# Patient Record
Sex: FEMALE | Race: WHITE | NOT HISPANIC OR LATINO | Employment: OTHER | URBAN - METROPOLITAN AREA
[De-identification: names, ages, dates, MRNs, and addresses within clinical notes are randomized per-mention and may not be internally consistent; named-entity substitution may affect disease eponyms.]

---

## 2017-05-19 ENCOUNTER — OFFICE VISIT (OUTPATIENT)
Dept: URGENT CARE | Facility: MEDICAL CENTER | Age: 32
End: 2017-05-19
Payer: COMMERCIAL

## 2017-05-19 PROCEDURE — 90715 TDAP VACCINE 7 YRS/> IM: CPT

## 2017-05-19 PROCEDURE — 99213 OFFICE O/P EST LOW 20 MIN: CPT

## 2017-05-19 PROCEDURE — S9088 SERVICES PROVIDED IN URGENT: HCPCS

## 2017-07-06 ENCOUNTER — APPOINTMENT (OUTPATIENT)
Dept: LAB | Facility: CLINIC | Age: 32
End: 2017-07-06
Payer: COMMERCIAL

## 2017-07-06 ENCOUNTER — ALLSCRIPTS OFFICE VISIT (OUTPATIENT)
Dept: OTHER | Facility: OTHER | Age: 32
End: 2017-07-06

## 2017-07-06 ENCOUNTER — TRANSCRIBE ORDERS (OUTPATIENT)
Dept: LAB | Facility: CLINIC | Age: 32
End: 2017-07-06

## 2017-07-06 DIAGNOSIS — R53.83 OTHER FATIGUE: ICD-10-CM

## 2017-07-06 DIAGNOSIS — R53.83 FATIGUE DUE TO DEPRESSION: Primary | ICD-10-CM

## 2017-07-06 DIAGNOSIS — F32.A FATIGUE DUE TO DEPRESSION: Primary | ICD-10-CM

## 2017-07-06 LAB
ALBUMIN SERPL BCP-MCNC: 4.2 G/DL (ref 3.5–5)
ALP SERPL-CCNC: 66 U/L (ref 46–116)
ALT SERPL W P-5'-P-CCNC: 19 U/L (ref 12–78)
ANION GAP SERPL CALCULATED.3IONS-SCNC: 5 MMOL/L (ref 4–13)
AST SERPL W P-5'-P-CCNC: 9 U/L (ref 5–45)
BASOPHILS # BLD AUTO: 0.02 THOUSANDS/ΜL (ref 0–0.1)
BASOPHILS NFR BLD AUTO: 0 % (ref 0–1)
BILIRUB SERPL-MCNC: 0.19 MG/DL (ref 0.2–1)
BUN SERPL-MCNC: 12 MG/DL (ref 5–25)
CALCIUM SERPL-MCNC: 9.2 MG/DL (ref 8.3–10.1)
CHLORIDE SERPL-SCNC: 104 MMOL/L (ref 100–108)
CO2 SERPL-SCNC: 26 MMOL/L (ref 21–32)
CREAT SERPL-MCNC: 0.74 MG/DL (ref 0.6–1.3)
EOSINOPHIL # BLD AUTO: 0.72 THOUSAND/ΜL (ref 0–0.61)
EOSINOPHIL NFR BLD AUTO: 8 % (ref 0–6)
ERYTHROCYTE [DISTWIDTH] IN BLOOD BY AUTOMATED COUNT: 13.2 % (ref 11.6–15.1)
FERRITIN SERPL-MCNC: 15 NG/ML (ref 8–388)
GFR SERPL CREATININE-BSD FRML MDRD: >60 ML/MIN/1.73SQ M
GLUCOSE P FAST SERPL-MCNC: 81 MG/DL (ref 65–99)
HCT VFR BLD AUTO: 45.4 % (ref 34.8–46.1)
HGB BLD-MCNC: 15.2 G/DL (ref 11.5–15.4)
IRON SATN MFR SERPL: 10 %
IRON SERPL-MCNC: 41 UG/DL (ref 50–170)
LYMPHOCYTES # BLD AUTO: 2.48 THOUSANDS/ΜL (ref 0.6–4.47)
LYMPHOCYTES NFR BLD AUTO: 28 % (ref 14–44)
MCH RBC QN AUTO: 30.3 PG (ref 26.8–34.3)
MCHC RBC AUTO-ENTMCNC: 33.5 G/DL (ref 31.4–37.4)
MCV RBC AUTO: 90 FL (ref 82–98)
MONOCYTES # BLD AUTO: 0.56 THOUSAND/ΜL (ref 0.17–1.22)
MONOCYTES NFR BLD AUTO: 6 % (ref 4–12)
NEUTROPHILS # BLD AUTO: 5.1 THOUSANDS/ΜL (ref 1.85–7.62)
NEUTS SEG NFR BLD AUTO: 58 % (ref 43–75)
NRBC BLD AUTO-RTO: 0 /100 WBCS
PLATELET # BLD AUTO: 304 THOUSANDS/UL (ref 149–390)
PMV BLD AUTO: 9.7 FL (ref 8.9–12.7)
POTASSIUM SERPL-SCNC: 3.7 MMOL/L (ref 3.5–5.3)
PROT SERPL-MCNC: 8.2 G/DL (ref 6.4–8.2)
RBC # BLD AUTO: 5.02 MILLION/UL (ref 3.81–5.12)
SODIUM SERPL-SCNC: 135 MMOL/L (ref 136–145)
T3 SERPL-MCNC: 0.8 NG/ML (ref 0.6–1.8)
T4 FREE SERPL-MCNC: 0.97 NG/DL (ref 0.76–1.46)
TIBC SERPL-MCNC: 405 UG/DL (ref 250–450)
TSH SERPL DL<=0.05 MIU/L-ACNC: 1.7 UIU/ML (ref 0.36–3.74)
VIT B12 SERPL-MCNC: 411 PG/ML (ref 100–900)
WBC # BLD AUTO: 8.9 THOUSAND/UL (ref 4.31–10.16)

## 2017-07-06 PROCEDURE — 85025 COMPLETE CBC W/AUTO DIFF WBC: CPT

## 2017-07-06 PROCEDURE — 86800 THYROGLOBULIN ANTIBODY: CPT

## 2017-07-06 PROCEDURE — 84443 ASSAY THYROID STIM HORMONE: CPT

## 2017-07-06 PROCEDURE — 86618 LYME DISEASE ANTIBODY: CPT

## 2017-07-06 PROCEDURE — 84439 ASSAY OF FREE THYROXINE: CPT

## 2017-07-06 PROCEDURE — 86038 ANTINUCLEAR ANTIBODIES: CPT

## 2017-07-06 PROCEDURE — 84482 T3 REVERSE: CPT

## 2017-07-06 PROCEDURE — 82728 ASSAY OF FERRITIN: CPT

## 2017-07-06 PROCEDURE — 82652 VIT D 1 25-DIHYDROXY: CPT

## 2017-07-06 PROCEDURE — 86430 RHEUMATOID FACTOR TEST QUAL: CPT

## 2017-07-06 PROCEDURE — 82607 VITAMIN B-12: CPT

## 2017-07-06 PROCEDURE — 36415 COLL VENOUS BLD VENIPUNCTURE: CPT

## 2017-07-06 PROCEDURE — 84480 ASSAY TRIIODOTHYRONINE (T3): CPT

## 2017-07-06 PROCEDURE — 83550 IRON BINDING TEST: CPT

## 2017-07-06 PROCEDURE — 83540 ASSAY OF IRON: CPT

## 2017-07-06 PROCEDURE — 80053 COMPREHEN METABOLIC PANEL: CPT

## 2017-07-06 PROCEDURE — 84432 ASSAY OF THYROGLOBULIN: CPT

## 2017-07-06 PROCEDURE — 86376 MICROSOMAL ANTIBODY EACH: CPT

## 2017-07-07 LAB
RHEUMATOID FACT SER QL LA: NEGATIVE
RYE IGE QN: NEGATIVE

## 2017-07-08 LAB
THYROGLOB AB SERPL-ACNC: <1 IU/ML (ref 0–0.9)
THYROGLOB SERPL-MCNC: 4.9 NG/ML (ref 1.5–38.5)
THYROPEROXIDASE AB SERPL-ACNC: 22 IU/ML (ref 0–34)

## 2017-07-09 LAB
B BURGDOR IGG SER IA-ACNC: 0.13
B BURGDOR IGM SER IA-ACNC: 0.24

## 2017-07-11 ENCOUNTER — GENERIC CONVERSION - ENCOUNTER (OUTPATIENT)
Dept: OTHER | Facility: OTHER | Age: 32
End: 2017-07-11

## 2017-07-11 LAB
1,25(OH)2D3 SERPL-MCNC: 30.1 PG/ML (ref 19.9–79.3)
T3REVERSE SERPL-MCNC: 15.4 NG/DL (ref 9.2–24.1)

## 2017-10-01 ENCOUNTER — OFFICE VISIT (OUTPATIENT)
Dept: URGENT CARE | Facility: MEDICAL CENTER | Age: 32
End: 2017-10-01
Payer: COMMERCIAL

## 2017-10-01 PROCEDURE — 99213 OFFICE O/P EST LOW 20 MIN: CPT

## 2017-10-01 PROCEDURE — S9088 SERVICES PROVIDED IN URGENT: HCPCS

## 2017-10-03 NOTE — PROGRESS NOTES
Assessment  1  Acute sinusitis (461 9) (J01 90)    Plan  Acute sinusitis    · Amoxicillin 500 MG Oral Capsule; TAKE 1 CAPSULE 3 TIMES DAILY UNTIL GONE   · Bromfed DM 30-2-10 MG/5ML Oral Syrup; TAKE 5 ML 4 times daily    tylenol for pain or fever  Chief Complaint  1  Cold Symptoms  Chief Complaint Free Text Note Form: Patient presents with cough and cold symptoms times one week, Now cough productive  History of Present Illness  HPI: 33 y/o F c/o 2 weeks of cough, congestion, sinus pressure  no meds otc for this  no fever  tolerating po  Hospital Based Practices Required Assessment:   Pain Assessment   the patient states they do not have pain  Active Problems  1  Antiphospholipid syndrome (289 81) (D68 61)   2  Fatigue (780 79) (R53 83)   3  PCOS (polycystic ovarian syndrome) (256 4) (E28 2)   4  Polycystic ovaries (256 4) (E28 2)   5  Puncture wound of right foot, initial encounter (892 0) (V11 956C)    Past Medical History  1  History of chicken pox (V12 09) (Z86 19)   2  History of multiple gestation (V49 89) (Z87 59)   3  History of pneumonia (V12 61) (Z87 01)   4  History of rupture of uterus (V13 29) (Z87 59)    Social History   · Alcohol use (V49 89) (Z78 9)   · Always uses seat belt   · Caffeine use (V49 89) (F15 90)   · Never a smoker   · Second hand tobacco smoke exposure (V15 89) (Z77 22)    Surgical History  1  History of Hysteroscopy   2  History of Knee Surgery    Current Meds   1  Jencycla 0 35 MG Oral Tablet; TAKE 1 TABLET DAILY; Therapy: (Recorded:[id number]) to Recorded  Medication List Reviewed: The medication list was reviewed and updated today  Allergies  1  Naproxen Sodium TABS   2  Valtrex  3  Animal dander - Cats   4   Animal dander - Dogs    Vitals  Signs   Recorded: [id number] 01:07PM   Temperature: 98 7 F, Oral  Heart Rate: 84  Respiration: 18  Systolic: 174  Diastolic: 70  O2 Saturation: 98  Pain Scale: 0    Physical Exam    Constitutional   General appearance: No acute distress, well appearing and well nourished  Eyes   Conjunctiva and lids: No swelling, erythema or discharge  Pupils and irises: Equal, round and reactive to light  Ears, Nose, Mouth, and Throat   External inspection of ears and nose: Abnormal  -TTP BL max sinuses  Otoscopic examination: Tympanic membranes translucent with normal light reflex  Canals patent without erythema  Nasal mucosa, septum, and turbinates: Normal without edema or erythema  Oropharynx: Abnormal  -PND  Pulmonary   Respiratory effort: No increased work of breathing or signs of respiratory distress  Auscultation of lungs: Clear to auscultation  Cardiovascular   Auscultation of heart: Normal rate and rhythm, normal S1 and S2, without murmurs  Lymphatic   Palpation of lymph nodes in neck: No lymphadenopathy         Signatures   Electronically signed by : Harrison Hollis, [facility]; Oct  1 2017  1:34PM EST                       (Author)    Electronically signed by : ELSI Burleson ; Oct  2 2017 11:05AM EST                       (Co-author)

## 2017-10-13 ENCOUNTER — HOSPITAL ENCOUNTER (EMERGENCY)
Facility: HOSPITAL | Age: 32
Discharge: HOME | End: 2017-10-13
Attending: EMERGENCY MEDICINE | Admitting: EMERGENCY MEDICINE
Payer: COMMERCIAL

## 2017-10-13 VITALS
HEIGHT: 60 IN | RESPIRATION RATE: 24 BRPM | OXYGEN SATURATION: 96 % | WEIGHT: 195.99 LBS | TEMPERATURE: 98.6 F | SYSTOLIC BLOOD PRESSURE: 124 MMHG | HEART RATE: 73 BPM | DIASTOLIC BLOOD PRESSURE: 80 MMHG | BODY MASS INDEX: 38.48 KG/M2

## 2017-10-13 DIAGNOSIS — J40 BRONCHITIS: Primary | ICD-10-CM

## 2017-10-13 LAB
ALBUMIN SERPL BCP-MCNC: 3.5 G/DL (ref 3.5–5)
ALP SERPL-CCNC: 65 U/L (ref 46–116)
ALT SERPL W P-5'-P-CCNC: 43 U/L (ref 12–78)
ANION GAP SERPL CALCULATED.3IONS-SCNC: 11 MMOL/L (ref 4–13)
AST SERPL W P-5'-P-CCNC: 13 U/L (ref 5–45)
BASOPHILS # BLD AUTO: 0.01 THOUSANDS/ΜL (ref 0–0.1)
BASOPHILS NFR BLD AUTO: 0 % (ref 0–1)
BILIRUB DIRECT SERPL-MCNC: 0.06 MG/DL (ref 0–0.2)
BILIRUB SERPL-MCNC: 0.2 MG/DL (ref 0.2–1)
BUN SERPL-MCNC: 5 MG/DL (ref 5–25)
CALCIUM SERPL-MCNC: 8.5 MG/DL (ref 8.3–10.1)
CHLORIDE SERPL-SCNC: 105 MMOL/L (ref 100–108)
CO2 SERPL-SCNC: 27 MMOL/L (ref 21–32)
CREAT SERPL-MCNC: 0.83 MG/DL (ref 0.6–1.3)
EOSINOPHIL # BLD AUTO: 0.01 THOUSAND/ΜL (ref 0–0.61)
EOSINOPHIL NFR BLD AUTO: 0 % (ref 0–6)
ERYTHROCYTE [DISTWIDTH] IN BLOOD BY AUTOMATED COUNT: 12.2 % (ref 11.6–15.1)
GFR SERPL CREATININE-BSD FRML MDRD: 93 ML/MIN/1.73SQ M
GLUCOSE SERPL-MCNC: 103 MG/DL (ref 65–140)
HCG SERPL QL: NEGATIVE
HCT VFR BLD AUTO: 38.7 % (ref 34.8–46.1)
HGB BLD-MCNC: 13.2 G/DL (ref 11.5–15.4)
LYMPHOCYTES # BLD AUTO: 1.17 THOUSANDS/ΜL (ref 0.6–4.47)
LYMPHOCYTES NFR BLD AUTO: 13 % (ref 14–44)
MCH RBC QN AUTO: 30.1 PG (ref 26.8–34.3)
MCHC RBC AUTO-ENTMCNC: 34.1 G/DL (ref 31.4–37.4)
MCV RBC AUTO: 88 FL (ref 82–98)
MONOCYTES # BLD AUTO: 0.42 THOUSAND/ΜL (ref 0.17–1.22)
MONOCYTES NFR BLD AUTO: 5 % (ref 4–12)
NEUTROPHILS # BLD AUTO: 7.35 THOUSANDS/ΜL (ref 1.85–7.62)
NEUTS SEG NFR BLD AUTO: 82 % (ref 43–75)
NRBC BLD AUTO-RTO: 0 /100 WBCS
PLATELET # BLD AUTO: 302 THOUSANDS/UL (ref 149–390)
PMV BLD AUTO: 8.7 FL (ref 8.9–12.7)
POTASSIUM SERPL-SCNC: 3.6 MMOL/L (ref 3.5–5.3)
PROT SERPL-MCNC: 7.6 G/DL (ref 6.4–8.2)
RBC # BLD AUTO: 4.39 MILLION/UL (ref 3.81–5.12)
SODIUM SERPL-SCNC: 143 MMOL/L (ref 136–145)
WBC # BLD AUTO: 9 THOUSAND/UL (ref 4.31–10.16)

## 2017-10-13 PROCEDURE — 99285 EMERGENCY DEPT VISIT HI MDM: CPT

## 2017-10-13 PROCEDURE — 85025 COMPLETE CBC W/AUTO DIFF WBC: CPT | Performed by: EMERGENCY MEDICINE

## 2017-10-13 PROCEDURE — 96361 HYDRATE IV INFUSION ADD-ON: CPT

## 2017-10-13 PROCEDURE — 36415 COLL VENOUS BLD VENIPUNCTURE: CPT | Performed by: EMERGENCY MEDICINE

## 2017-10-13 PROCEDURE — 96374 THER/PROPH/DIAG INJ IV PUSH: CPT

## 2017-10-13 PROCEDURE — 80048 BASIC METABOLIC PNL TOTAL CA: CPT | Performed by: EMERGENCY MEDICINE

## 2017-10-13 PROCEDURE — 84703 CHORIONIC GONADOTROPIN ASSAY: CPT | Performed by: EMERGENCY MEDICINE

## 2017-10-13 PROCEDURE — 80076 HEPATIC FUNCTION PANEL: CPT | Performed by: EMERGENCY MEDICINE

## 2017-10-13 RX ORDER — PREDNISONE 20 MG/1
40 TABLET ORAL DAILY
Qty: 10 TABLET | Refills: 0 | Status: SHIPPED | OUTPATIENT
Start: 2017-10-13 | End: 2017-10-18

## 2017-10-13 RX ORDER — METHYLPREDNISOLONE SODIUM SUCCINATE 125 MG/2ML
125 INJECTION, POWDER, LYOPHILIZED, FOR SOLUTION INTRAMUSCULAR; INTRAVENOUS ONCE
Status: COMPLETED | OUTPATIENT
Start: 2017-10-13 | End: 2017-10-13

## 2017-10-13 RX ORDER — ALBUTEROL SULFATE 2.5 MG/3ML
5 SOLUTION RESPIRATORY (INHALATION) ONCE
Status: COMPLETED | OUTPATIENT
Start: 2017-10-13 | End: 2017-10-13

## 2017-10-13 RX ORDER — ALBUTEROL SULFATE 2.5 MG/3ML
2.5 SOLUTION RESPIRATORY (INHALATION) EVERY 6 HOURS PRN
Qty: 75 ML | Refills: 0 | Status: SHIPPED | OUTPATIENT
Start: 2017-10-13 | End: 2019-03-20

## 2017-10-13 RX ADMIN — METHYLPREDNISOLONE SODIUM SUCCINATE 125 MG: 125 INJECTION, POWDER, FOR SOLUTION INTRAMUSCULAR; INTRAVENOUS at 19:21

## 2017-10-13 RX ADMIN — SODIUM CHLORIDE 1000 ML: 0.9 INJECTION, SOLUTION INTRAVENOUS at 19:17

## 2017-10-13 RX ADMIN — HYDROCODONE BITARTRATE AND HOMATROPINE METHYLBROMIDE 5 ML: 5; 1.5 SOLUTION ORAL at 19:24

## 2017-10-13 RX ADMIN — ALBUTEROL SULFATE 5 MG: 2.5 SOLUTION RESPIRATORY (INHALATION) at 19:24

## 2017-10-13 RX ADMIN — IPRATROPIUM BROMIDE 0.5 MG: 0.5 SOLUTION RESPIRATORY (INHALATION) at 19:24

## 2017-10-13 NOTE — ED PROVIDER NOTES
History  Chief Complaint   Patient presents with    Shortness of Breath     pt stated to have had cough for the passed three weeks that has been constant  pt is stating to "lose voice" and have a sore throat  Pt stated to have been treated with antibiotics/cough medicine and has had no benefit  HPI patient is a 50-year-old female, history of cough for the last 3 weeks treated with antibiotics, patient reports using albuterol at home but still has a hacking cough  Patient request cough suppressant and she reports that she cannot sleep  Patient reports not sleeping more than 2 hours over the last few days  Patient reports she believe she is adequately treated with antibiotics but reports that she has recurrent bronchitis and feels that this is a bronchitis but reports that she cannot stop coughing  Patient reports was treated with prednisone for 3 days but it improved but not resolved and it recurred  She denies any fever or chills  She denies any sputum production  Past medical history of chronic bronchitis  Family history noncontributory  Social history nonsmoker no history of drug abuse  Prior to Admission Medications   Prescriptions Last Dose Informant Patient Reported? Taking? 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[address] )        Past Surgical History:   Procedure Laterality Date     SECTION         History reviewed  No pertinent family history  I have reviewed and agree with the history as documented  Social History   Substance Use Topics    Smoking status: Passive Smoke Exposure - Never Smoker    Smokeless tobacco: Never Used    Alcohol use Yes      Comment: occasional        Review of Systems   Constitutional: Negative for diaphoresis, fatigue and fever  HENT: Negative for congestion, ear pain, nosebleeds and sore throat  Eyes: Negative for photophobia, pain, discharge and visual disturbance  Respiratory: Positive for cough  Negative for choking, chest tightness, shortness of breath and wheezing  Cardiovascular: Negative for chest pain and palpitations  Gastrointestinal: Negative for abdominal distention, abdominal pain, diarrhea and vomiting  Genitourinary: Negative for dysuria, flank pain and frequency  Musculoskeletal: Negative for back pain, gait problem and joint swelling  Skin: Negative for color change and rash  Neurological: Negative for dizziness, syncope and headaches  Psychiatric/Behavioral: Negative for behavioral problems and confusion  The patient is not nervous/anxious  All other systems reviewed and are negative  Physical Exam  ED Triage Vitals [10/13/17 1854]   Temperature Pulse Respirations Blood Pressure SpO2   98 6 °F (37 °C) 73 (!) 24 124/80 96 %      Temp Source Heart Rate Source Patient Position - Orthostatic VS BP Location FiO2 (%)   Oral -- Sitting Right arm --      Pain Score       --           Physical Exam   Constitutional: She is oriented to person, place, and time  She appears well-developed and well-nourished  HENT:   Head: Normocephalic  Right Ear: External ear normal    Left Ear: External ear normal    Nose: Nose normal    Mouth/Throat: Oropharynx is clear and moist    Eyes: EOM and lids are normal  Pupils are equal, round, and reactive to light  Neck: Normal range of motion  Neck supple  Cardiovascular: Normal rate, regular rhythm, normal heart sounds and intact distal pulses  Pulmonary/Chest: Effort normal and breath sounds normal  No respiratory distress  The patient has cough with deep inspiration   Abdominal: Soft  Bowel sounds are normal  She exhibits no distension and no mass  There is no tenderness  There is no rebound and no guarding  No hernia  Musculoskeletal: Normal range of motion  She exhibits no deformity     Neurological: She is alert and oriented to person, place, and time  Skin: Skin is warm and dry  Psychiatric: She has a normal mood and affect  Nursing note and vitals reviewed     Pulse oximetry 96% on room air adequate oxygenation, hypoxia    ED Medications  Medications   sodium chloride 0 9 % bolus 1,000 mL (0 mL Intravenous Stopped 10/13/17 2037)   methylPREDNISolone sodium succinate (Solu-MEDROL) injection 125 mg (125 mg Intravenous Given 10/13/17 1921)   albuterol inhalation solution 5 mg (5 mg Nebulization Given 10/13/17 1924)   ipratropium (ATROVENT) 0 02 % inhalation solution 0 5 mg (0 5 mg Nebulization Given 10/13/17 1924)   HYDROcodone-homatropine (HYCODAN) 5-1 5 mg/5 mL oral syrup 5 mL (5 mL Oral Given 10/13/17 1924)       Diagnostic Studies  Labs Reviewed   CBC AND DIFFERENTIAL - Abnormal        Result Value Ref Range Status    MPV 8 7 (*) 8 9 - 12 7 fL Final    Neutrophils Relative 82 (*) 43 - 75 % Final    Lymphocytes Relative 13 (*) 14 - 44 % Final    WBC 9 00  4 31 - 10 16 Thousand/uL Final    RBC 4 39  3 81 - 5 12 Million/uL Final    Hemoglobin 13 2  11 5 - 15 4 g/dL Final    Hematocrit 38 7  34 8 - 46 1 % Final    MCV 88  82 - 98 fL Final    MCH 30 1  26 8 - 34 3 pg Final    MCHC 34 1  31 4 - 37 4 g/dL Final    RDW 12 2  11 6 - 15 1 % Final    Platelets 532  777 - 390 Thousands/uL Final    nRBC 0  /100 WBCs Final    Monocytes Relative 5  4 - 12 % Final    Eosinophils Relative 0  0 - 6 % Final    Basophils Relative 0  0 - 1 % Final    Neutrophils Absolute 7 35  1 85 - 7 62 Thousands/µL Final    Lymphocytes Absolute 1 17  0 60 - 4 47 Thousands/µL Final    Monocytes Absolute 0 42  0 17 - 1 22 Thousand/µL Final    Eosinophils Absolute 0 01  0 00 - 0 61 Thousand/µL Final    Basophils Absolute 0 01  0 00 - 0 10 Thousands/µL Final   HEPATIC FUNCTION PANEL - Normal    Total Bilirubin 0 20  0 20 - 1 00 mg/dL Final    Bilirubin, Direct 0 06  0 00 - 0 20 mg/dL Final    Alkaline Phosphatase 65  46 - 116 U/L Final    AST 13  5 - 45 U/L Final    Comment:   Specimen collection should occur prior to Sulfasalazine administration due to the potential for falsely depressed results  ALT 43  12 - 78 U/L Final    Comment:   Specimen collection should occur prior to Sulfasalazine administration due to the potential for falsely depressed results  Total Protein 7 6  6 4 - 8 2 g/dL Final    Albumin 3 5  3 5 - 5 0 g/dL Final   PREGNANCY TEST (HCG QUALITATIVE) - Normal    Preg, Serum Negative  Negative Final   BASIC METABOLIC PANEL    Sodium 399  136 - 145 mmol/L Final    Potassium 3 6  3 5 - 5 3 mmol/L Final    Chloride 105  100 - 108 mmol/L Final    CO2 27  21 - 32 mmol/L Final    Anion Gap 11  4 - 13 mmol/L Final    BUN 5  5 - 25 mg/dL Final    Creatinine 0 83  0 60 - 1 30 mg/dL Final    Comment: Standardized to IDMS reference method    Glucose 103  65 - 140 mg/dL Final    Comment:   If the patient is fasting, the ADA then defines impaired fasting glucose as > 100 mg/dL and diabetes as > or equal to 123 mg/dL  Specimen collection should occur prior to Sulfasalazine administration due to the potential for falsely depressed results  Specimen collection should occur prior to Sulfapyridine administration due to the potential for falsely elevated results  Calcium 8 5  8 3 - 10 1 mg/dL Final    eGFR 93  ml/min/1 73sq m Final    Narrative:     National Kidney Disease Education Program recommendations are as follows:  GFR calculation is accurate only with a steady state creatinine  Chronic Kidney disease less than 60 ml/min/1 73 sq  meters  Kidney failure less than 15 ml/min/1 73 sq  meters  No orders to display       Procedures  Procedures      Phone Contacts  ED Phone Contact    ED Course  ED Course         diagnostic testing shows a white count 9000, which is normal no sign of inflammation, hemoglobin was normal at 13 point do not inside of anemia  Patient had normal liver functions     patient's electrolytes were normal  Discussed with patient did not want a chest x-ray had a chest x-ray this week that her doctor told her was normal   Patient did not want to be re-treated with antibiotics was treated for antibiotics 10 days and believes that sent off  Patient most concerned about her cough  Patient was markedly improved after albuterol prednisone and Hycodan                      [facility] medical decision making 18-year-old female with hacking cough, history of asthma, treated with bronchodilators and steroids, requesting primarily cough medicine here, treated with Hycodan with relief, we discussed treatment and follow-up, we discussed indications to return  CritCare Time    Disposition  Final diagnoses:   Bronchitis     ED Disposition     ED Disposition Condition Comment    Discharge  Yong Milian discharge to home/self care  Condition at discharge: Good        Follow-up Information    None       Discharge Medication List as of 10/13/2017  8:21 PM      START taking these medications    Details   albuterol (2 5 mg/3 mL) 0 083 % nebulizer solution Take 3 mL by nebulization every 6 (six) hours as needed for wheezing, Starting Fri 10/13/2017, Print      HYDROcodone-homatropine (HYCODAN) 5-1 5 mg/5 mL syrup Take 5 mL by mouth 4 (four) times a day as needed for cough for up to 10 days Max Daily Amount: 20 mL, Starting Fri 10/13/2017, Until Mon 10/23/2017, Print      predniSONE 20 mg tablet Take 2 tablets by mouth daily for 5 days, Starting Fri 10/13/2017, Until Wed 10/18/2017, Print         CONTINUE these medications which have NOT CHANGED    Details   Norethindrone (JENCYCLA PO) Take by mouth, Historical Med           No discharge procedures on file      ED Provider  Electronically Signed by       Constantin Silva MD  10/13/17 6470

## 2017-10-14 NOTE — DISCHARGE INSTRUCTIONS
Albuterol every 6 hours as needed for bronchospasm  Prednisone 2 tabs daily for the next 5 days to reduce inflammation  Hycodan every 4-6 hours if needed for cough, caution will make a sleepy narcotic  Follow up with your doctor     Acute Bronchitis   WHAT YOU NEED TO KNOW:   Acute bronchitis is swelling and irritation in the air passages of your lungs  This irritation may cause you to cough or have other breathing problems  Acute bronchitis often starts because of another illness, such as a cold or the flu  The illness spreads from your nose and throat to your windpipe and airways  Bronchitis is often called a chest cold  Acute bronchitis lasts about 3 to 6 weeks and is usually not a serious illness  Your cough can last for several weeks  DISCHARGE INSTRUCTIONS:   Return to the emergency department if:   · You cough up blood  · Your lips or fingernails turn blue  · You feel like you are not getting enough air when you breathe  Contact your healthcare provider if:   · You have a fever  · Your breathing problems do not go away or get worse  · Your cough does not get better within 4 weeks  · You have questions or concerns about your condition or care  Self-care:   · Get more rest   Rest helps your body to heal  Slowly start to do more each day  Rest when you feel it is needed  · Avoid irritants in the air  Avoid chemicals, fumes, and dust  Wear a face mask if you must work around dust or fumes  Stay inside on days when air pollution levels are high  If you have allergies, stay inside when pollen counts are high  Do not use aerosol products, such as spray-on deodorant, bug spray, and hair spray  · Do not smoke or be around others who smoke  Nicotine and other chemicals in cigarettes and cigars damages the cilia that move mucus out of your lungs  Ask your healthcare provider for information if you currently smoke and need help to quit  E-cigarettes or smokeless tobacco still contain nicotine  Talk to your healthcare provider before you use these products  · Drink liquids as directed  Liquids help keep your air passages moist and help you cough up mucus  You may need to drink more liquids when you have acute bronchitis  Ask how much liquid to drink each day and which liquids are best for you  · Use a humidifier or vaporizer  Use a cool mist humidifier or a vaporizer to increase air moisture in your home  This may make it easier for you to breathe and help decrease your cough  Decrease risk for acute bronchitis:   · Get the vaccinations you need  Ask your healthcare provider if you should get vaccinated against the flu or pneumonia  · Prevent the spread of germs  You can decrease your risk of acute bronchitis and other illnesses by doing the following:     [facility] [place] hands often with soap and water  Carry germ-killing hand lotion or gel with you  You can use the lotion or gel to clean your hands when soap and water are not available  ¨ Do not touch your eyes, nose, or mouth unless you have washed your hands first     ¨ Always cover your mouth when you cough to prevent the spread of germs  It is best to cough into a tissue or your shirt sleeve instead of into your hand  Ask those around you cover their mouths when they cough  ¨ Try to avoid people who have a cold or the flu  If you are sick, stay away from others as much as possible  Medicines: Your healthcare provider may  give you any of the following:  · Ibuprofen or acetaminophen  are medicines that help lower your fever  They are available without a doctor's order  Ask your healthcare provider which medicine is right for you  Ask how much to take and how often to take it  Follow directions  These medicines can cause stomach bleeding if not taken correctly  Ibuprofen can cause kidney damage  Do not take ibuprofen if you have kidney disease, an ulcer, or allergies to aspirin  Acetaminophen can cause liver damage   Do not take more than 4,000 milligrams in 24 hours  · Decongestants  help loosen mucus in your lungs and make it easier to cough up  This can help you breathe easier  · Cough suppressants  decrease your urge to cough  If your cough produces mucus, do not take a cough suppressant unless your healthcare provider tells you to  Your healthcare provider may suggest that you take a cough suppressant at night so you can rest     · Inhalers  may be given  Your healthcare provider may give you one or more inhalers to help you breathe easier and cough less  An inhaler gives your medicine to open your airways  Ask your healthcare provider to show you how to use your inhaler correctly  · Take your medicine as directed  Contact your healthcare provider if you think your medicine is not helping or if you have side effects  Tell him of her if you are allergic to any medicine  Keep a list of the medicines, vitamins, and herbs you take  Include the amounts, and when and why you take them  Bring the list or the pill bottles to follow-up visits  Carry your medicine list with you in case of an emergency  Follow up with your healthcare provider as directed:  Write down questions you have so you will remember to ask them during your follow-up visits  © 2017 [employer] [employer]  Information is for End User's use only and may not be sold, redistributed or otherwise used for commercial purposes  All illustrations and images included in CareNotes® are the copyrighted property of [employer]  or [address]  The above information is an  only  It is not intended as medical advice for individual conditions or treatments  Talk to your doctor, nurse or pharmacist before following any medical regimen to see if it is safe and effective for you

## 2017-10-16 ENCOUNTER — APPOINTMENT (EMERGENCY)
Dept: CT IMAGING | Facility: HOSPITAL | Age: 32
End: 2017-10-16
Payer: COMMERCIAL

## 2017-10-16 ENCOUNTER — HOSPITAL ENCOUNTER (EMERGENCY)
Facility: HOSPITAL | Age: 32
Discharge: HOME | End: 2017-10-16
Admitting: EMERGENCY MEDICINE
Payer: COMMERCIAL

## 2017-10-16 VITALS
RESPIRATION RATE: 20 BRPM | BODY MASS INDEX: 38.49 KG/M2 | OXYGEN SATURATION: 94 % | DIASTOLIC BLOOD PRESSURE: 78 MMHG | SYSTOLIC BLOOD PRESSURE: 128 MMHG | TEMPERATURE: 98.6 F | HEART RATE: 91 BPM | WEIGHT: 197.09 LBS

## 2017-10-16 DIAGNOSIS — J20.9 ACUTE BRONCHITIS, UNSPECIFIED ORGANISM: ICD-10-CM

## 2017-10-16 DIAGNOSIS — J18.9 COMMUNITY ACQUIRED PNEUMONIA, UNSPECIFIED LATERALITY: Primary | ICD-10-CM

## 2017-10-16 LAB
ALBUMIN SERPL BCP-MCNC: 3.5 G/DL (ref 3.5–5)
ALP SERPL-CCNC: 61 U/L (ref 46–116)
ALT SERPL W P-5'-P-CCNC: 31 U/L (ref 12–78)
ANION GAP SERPL CALCULATED.3IONS-SCNC: 10 MMOL/L (ref 4–13)
AST SERPL W P-5'-P-CCNC: 27 U/L (ref 5–45)
BASOPHILS # BLD AUTO: 0.01 THOUSANDS/ΜL (ref 0–0.1)
BASOPHILS NFR BLD AUTO: 0 % (ref 0–1)
BILIRUB SERPL-MCNC: 0.4 MG/DL (ref 0.2–1)
BUN SERPL-MCNC: 8 MG/DL (ref 5–25)
CALCIUM SERPL-MCNC: 9.1 MG/DL (ref 8.3–10.1)
CHLORIDE SERPL-SCNC: 102 MMOL/L (ref 100–108)
CO2 SERPL-SCNC: 28 MMOL/L (ref 21–32)
CREAT SERPL-MCNC: 0.86 MG/DL (ref 0.6–1.3)
EOSINOPHIL # BLD AUTO: 0.12 THOUSAND/ΜL (ref 0–0.61)
EOSINOPHIL NFR BLD AUTO: 1 % (ref 0–6)
ERYTHROCYTE [DISTWIDTH] IN BLOOD BY AUTOMATED COUNT: 12.6 % (ref 11.6–15.1)
GFR SERPL CREATININE-BSD FRML MDRD: 89 ML/MIN/1.73SQ M
GLUCOSE SERPL-MCNC: 93 MG/DL (ref 65–140)
HCT VFR BLD AUTO: 37.8 % (ref 34.8–46.1)
HGB BLD-MCNC: 12.7 G/DL (ref 11.5–15.4)
HOLD SPECIMEN: NORMAL
LACTATE SERPL-SCNC: 1.5 MMOL/L (ref 0.5–2)
LYMPHOCYTES # BLD AUTO: 1.47 THOUSANDS/ΜL (ref 0.6–4.47)
LYMPHOCYTES NFR BLD AUTO: 17 % (ref 14–44)
MCH RBC QN AUTO: 29.6 PG (ref 26.8–34.3)
MCHC RBC AUTO-ENTMCNC: 33.6 G/DL (ref 31.4–37.4)
MCV RBC AUTO: 88 FL (ref 82–98)
MONOCYTES # BLD AUTO: 0.5 THOUSAND/ΜL (ref 0.17–1.22)
MONOCYTES NFR BLD AUTO: 6 % (ref 4–12)
NEUTROPHILS # BLD AUTO: 6.35 THOUSANDS/ΜL (ref 1.85–7.62)
NEUTS SEG NFR BLD AUTO: 75 % (ref 43–75)
NRBC BLD AUTO-RTO: 0 /100 WBCS
PLATELET # BLD AUTO: 177 THOUSANDS/UL (ref 149–390)
PMV BLD AUTO: 10.6 FL (ref 8.9–12.7)
POTASSIUM SERPL-SCNC: 3.3 MMOL/L (ref 3.5–5.3)
PROT SERPL-MCNC: 7.6 G/DL (ref 6.4–8.2)
RBC # BLD AUTO: 4.29 MILLION/UL (ref 3.81–5.12)
SODIUM SERPL-SCNC: 140 MMOL/L (ref 136–145)
WBC # BLD AUTO: 8.49 THOUSAND/UL (ref 4.31–10.16)

## 2017-10-16 PROCEDURE — 71275 CT ANGIOGRAPHY CHEST: CPT

## 2017-10-16 PROCEDURE — 36415 COLL VENOUS BLD VENIPUNCTURE: CPT | Performed by: NURSE PRACTITIONER

## 2017-10-16 PROCEDURE — 94640 AIRWAY INHALATION TREATMENT: CPT

## 2017-10-16 PROCEDURE — 83605 ASSAY OF LACTIC ACID: CPT | Performed by: NURSE PRACTITIONER

## 2017-10-16 PROCEDURE — 96375 TX/PRO/DX INJ NEW DRUG ADDON: CPT

## 2017-10-16 PROCEDURE — 96361 HYDRATE IV INFUSION ADD-ON: CPT

## 2017-10-16 PROCEDURE — 85025 COMPLETE CBC W/AUTO DIFF WBC: CPT | Performed by: NURSE PRACTITIONER

## 2017-10-16 PROCEDURE — 96374 THER/PROPH/DIAG INJ IV PUSH: CPT

## 2017-10-16 PROCEDURE — 80053 COMPREHEN METABOLIC PANEL: CPT | Performed by: NURSE PRACTITIONER

## 2017-10-16 PROCEDURE — 99285 EMERGENCY DEPT VISIT HI MDM: CPT

## 2017-10-16 RX ORDER — IPRATROPIUM BROMIDE AND ALBUTEROL SULFATE 2.5; .5 MG/3ML; MG/3ML
SOLUTION RESPIRATORY (INHALATION)
Status: COMPLETED
Start: 2017-10-16 | End: 2017-10-16

## 2017-10-16 RX ORDER — LORAZEPAM 2 MG/ML
1 INJECTION INTRAMUSCULAR ONCE
Status: COMPLETED | OUTPATIENT
Start: 2017-10-16 | End: 2017-10-16

## 2017-10-16 RX ORDER — GUAIFENESIN 600 MG
600 TABLET, EXTENDED RELEASE 12 HR ORAL EVERY 12 HOURS SCHEDULED
Qty: 10 TABLET | Refills: 0 | Status: SHIPPED | OUTPATIENT
Start: 2017-10-16 | End: 2017-10-21

## 2017-10-16 RX ORDER — DOXYCYCLINE HYCLATE 100 MG/1
100 CAPSULE ORAL 2 TIMES DAILY
Qty: 20 CAPSULE | Refills: 0 | Status: SHIPPED | OUTPATIENT
Start: 2017-10-16 | End: 2017-10-26

## 2017-10-16 RX ORDER — ALBUTEROL SULFATE 2.5 MG/3ML
2.5 SOLUTION RESPIRATORY (INHALATION) EVERY 6 HOURS PRN
Qty: 75 ML | Refills: 0 | Status: SHIPPED | OUTPATIENT
Start: 2017-10-16 | End: 2017-10-23

## 2017-10-16 RX ORDER — DOXYCYCLINE HYCLATE 100 MG/1
100 CAPSULE ORAL ONCE
Status: COMPLETED | OUTPATIENT
Start: 2017-10-16 | End: 2017-10-16

## 2017-10-16 RX ORDER — METHYLPREDNISOLONE SODIUM SUCCINATE 125 MG/2ML
125 INJECTION, POWDER, LYOPHILIZED, FOR SOLUTION INTRAMUSCULAR; INTRAVENOUS ONCE
Status: COMPLETED | OUTPATIENT
Start: 2017-10-16 | End: 2017-10-16

## 2017-10-16 RX ORDER — ALBUTEROL SULFATE 2.5 MG/3ML
5 SOLUTION RESPIRATORY (INHALATION) ONCE
Status: COMPLETED | OUTPATIENT
Start: 2017-10-16 | End: 2017-10-16

## 2017-10-16 RX ORDER — PREDNISONE 20 MG/1
60 TABLET ORAL DAILY
Qty: 15 TABLET | Refills: 0 | Status: SHIPPED | OUTPATIENT
Start: 2017-10-16 | End: 2017-10-21

## 2017-10-16 RX ORDER — SODIUM CHLORIDE FOR INHALATION 0.9 %
VIAL, NEBULIZER (ML) INHALATION
Status: COMPLETED
Start: 2017-10-16 | End: 2017-10-16

## 2017-10-16 RX ADMIN — IPRATROPIUM BROMIDE 0.5 MG: 0.5 SOLUTION RESPIRATORY (INHALATION) at 14:42

## 2017-10-16 RX ADMIN — ISODIUM CHLORIDE: 0.03 SOLUTION RESPIRATORY (INHALATION) at 13:21

## 2017-10-16 RX ADMIN — DOXYCYCLINE HYCLATE 100 MG: 100 CAPSULE, GELATIN COATED ORAL at 14:42

## 2017-10-16 RX ADMIN — LORAZEPAM: 2 INJECTION INTRAMUSCULAR; INTRAVENOUS at 13:16

## 2017-10-16 RX ADMIN — METHYLPREDNISOLONE SODIUM SUCCINATE 125 MG: 125 INJECTION, POWDER, FOR SOLUTION INTRAMUSCULAR; INTRAVENOUS at 13:14

## 2017-10-16 RX ADMIN — ALBUTEROL SULFATE 2.5 MG: 2.5 SOLUTION RESPIRATORY (INHALATION) at 14:41

## 2017-10-16 RX ADMIN — IOHEXOL 85 ML: 350 INJECTION, SOLUTION INTRAVENOUS at 13:08

## 2017-10-16 RX ADMIN — SODIUM CHLORIDE 1000 ML: 0.9 INJECTION, SOLUTION INTRAVENOUS at 12:57

## 2017-10-16 RX ADMIN — IPRATROPIUM BROMIDE AND ALBUTEROL SULFATE 3 ML: .5; 3 SOLUTION RESPIRATORY (INHALATION) at 13:21

## 2017-10-16 NOTE — DISCHARGE INSTRUCTIONS
Acute Bronchitis   WHAT YOU SHOULD KNOW:   Acute bronchitis is swelling and irritation in the air passages of your lungs  This irritation may cause you to cough or have other breathing problems  Acute bronchitis often starts because of another viral illness, such as a cold or the flu  The illness spreads from your nose and throat to your windpipe and airways  Bronchitis is often called a chest cold  Acute bronchitis lasts about 2 weeks and is usually not a serious illness  AFTER YOU LEAVE:   Medicines:   · Ibuprofen or acetaminophen:  These medicines help lower a fever  They are available without a doctor's order  Ask your healthcare provider which medicine is right for you  Ask how much to take and how often to take it  Follow directions  These medicines can cause stomach bleeding if not taken correctly  Ibuprofen can cause kidney damage  Do not take ibuprofen if you have kidney disease, an ulcer, or allergies to aspirin  Acetaminophen can cause liver damage  Do not drink alcohol if you take acetaminophen  · Cough medicine: This medicine helps loosen mucus in your lungs and make it easier to cough up  This can help you breathe easier  · Inhalers: You may need one or more inhalers to help you breathe easier and cough less  An inhaler gives your medicine in a mist form so that you can breathe it into your lungs  Ask your healthcare provider to show you how to use your inhaler correctly  · Steroid medicine:  Steroid medicine helps open your air passages so you can breathe easier  · Take your medicine as directed  Call your healthcare provider if you think your medicine is not helping or if you have side effects  Tell him if you are allergic to any medicine  Keep a list of the medicines, vitamins, and herbs you take  Include the amounts, and when and why you take them  Bring the list or the pill bottles to follow-up visits  Carry your medicine list with you in case of an emergency    How to use an inhaler:   · Shake the inhaler well to make sure you get the correct amount of medicine per puff  Remove the cover from your inhaler's mouthpiece  If you are using a spacer, connect your inhaler to the flat end of the spacer  · Exhale as much air from your lungs as you can  Put the mouthpiece in your mouth past your front teeth and rest it on the top of your tongue  Do not block the mouthpiece opening with your tongue  · Breathe in through your mouth at a slow and steady rate  As you do this, press the inhaler to release the puff of medicine  Finish breathing in slowly and deeply as you inhale the medicine  When your lungs are full, hold your breath for 10 seconds  Then breathe out slowly through puckered lips or through your nose  · If you need to take more puffs, wait at least 1 minute between each puff  · Rinse your mouth with water after you use the inhaler  This may keep you from getting a mouth infection or irritation  · Follow the instructions that come with your inhaler to clean it  You should clean your inhaler at least once a week  Ways to care for yourself:   · Avoid alcohol:  Alcohol dulls your urge to cough and sneeze  When you have bronchitis, you need to be able to cough and sneeze to clear your air passages  Alcohol also causes your body to lose fluid  This can make the mucus in your lungs thicker and harder to cough up  · Avoid irritants in the air:  Do not smoke or allow others to smoke around you  Avoid chemicals, fumes, and dust  Wear a face mask if you must work around dust or fumes  Stay inside on days when air pollution levels are high  If you have allergies, stay inside when pollen counts are high  Avoid aerosol products  This includes spray-on deodorant, bug spray, and hair spray  · Drink more liquids:  Most people should drink at least 8 eight-ounce cups of water a day  You may need to drink more liquids when you have acute bronchitis   Liquids help keep your air passages moist and help you cough up mucus  · Get more rest:  You may feel like resting more  Slowly start to do more each day  Rest when you feel it is needed  · Eat healthy foods:  Eat a variety healthy foods every day  Your diet should include fruits, vegetables, breads, and protein (such as chicken, fish, and beans)  Dairy products (such as milk, cheese, and ice cream) can sometimes increase the amount of mucus your body makes  Ask if you should decrease your intake of dairy products  · Use a humidifier:  Use a cool mist humidifier to increase air moisture in your home  This may make it easier for you to breathe and help decrease your cough  Decrease your risk of acute bronchitis:   · Get the vaccinations you need:  Ask your healthcare provider if you should get vaccinated against the flu or pneumonia  · Avoid things that may irritate your lungs:  Stay inside or cover your mouth and nose with a scarf when you are outside during cold weather  You should also stay inside on days when air pollution levels are high  If you have allergies, stay inside when pollen counts are high  Avoid using aerosol products in your home  This includes spray-on deodorant, bug spray, and hair spray  · Avoid the spread of germs:        [facility] your hands often with soap and water  Carry germ-killing gel with you  You can use the gel to clean your hands when there is no soap and water available  ¨ Do not touch your eyes, nose, or mouth unless you have washed your hands first     ¨ Always cover your mouth when you cough  Cough into a tissue or your shirtsleeve so you do not spread germs from your hands  ¨ Try to avoid people who have a cold or the flu  If you are sick, stay away from others as much as possible  Follow up with your healthcare provider as directed:  Write down questions you have so you will remember to ask them during your follow-up visits    Contact your healthcare provider if:   · You have a fever     · Your skin becomes itchy or you have a rash after you take your medicine  · Your breathing problems do not go away or get worse  · Your cough does not get better with treatment  · You cough up blood  · You have questions or concerns about your condition or care  Seek care immediately or call 911 if:   · You faint  · Your lips or fingernails turn blue  · You feel like you are not getting enough air when you breathe  · You have swelling of your lips, tongue, or throat that makes it hard to breathe or swallow  © 2014 [address] [employer] is for End User's use only and may not be sold, redistributed or otherwise used for commercial purposes  All illustrations and images included in CareNotes® are the copyrighted property of A D A M , Inc  or Taran [employer]cher  The above information is an  only  It is not intended as medical advice for individual conditions or treatments  Talk to your doctor, nurse or pharmacist before following any medical regimen to see if it is safe and effective for you  Pneumonia   WHAT YOU NEED TO KNOW:   Pneumonia is an infection in your lungs caused by bacteria, viruses, fungi, or parasites  You can become infected if you come in contact with someone who is sick  You can get pneumonia if you recently had surgery or needed a ventilator to help you breathe  Pneumonia can also be caused by accidentally inhaling saliva or small pieces of food  Pneumonia may cause mild symptoms, or it can be severe and life-threatening  DISCHARGE INSTRUCTIONS:   Seek care immediately if:   · You cough up blood  · Your heart beats more than 100 beats in 1 minute  · You are very tired, confused, and cannot think clearly  · You have chest pain or trouble breathing  · Your lips or fingernails turn gray or blue    Contact your healthcare provider if:   · Your symptoms are the same or get worse 48 hours after you start antibiotics  · Your fever is not below 99°F (37 2°C) 48 hours after you start antibiotics  · You have a fever higher than 101°F (38 3°C)  · You cannot eat, or you have loss of appetite, nausea, or are vomiting  · You have questions or concerns about your condition or care  Medicines:   · Antibiotics  treat pneumonia caused by bacteria  · Acetaminophen  decreases pain and fever  It is available without a doctor's order  Ask how much to take and how often to take it  Follow directions  Read the labels of all other medicines you are using to see if they also contain acetaminophen, or ask your doctor or pharmacist  Acetaminophen can cause liver damage if not taken correctly  Do not use more than 4 grams (4,000 milligrams) total of acetaminophen in one day  · NSAIDs , such as ibuprofen, help decrease swelling, pain, and fever  This medicine is available with or without a doctor's order  NSAIDs can cause stomach bleeding or kidney problems in certain people  If you take blood thinner medicine, always ask your healthcare provider if NSAIDs are safe for you  Always read the medicine label and follow directions  · Take your medicine as directed  Contact your healthcare provider if you think your medicine is not helping or if you have side effects  Tell him or her if you are allergic to any medicine  Keep a list of the medicines, vitamins, and herbs you take  Include the amounts, and when and why you take them  Bring the list or the pill bottles to follow-up visits  Carry your medicine list with you in case of an emergency  Follow up with your healthcare provider as directed: You will need to return for more tests  Write down your questions so you remember to ask them during your visits  Manage your symptoms:   · Rest as needed  Rest often throughout the day  Alternate times of activity with times of rest     · Drink liquids as directed    Ask how much liquid to drink each day and which liquids are best for you  Liquids help thin your mucus, which may make it easier for you to cough it up  · Do not smoke  Avoid secondhand smoke  Smoking increases your risk for pneumonia  Smoking also makes it harder for you to get better after you have had pneumonia  Ask your healthcare provider for information if you need help to quit smoking  · Use a cool mist humidifier  A humidifier will help increase air moisture in your home  This may make it easier for you to breathe and help decrease your cough  · Keep your head elevated  You may be able to breathe better if you lie down with the head of your bed up  Prevent pneumonia:   · Prevent the spread of germs  Wash your hands often with soap and water  Use gel hand cleanser when there is no soap and water available  Do not touch your eyes, nose, or mouth unless you have washed your hands first  Cover your mouth when you cough  Cough into a tissue or your shirtsleeve so you do not spread germs from your hands  If you are sick, stay away from others as much as possible  · Limit alcohol  Women should limit alcohol to 1 drink a day  Men should limit alcohol to 2 drinks a day  A drink of alcohol is 12 ounces of beer, 5 ounces of wine, or 1½ ounces of liquor  · Ask about vaccines  You may need a vaccine to help prevent pneumonia  Get an influenza (flu) vaccine every year as soon as it becomes available  © 2017 [address] [employer] Information is for End User's use only and may not be sold, redistributed or otherwise used for commercial purposes  All illustrations and images included in CareNotes® are the copyrighted property of A D A M , Inc  or [employer]  The above information is an  only  It is not intended as medical advice for individual conditions or treatments  Talk to your doctor, nurse or pharmacist before following any medical regimen to see if it is safe and effective for you

## 2017-10-16 NOTE — ED PROVIDER NOTES
History  Chief Complaint   Patient presents with    Shortness of Breath     Pt here 2 nights ago for SOB, patient states it has not become any better  Patient with expiratory wheezing and tachypnea      68-year-old female presenting here today with a chief complaint of worsening breathing symptoms  She was seen here a few days ago diagnosed with bronchitis sent home with steroids and nebulized breathing treatments and is verbalizing that she is not improving at all  Patient's spouse reports that she was up all night having difficulty breathing  She appears moderately anxious and panicked  She has a raspy voice is speaking in a raspy tone  Patient's spouse reports that when she gets bronchitis and often ends up in pneumonia  He is a paramedic  He hears generalized crackles I hear crackles in the left lower lung base  She does have a history of some type of clotting disorder which I do not know what it is he had but given that information I will evaluate with CT of the chest to evaluate for both pulmonary embolism and pneumonia  Will recheck laboratory studies to ensure that there is no sepsis syndrome  Prior to Admission Medications   Prescriptions Last Dose Informant Patient Reported? Taking? HYDROcodone-homatropine (HYCODAN) 5-1 5 mg/5 mL syrup   No No   Sig: Take 5 mL by mouth 4 (four) times a day as needed for cough for up to 10 days Max Daily Amount: 20 mL   Norethindrone (JENCYCLA PO)   Yes No   Sig: Take by mouth   albuterol (2 5 mg/3 mL) 0 083 % nebulizer solution   No No   Sig: Take 3 mL by nebulization every 6 (six) hours as needed for wheezing   predniSONE 20 mg tablet   No No   Sig: Take 2 tablets by mouth daily for 5 days      Facility-Administered Medications: None       Past Medical History:   Diagnosis Date    APS (autoimmune polyglandular syndrome) ([facility] [address] )        Past Surgical History:   Procedure Laterality Date     SECTION         History reviewed   No pertinent family history  I have reviewed and agree with the history as documented  Social History   Substance Use Topics    Smoking status: Passive Smoke Exposure - Never Smoker    Smokeless tobacco: Never Used    Alcohol use Yes      Comment: occasional        Review of Systems   Constitutional: Positive for fever  Negative for activity change and fatigue  HENT: Negative for congestion, ear pain, rhinorrhea and sore throat  Eyes: Negative  Respiratory: Positive for cough and wheezing  Negative for shortness of breath  Gastrointestinal: Negative for abdominal pain, diarrhea, nausea and vomiting  Endocrine: Negative  Genitourinary: Negative for difficulty urinating, dyspareunia, dysuria, flank pain, frequency, menstrual problem, pelvic pain, urgency, vaginal bleeding, vaginal discharge and vaginal pain  Musculoskeletal: Negative for arthralgias and myalgias  Skin: Negative for color change and pallor  Neurological: Negative for dizziness, speech difficulty, weakness and headaches  Hematological: Negative for adenopathy  Psychiatric/Behavioral: Negative for confusion  Physical Exam  ED Triage Vitals [10/16/17 1139]   Temperature Pulse Respirations Blood Pressure SpO2   98 6 °F (37 °C) 69 (!) 24 127/75 97 %      Temp Source Heart Rate Source Patient Position - Orthostatic VS BP Location FiO2 (%)   Oral Monitor Sitting Right arm --      Pain Score       No Pain           Physical Exam   Constitutional: She is oriented to person, place, and time  She appears well-developed and well-nourished  She is cooperative  Non-toxic appearance  She does not have a sickly appearance  She does not appear ill  No distress  HENT:   Head: Normocephalic and atraumatic  Right Ear: Tympanic membrane and external ear normal    Left Ear: Tympanic membrane and external ear normal    Nose: No rhinorrhea, sinus tenderness or nasal deformity  No epistaxis   Right sinus exhibits no maxillary sinus tenderness and no frontal sinus tenderness  Left sinus exhibits no maxillary sinus tenderness and no frontal sinus tenderness  Mouth/Throat: Oropharynx is clear and moist and mucous membranes are normal  Normal dentition  Eyes: EOM are normal  Pupils are equal, round, and reactive to light  Neck: Normal range of motion  Neck supple  Cardiovascular: Normal rate, regular rhythm and normal heart sounds  No murmur heard  Pulmonary/Chest: No accessory muscle usage  Tachypnea noted  She is in respiratory distress  She has decreased breath sounds  She has wheezes  She has rhonchi in the left lower field  She has no rales  She exhibits no tenderness  Abdominal: Soft  She exhibits no distension  There is no guarding  Musculoskeletal: Normal range of motion  She exhibits no edema or tenderness  Lymphadenopathy:     She has no cervical adenopathy  Neurological: She is alert and oriented to person, place, and time  She exhibits normal muscle tone  Skin: Skin is warm and dry  No rash noted  No erythema  Psychiatric: Her mood appears anxious  Appears anxious breathing rapidly  Hyperventilating  Nursing note and vitals reviewed        ED Medications  Medications   ipratropium-albuterol (DUO-NEB) 0 5-2 5 mg/mL inhalation solution **AcuDose Override Pull** (3 mL  Given 10/16/17 1321)   sodium chloride 0 9 % inhalation solution **AcuDose Override Pull** (  Given 10/16/17 1321)   albuterol inhalation solution 5 mg (2 5 mg Nebulization Given 10/16/17 1441)   ipratropium (ATROVENT) 0 02 % inhalation solution 0 5 mg (0 5 mg Nebulization Given 10/16/17 1442)   sodium chloride 0 9 % bolus 1,000 mL (0 mL Intravenous Stopped 10/16/17 1441)   methylPREDNISolone sodium succinate (Solu-MEDROL) injection 125 mg (125 mg Intravenous Given 10/16/17 1314)   LORazepam (ATIVAN) 2 mg/mL injection 1 mg ( Intravenous Given 10/16/17 1316)   iohexol (OMNIPAQUE) 350 MG/ML injection (MULTI-DOSE) 85 mL (85 mL Intravenous Given 10/16/17 1308) doxycycline hyclate (VIBRAMYCIN) capsule 100 mg (100 mg Oral Given 10/16/17 1442)       Diagnostic Studies  Labs Reviewed   COMPREHENSIVE METABOLIC PANEL - Abnormal        Result Value Ref Range Status    Potassium 3 3 (*) 3 5 - 5 3 mmol/L Final    Comment: Slightly Hemolyzed; Results May be Affected    Sodium 140  136 - 145 mmol/L Final    Chloride 102  100 - 108 mmol/L Final    CO2 28  21 - 32 mmol/L Final    Anion Gap 10  4 - 13 mmol/L Final    BUN 8  5 - 25 mg/dL Final    Creatinine 0 86  0 60 - 1 30 mg/dL Final    Comment: Standardized to IDMS reference method    Glucose 93  65 - 140 mg/dL Final    Comment:   If the patient is fasting, the ADA then defines impaired fasting glucose as > 100 mg/dL and diabetes as > or equal to 123 mg/dL  Specimen collection should occur prior to Sulfasalazine administration due to the potential for falsely depressed results  Specimen collection should occur prior to Sulfapyridine administration due to the potential for falsely elevated results  Calcium 9 1  8 3 - 10 1 mg/dL Final    AST 27  5 - 45 U/L Final    Comment: Slightly Hemolyzed; Results May be Affected  Specimen collection should occur prior to Sulfasalazine administration due to the potential for falsely depressed results  ALT 31  12 - 78 U/L Final    Comment:   Specimen collection should occur prior to Sulfasalazine administration due to the potential for falsely depressed results  Alkaline Phosphatase 61  46 - 116 U/L Final    Total Protein 7 6  6 4 - 8 2 g/dL Final    Albumin 3 5  3 5 - 5 0 g/dL Final    Total Bilirubin 0 40  0 20 - 1 00 mg/dL Final    eGFR 89  ml/min/1 73sq m Final    Narrative:     National Kidney Disease Education Program recommendations are as follows:  GFR calculation is accurate only with a steady state creatinine  Chronic Kidney disease less than 60 ml/min/1 73 sq  meters  Kidney failure less than 15 ml/min/1 73 sq  meters     CBC AND DIFFERENTIAL - Normal    WBC 8 49  4 31 - 10 16 Thousand/uL Final    RBC 4 29  3 81 - 5 12 Million/uL Final    Hemoglobin 12 7  11 5 - 15 4 g/dL Final    Hematocrit 37 8  34 8 - 46 1 % Final    MCV 88  82 - 98 fL Final    MCH 29 6  26 8 - 34 3 pg Final    MCHC 33 6  31 4 - 37 4 g/dL Final    RDW 12 6  11 6 - 15 1 % Final    MPV 10 6  8 9 - 12 7 fL Final    Platelets 419  815 - 390 Thousands/uL Final    nRBC 0  /100 WBCs Final    Neutrophils Relative 75  43 - 75 % Final    Lymphocytes Relative 17  14 - 44 % Final    Monocytes Relative 6  4 - 12 % Final    Eosinophils Relative 1  0 - 6 % Final    Basophils Relative 0  0 - 1 % Final    Neutrophils Absolute 6 35  1 85 - 7 62 Thousands/µL Final    Lymphocytes Absolute 1 47  0 60 - 4 47 Thousands/µL Final    Monocytes Absolute 0 50  0 17 - 1 22 Thousand/µL Final    Eosinophils Absolute 0 12  0 00 - 0 61 Thousand/µL Final    Basophils Absolute 0 01  0 00 - 0 10 Thousands/µL Final   LACTIC ACID, PLASMA - Normal    LACTIC ACID 1 5  0 5 - 2 0 mmol/L Final    Narrative:     Result may be elevated if tourniquet was used during collection  LIGHT BLUE TOP   GREEN / BLACK TUBE ON HOLD    Extra Tube Hold for Add-on   Final       CTA ED chest PE study   Final Result      No pulmonary embolism  Bilateral multifocal consolidation and tiny reticulonodular densities, likely related to infectious/inflammatory process  Suggest follow-up in 3-6 months to ensure resolution  Suspected reactive mediastinal and hilar adenopathy also noted  Indeterminate 1 6 cm left adrenal nodule  Suggest adrenal protocol CT scan for further characterization        ##[id number]         Workstation performed: [id number]             Procedures  Procedures      Phone Contacts  ED Phone Contact    ED Course  ED Course                                MDM  Number of Diagnoses or Management Options  Acute bronchitis, unspecified organism: new and requires workup  Community acquired pneumonia, unspecified laterality: new and requires workup  Diagnosis management comments: Patient appears to be improved no hypoxia no elevation white blood cell count the lactic acid decreased in respiratory rate  Okay for discharge home  Amount and/or Complexity of Data Reviewed  Clinical lab tests: reviewed and ordered  Tests in the radiology section of CPT®: reviewed and ordered  Obtain history from someone other than the patient: yes  Review and summarize past medical records: yes  Discuss the patient with other providers: yes  Independent visualization of images, tracings, or specimens: yes    Patient Progress  Patient progress: improved    CritCare Time    Disposition  Final diagnoses:   Community acquired pneumonia, unspecified laterality   Acute bronchitis, unspecified organism     ED Disposition     ED Disposition Condition Comment    Discharge  [facility] discharge to home/self care      Condition at discharge: Good        Follow-up Information     Follow up With Specialties Details Why Contact Info    Anthony Whitehead, [address] In 3 days For Recheck [address]  [phone]          Patient's Medications   Discharge Prescriptions    ALBUTEROL (2 5 MG/3 ML) 0 083 % NEBULIZER SOLUTION    Take 3 mL by nebulization every 6 (six) hours as needed for wheezing or shortness of breath for up to 7 days       Start Date: 10/16/2017End Date: 10/23/2017       Order Dose: 2 5 mg       Quantity: 75 mL    Refills: 0    DOXYCYCLINE HYCLATE (VIBRAMYCIN) 100 MG CAPSULE    Take 1 capsule by mouth 2 (two) times a day for 10 days       Start Date: 10/16/2017End Date: 10/26/2017       Order Dose: 100 mg       Quantity: 20 capsule    Refills: 0    GUAIFENESIN (MUCINEX) 600 MG 12 HR TABLET    Take 1 tablet by mouth every 12 (twelve) hours for 5 days       Start Date: 10/16/2017End Date: 10/21/2017       Order Dose: 600 mg       Quantity: 10 tablet    Refills: 0    IPRATROPIUM (ATROVENT) 0 02 % NEBULIZER SOLUTION    Take 2 5 mL by nebulization 3 (three) times a day for 5 days       Start Date: 10/16/2017End Date: 10/21/2017       Order Dose: 0 5 mg       Quantity: 75 mL    Refills: 0    PREDNISONE 20 MG TABLET    Take 3 tablets by mouth daily for 5 days       Start Date: 10/16/2017End Date: 10/21/2017       Order Dose: 60 mg       Quantity: 15 tablet    Refills: 0     No discharge procedures on file      ED Provider  Electronically Signed by       JEREMI Law  10/16/17 1705

## 2018-01-13 VITALS
HEIGHT: 60 IN | HEART RATE: 75 BPM | SYSTOLIC BLOOD PRESSURE: 115 MMHG | OXYGEN SATURATION: 97 % | BODY MASS INDEX: 37.97 KG/M2 | WEIGHT: 193.4 LBS | DIASTOLIC BLOOD PRESSURE: 80 MMHG

## 2018-01-15 NOTE — RESULT NOTES
Discussion/Summary   inform thyroid panel all = wnl    iron slightly low but TIBC and iron sat = WNL   lyme , RA, MERRITT, sed rate, ferritin = WNL    calcium, vit d,  antiphos still pending     Verified Results  (1) CBC/PLT/DIFF [id number] 05:35PM [facility]     Test Name Result Flag Reference   WBC COUNT 8 90 Thousand/uL  4 31-10 16   RBC COUNT 5 02 Million/uL  3 81-5 12   HEMOGLOBIN 15 2 g/dL  11 5-15 4   HEMATOCRIT 45 4 %  34 8-46  1   MCV 90 fL  82-98   MCH 30 3 pg  26 8-34 3   MCHC 33 5 g/dL  31 4-37 4   RDW 13 2 %  11 6-15 1   MPV 9 7 fL  8 9-12 7   PLATELET COUNT 912 Thousands/uL  149-390   nRBC AUTOMATED 0 /100 WBCs     NEUTROPHILS RELATIVE PERCENT 58 %  43-75   LYMPHOCYTES RELATIVE PERCENT 28 %  14-44   MONOCYTES RELATIVE PERCENT 6 %  4-12   EOSINOPHILS RELATIVE PERCENT 8 % H 0-6   BASOPHILS RELATIVE PERCENT 0 %  0-1   NEUTROPHILS ABSOLUTE COUNT 5 10 Thousands/? ??L  1 85-7 62   LYMPHOCYTES ABSOLUTE COUNT 2 48 Thousands/? ??L  0 60-4 47   MONOCYTES ABSOLUTE COUNT 0 56 Thousand/? ??L  0 17-1 22   EOSINOPHILS ABSOLUTE COUNT 0 72 Thousand/? ??L H 0 00-0 61   BASOPHILS ABSOLUTE COUNT 0 02 Thousands/? ??L  0 00-0 10     (1) TSH [id number] 05:35PM [facility]     Test Name Result Flag Reference   TSH 1 700 uIU/mL  0 358-3 740   Patients undergoing fluorescein dye angiography may retain small amounts of fluorescein in the body for 48-72 hours post procedure  Samples containing fluorescein can produce falsely depressed TSH values  If the patient had this procedure,a specimen should be resubmitted post fluorescein clearance            The recommended reference ranges for TSH during pregnancy are as follows:  First trimester 0 1 to 2 5 uIU/mL  Second trimester  0 2 to 3 0 uIU/mL  Third trimester 0 3 to 3 0 uIU/m     (1) T4, FREE [id number] 05:35PM [facility]     Test Name Result Flag Reference   T4,FREE 0 97 ng/dL  0 76-1 46     (1) T3 TOTAL [id number] 05:35PM [facility]     Test Name Result Flag Reference   T3 0 8 ng/mL  0 6-1 8     (1) THYROGLOBULIN/QUANT W/ANTIBODY PANEL [id number] 05:35PM Delfina Roa Order Number: [id number]     Test Name Result Flag Reference   THYROGLOB AB <1 0 IU/mL  0 0 - 0 9   Thyroglobulin Antibody measured by [facility] Methodology    Performed at:  [address] [place] [address]  [id number]  : Daryn Koroma MD, Phone:  [phone]   THYROGLOBULIN-HILARIO 4 9 ng/mL  1 5 - 38 5   According to the [facility] of Clinical Biochemistry, the  reference interval for Thyroglobulin (TG) should be related to  euthyroid patients and not for patients who underwent thyroidectomy  TG reference intervals for these patients depend on the residual  mass of the thyroid tissue left after surgery  Establishing a  post-operative baseline is recommended  The assay limit of quantitation is 0 1 ng/mL  Thyroglobulin measured by [facility] Immunometric Assay    Performed at:  [address] [place] [address]  [id number]  : Daryn Koroma MD, Phone:  [phone]     (1) THYROID MICROSOMAL ANTIBODY [id number] 05:35PM Delfina Roa Order Number: [id number]     Test Name Result Flag Reference   [facility] MICROSOM AB 22 IU/mL  0 - 34   Performed at:  [address] [place] [address]  [id number]  : Daryn Koroma MD, Phone:  [phone]     (1) REVERSE T3 [id number] 05:35PM Delfina Roa Order Number: [id number]     Test Name Result Flag Reference   REVERSE T3 15 4 ng/dL  9 2 - 24 1   Performed at:  [address]  [id number]  : Bard Do AC, Phone:  [phone]     (1) IRON PANEL [id number] 05:35PM Alexander Jules     Test Name Result Flag Reference   IRON 41 ug/dL L    Patients treated with metal-binding drugs (ie  Deferoxamine) may have depressed iron values     FERRITIN 15 ng/mL  8-388   TOTAL IRON BINDING CAPACITY 405 ug/dL  250-450   IRON SATURATION 10 %       (1) COMPREHENSIVE METABOLIC PANEL [id number] 81:30HW [facility]     Test Name Result Flag Reference   SODIUM 135 mmol/L L 136-145   POTASSIUM 3 7 mmol/L  3 5-5 3   CHLORIDE 104 mmol/L  100-108   CARBON DIOXIDE 26 mmol/L  21-32   ANION GAP (CALC) 5 mmol/L  4-13   BLOOD UREA NITROGEN 12 mg/dL  5-25   CREATININE 0 74 mg/dL  0 60-1 30   Standardized to IDMS reference method   CALCIUM 9 2 mg/dL  8 3-10 1   BILI, TOTAL 0 19 mg/dL L 0 20-1 00   ALK PHOSPHATAS 66 U/L     ALT (SGPT) 19 U/L  12-78   AST(SGOT) 9 U/L  5-45   ALBUMIN 4 2 g/dL  3 5-5 0   TOTAL PROTEIN 8 2 g/dL  6 4-8 2   eGFR Non-African American      >60 0 ml/min/1 73sq m   [facility] Disease Education Program recommendations are as follows:  GFR calculation is accurate only with a steady state creatinine  Chronic Kidney disease less than 60 ml/min/1 73 sq  meters  Kidney failure less than 15 ml/min/1 73 sq  meters  GLUCOSE FASTING 81 mg/dL  65-99     (1) VITAMIN B12 [id number] 05:35PM [facility]     Test Name Result Flag Reference   VITAMIN B12 411 pg/mL  100-900     (1) RHEUMATOID FACTOR SCREEN [id number] 05:35PM [facility] Order Number: [id number]     Test Name Result Flag Reference   RHEUMATOID FACTOR Negative  Negative     (1) MERRITT SCREEN W/REFLEX TO TITER/PATTERN [id number] 05:35PM [facility] Order Number: [id number]     Test Name Result Flag Reference   MERRITT SCREEN  Negative  Negative     (1) LYME ANTIBODY PROFILE W/REFLEX TO WESTERN BLOT [id number] 05:35PM [facility] Order Number: [id number]     Test Name Result Flag Reference   LYME IGG 0 13  0 00-0 79   NEGATIVE(0 00-0 79)-Absence of detectable Borrelia IgG Antibodies  A negative result does not exclude the possibility of Borrelia infection  If early Lyme disease is suspected,a second sample should be collected & tested 4 weeks after initial testing     LYME IGM 0 24  0 00-0 79   NEGATIVE (0  00-0 79)-Absence of detectable Borrelia IgM antibodies  A negative result does not exclude the possibility of Borrelia infection  If early lyme disease is suspected, a second sample should be collected & tested 4 weeks after initial testing

## 2018-03-22 ENCOUNTER — OFFICE VISIT (OUTPATIENT)
Dept: FAMILY MEDICINE CLINIC | Facility: CLINIC | Age: 33
End: 2018-03-22
Payer: COMMERCIAL

## 2018-03-22 VITALS
TEMPERATURE: 98.4 F | OXYGEN SATURATION: 97 % | WEIGHT: 202 LBS | HEART RATE: 90 BPM | DIASTOLIC BLOOD PRESSURE: 74 MMHG | HEIGHT: 60 IN | BODY MASS INDEX: 39.66 KG/M2 | SYSTOLIC BLOOD PRESSURE: 128 MMHG

## 2018-03-22 DIAGNOSIS — M25.50 ARTHRALGIA, UNSPECIFIED JOINT: Primary | ICD-10-CM

## 2018-03-22 PROCEDURE — 99214 OFFICE O/P EST MOD 30 MIN: CPT | Performed by: FAMILY MEDICINE

## 2018-03-22 PROCEDURE — 1036F TOBACCO NON-USER: CPT | Performed by: FAMILY MEDICINE

## 2018-03-22 PROCEDURE — 3008F BODY MASS INDEX DOCD: CPT | Performed by: FAMILY MEDICINE

## 2018-03-22 NOTE — PROGRESS NOTES
Assessment/Plan:         Diagnoses and all orders for this visit:    Arthralgia, unspecified joint  -     Ambulatory referral to Rheumatology; Future      discussed with patient and family plan of care and acknowledge the frustration of unknown condition, APS can have  multifactorial aligning autoimmune conditions best evaluated by spec  For definitive analysis  Reviewed available labs all of which are WNL , will defer any further work up to spec , pt / family agree with plan         Subjective:      Patient ID: Erik May is a 28 y o  female  Hurts all over , fingers are always tender , thumbs , both hands , right wrist up left , right thumb up   Comes in waves , tapers off then returns , exacerbated with movements ,   Has been going on for years ,   "Has been dx with APS , eight yrs ago , 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, and blood levels were high , they tend to be higher after the miscarriages"   Neg hx of DVT /   Neg rash lesion , right now the biggest issue is my hands swell up , increases in the morning, depending on the day ,   relief with tumeric and nsaids , lasting about 2hrs     Sleep issues able to get to  Sleep but staying asleep is a problems  Does not snore  Denies tender points , denies shoulder, hip discomfort neg swelling to lower ext,        Children x 4 and that is stressful   Aunt with Raynaud's   Psoriatic disease on mothers side    Feels she has the patches of psoriasis but only under stressful moments       Has been to multiple provider over the years with no treatment, hs never been to rheumatology ,                 The following portions of the patient's history were reviewed and updated as appropriate:   She has a past medical history of APS (autoimmune polyglandular syndrome) ([facility] [address] )  ,   does not have any pertinent problems on file  ,   has a past surgical history that includes  section  ,  family history is not on file  ,   reports that she is a non-smoker but has been exposed to tobacco smoke  She has never used smokeless tobacco  She reports that she drinks alcohol  She reports that she does not use drugs  ,  is allergic to valacyclovir         Review of Systems   Constitutional: Positive for activity change, fatigue and unexpected weight change  HENT: Negative  Eyes: Negative  Respiratory: Negative  Cardiovascular: Negative  Gastrointestinal: Negative  Endocrine: Positive for cold intolerance and heat intolerance  Genitourinary: Negative  Musculoskeletal: Positive for arthralgias and joint swelling  Negative for back pain, gait problem, myalgias, neck pain and neck stiffness  Neurological: Negative  Hematological: Negative  Psychiatric/Behavioral: Positive for sleep disturbance  Objective:  Vitals:    03/22/18 1506   BP: 128/74   Pulse: 90   Temp: 98 4 °F (36 9 °C)   SpO2: 97%      Physical Exam   Constitutional: She is oriented to person, place, and time  She appears well-developed and well-nourished  HENT:   Head: Normocephalic and atraumatic  Eyes: EOM are normal    Neck: Normal range of motion  Neck supple  Cardiovascular: Normal rate, regular rhythm and normal heart sounds  Pulmonary/Chest: Effort normal and breath sounds normal    Musculoskeletal: Normal range of motion  Right shoulder: Normal    Negative tenderness noted to girdle/ trunk   Upper ext FRoM   No apparent distal ext swelling , bilat   Neurological: She is alert and oriented to person, place, and time  She has normal reflexes  Skin: Skin is warm, dry and intact  No bruising, no lesion and no rash noted  No cyanosis (normal inappearnace , neg splintering)  Nails show no clubbing  Neg psoriatic patches, neg levit reticulae     Psychiatric: Thought content normal  Her affect is labile  Her speech is rapid and/or pressured and tangential  She is aggressive   Cognition and memory are normal

## 2018-05-17 ENCOUNTER — APPOINTMENT (OUTPATIENT)
Dept: LAB | Facility: HOSPITAL | Age: 33
End: 2018-05-17
Attending: INTERNAL MEDICINE
Payer: COMMERCIAL

## 2018-05-17 ENCOUNTER — TRANSCRIBE ORDERS (OUTPATIENT)
Dept: LAB | Facility: HOSPITAL | Age: 33
End: 2018-05-17

## 2018-05-17 DIAGNOSIS — M25.50 PAIN IN JOINT, MULTIPLE SITES: Primary | ICD-10-CM

## 2018-05-17 DIAGNOSIS — M25.50 PAIN IN JOINT, MULTIPLE SITES: ICD-10-CM

## 2018-05-17 LAB
ALBUMIN SERPL BCP-MCNC: 3.7 G/DL (ref 3.5–5)
ALP SERPL-CCNC: 52 U/L (ref 46–116)
ALT SERPL W P-5'-P-CCNC: 19 U/L (ref 12–78)
ANION GAP SERPL CALCULATED.3IONS-SCNC: 8 MMOL/L (ref 4–13)
AST SERPL W P-5'-P-CCNC: 5 U/L (ref 5–45)
BASOPHILS # BLD AUTO: 0.03 THOUSANDS/ΜL (ref 0–0.1)
BASOPHILS NFR BLD AUTO: 0 % (ref 0–1)
BILIRUB SERPL-MCNC: 0.26 MG/DL (ref 0.2–1)
BUN SERPL-MCNC: 10 MG/DL (ref 5–25)
C3 SERPL-MCNC: 110 MG/DL (ref 90–180)
C4 SERPL-MCNC: 18 MG/DL (ref 10–40)
CALCIUM SERPL-MCNC: 8.6 MG/DL (ref 8.3–10.1)
CHLORIDE SERPL-SCNC: 109 MMOL/L (ref 100–108)
CO2 SERPL-SCNC: 23 MMOL/L (ref 21–32)
CREAT SERPL-MCNC: 0.64 MG/DL (ref 0.6–1.3)
CRP SERPL QL: 6.7 MG/L
EOSINOPHIL # BLD AUTO: 0.95 THOUSAND/ΜL (ref 0–0.61)
EOSINOPHIL NFR BLD AUTO: 10 % (ref 0–6)
ERYTHROCYTE [DISTWIDTH] IN BLOOD BY AUTOMATED COUNT: 12.6 % (ref 11.6–15.1)
ERYTHROCYTE [SEDIMENTATION RATE] IN BLOOD: 9 MM/HOUR (ref 0–20)
GFR SERPL CREATININE-BSD FRML MDRD: 117 ML/MIN/1.73SQ M
GLUCOSE SERPL-MCNC: 94 MG/DL (ref 65–140)
HCT VFR BLD AUTO: 39.8 % (ref 34.8–46.1)
HGB BLD-MCNC: 13.8 G/DL (ref 11.5–15.4)
IGA SERPL-MCNC: 109 MG/DL (ref 70–400)
IGG SERPL-MCNC: 758 MG/DL (ref 700–1600)
IGM SERPL-MCNC: 471 MG/DL (ref 40–230)
LYMPHOCYTES # BLD AUTO: 2.83 THOUSANDS/ΜL (ref 0.6–4.47)
LYMPHOCYTES NFR BLD AUTO: 28 % (ref 14–44)
MCH RBC QN AUTO: 30.7 PG (ref 26.8–34.3)
MCHC RBC AUTO-ENTMCNC: 34.7 G/DL (ref 31.4–37.4)
MCV RBC AUTO: 88 FL (ref 82–98)
MONOCYTES # BLD AUTO: 0.56 THOUSAND/ΜL (ref 0.17–1.22)
MONOCYTES NFR BLD AUTO: 6 % (ref 4–12)
NEUTROPHILS # BLD AUTO: 5.56 THOUSANDS/ΜL (ref 1.85–7.62)
NEUTS SEG NFR BLD AUTO: 56 % (ref 43–75)
NRBC BLD AUTO-RTO: 0 /100 WBCS
PLATELET # BLD AUTO: 289 THOUSANDS/UL (ref 149–390)
PMV BLD AUTO: 9.5 FL (ref 8.9–12.7)
POTASSIUM SERPL-SCNC: 3.4 MMOL/L (ref 3.5–5.3)
PROT SERPL-MCNC: 7 G/DL (ref 6.4–8.2)
RBC # BLD AUTO: 4.5 MILLION/UL (ref 3.81–5.12)
SODIUM SERPL-SCNC: 140 MMOL/L (ref 136–145)
T4 SERPL-MCNC: 7.7 UG/DL (ref 4.7–13.3)
TSH SERPL DL<=0.05 MIU/L-ACNC: 1.78 UIU/ML (ref 0.36–3.74)
VIT B12 SERPL-MCNC: 490 PG/ML (ref 100–900)
WBC # BLD AUTO: 9.95 THOUSAND/UL (ref 4.31–10.16)

## 2018-05-17 PROCEDURE — 36415 COLL VENOUS BLD VENIPUNCTURE: CPT

## 2018-05-17 PROCEDURE — 82784 ASSAY IGA/IGD/IGG/IGM EACH: CPT

## 2018-05-17 PROCEDURE — 84443 ASSAY THYROID STIM HORMONE: CPT

## 2018-05-17 PROCEDURE — 86235 NUCLEAR ANTIGEN ANTIBODY: CPT

## 2018-05-17 PROCEDURE — 86200 CCP ANTIBODY: CPT

## 2018-05-17 PROCEDURE — 86430 RHEUMATOID FACTOR TEST QUAL: CPT

## 2018-05-17 PROCEDURE — 86146 BETA-2 GLYCOPROTEIN ANTIBODY: CPT

## 2018-05-17 PROCEDURE — 86140 C-REACTIVE PROTEIN: CPT

## 2018-05-17 PROCEDURE — 85025 COMPLETE CBC W/AUTO DIFF WBC: CPT

## 2018-05-17 PROCEDURE — 86800 THYROGLOBULIN ANTIBODY: CPT

## 2018-05-17 PROCEDURE — 84165 PROTEIN E-PHORESIS SERUM: CPT | Performed by: PATHOLOGY

## 2018-05-17 PROCEDURE — 80074 ACUTE HEPATITIS PANEL: CPT

## 2018-05-17 PROCEDURE — 82785 ASSAY OF IGE: CPT

## 2018-05-17 PROCEDURE — 86376 MICROSOMAL ANTIBODY EACH: CPT

## 2018-05-17 PROCEDURE — 85732 THROMBOPLASTIN TIME PARTIAL: CPT

## 2018-05-17 PROCEDURE — 81374 HLA I TYPING 1 ANTIGEN LR: CPT

## 2018-05-17 PROCEDURE — 84165 PROTEIN E-PHORESIS SERUM: CPT

## 2018-05-17 PROCEDURE — 84436 ASSAY OF TOTAL THYROXINE: CPT

## 2018-05-17 PROCEDURE — 82607 VITAMIN B-12: CPT

## 2018-05-17 PROCEDURE — 85613 RUSSELL VIPER VENOM DILUTED: CPT

## 2018-05-17 PROCEDURE — 83516 IMMUNOASSAY NONANTIBODY: CPT

## 2018-05-17 PROCEDURE — 85670 THROMBIN TIME PLASMA: CPT

## 2018-05-17 PROCEDURE — 85652 RBC SED RATE AUTOMATED: CPT

## 2018-05-17 PROCEDURE — 80053 COMPREHEN METABOLIC PANEL: CPT

## 2018-05-17 PROCEDURE — 86255 FLUORESCENT ANTIBODY SCREEN: CPT

## 2018-05-17 PROCEDURE — 86160 COMPLEMENT ANTIGEN: CPT

## 2018-05-17 PROCEDURE — 86225 DNA ANTIBODY NATIVE: CPT

## 2018-05-17 PROCEDURE — 86147 CARDIOLIPIN ANTIBODY EA IG: CPT

## 2018-05-17 PROCEDURE — 85705 THROMBOPLASTIN INHIBITION: CPT

## 2018-05-17 PROCEDURE — 86038 ANTINUCLEAR ANTIBODIES: CPT

## 2018-05-18 LAB
ALBUMIN SERPL ELPH-MCNC: 4.11 G/DL (ref 3.5–5)
ALBUMIN SERPL ELPH-MCNC: 59.5 % (ref 52–65)
ALPHA1 GLOB SERPL ELPH-MCNC: 0.34 G/DL (ref 0.1–0.4)
ALPHA1 GLOB SERPL ELPH-MCNC: 4.9 % (ref 2.5–5)
ALPHA2 GLOB SERPL ELPH-MCNC: 0.69 G/DL (ref 0.4–1.2)
ALPHA2 GLOB SERPL ELPH-MCNC: 10 % (ref 7–13)
APTT SCREEN TO CONFIRM RATIO: 1.13 RATIO (ref 0–1.4)
BETA GLOB ABNORMAL SERPL ELPH-MCNC: 0.46 G/DL (ref 0.4–0.8)
BETA1 GLOB SERPL ELPH-MCNC: 6.7 % (ref 5–13)
BETA2 GLOB SERPL ELPH-MCNC: 4.2 % (ref 2–8)
BETA2+GAMMA GLOB SERPL ELPH-MCNC: 0.29 G/DL (ref 0.2–0.5)
CARDIOLIPIN IGA SER IA-ACNC: <9 APL U/ML (ref 0–11)
CARDIOLIPIN IGG SER IA-ACNC: <9 GPL U/ML (ref 0–14)
CARDIOLIPIN IGM SER IA-ACNC: 16 MPL U/ML (ref 0–12)
CONFIRM APTT/NORMAL: 53.9 SEC (ref 0–55)
DSDNA AB SER-ACNC: <1 IU/ML (ref 0–9)
ENA RNP AB SER-ACNC: <0.2 AI (ref 0–0.9)
ENA SM AB SER-ACNC: <0.2 AI (ref 0–0.9)
ENA SS-A AB SER-ACNC: <0.2 AI (ref 0–0.9)
ENA SS-B AB SER-ACNC: <0.2 AI (ref 0–0.9)
ENDOMYSIUM IGA SER QL: NEGATIVE
GAMMA GLOB ABNORMAL SERPL ELPH-MCNC: 1.01 G/DL (ref 0.5–1.6)
GAMMA GLOB SERPL ELPH-MCNC: 14.7 % (ref 12–22)
GLIADIN PEPTIDE IGA SER-ACNC: 3 UNITS (ref 0–19)
GLIADIN PEPTIDE IGG SER-ACNC: 4 UNITS (ref 0–19)
HAV IGM SER QL: NORMAL
HBV CORE IGM SER QL: NORMAL
HBV SURFACE AG SER QL: NORMAL
HCV AB SER QL: NORMAL
IGA SERPL-MCNC: 93 MG/DL (ref 87–352)
IGG/ALB SER: 1.47 {RATIO} (ref 1.1–1.8)
LA PPP-IMP: NORMAL
PROT PATTERN SERPL ELPH-IMP: NORMAL
PROT SERPL-MCNC: 6.9 G/DL (ref 6.4–8.2)
RHEUMATOID FACT SER QL LA: NEGATIVE
RYE IGE QN: NEGATIVE
SCREEN APTT: 34.2 SEC (ref 0–51.9)
SCREEN DRVVT: 34.8 SEC (ref 0–47)
THROMBIN TIME: 15.9 SEC (ref 0–23)
THYROGLOB AB SERPL-ACNC: <1 IU/ML (ref 0–0.9)
THYROPEROXIDASE AB SERPL-ACNC: 14 IU/ML (ref 0–34)
TOTAL IGE SMQN RAST: 131 KU/L (ref 0–113)
TTG IGA SER-ACNC: <2 U/ML (ref 0–3)
TTG IGG SER-ACNC: <2 U/ML (ref 0–5)

## 2018-05-19 LAB
B2 GLYCOPROT1 IGA SER-ACNC: <9 GPI IGA UNITS (ref 0–25)
B2 GLYCOPROT1 IGG SER-ACNC: <9 GPI IGG UNITS (ref 0–20)
B2 GLYCOPROT1 IGM SER-ACNC: <9 GPI IGM UNITS (ref 0–32)
CCP IGA+IGG SERPL IA-ACNC: 3 UNITS (ref 0–19)

## 2018-05-22 LAB — HLA-B27 QL NAA+PROBE: NEGATIVE

## 2018-08-03 ENCOUNTER — TRANSCRIBE ORDERS (OUTPATIENT)
Dept: ADMINISTRATIVE | Facility: HOSPITAL | Age: 33
End: 2018-08-03

## 2018-08-03 DIAGNOSIS — M25.50 PAIN IN JOINT, MULTIPLE SITES: Primary | ICD-10-CM

## 2018-08-21 ENCOUNTER — HOSPITAL ENCOUNTER (OUTPATIENT)
Dept: ULTRASOUND IMAGING | Facility: HOSPITAL | Age: 33
Discharge: HOME | End: 2018-08-21
Attending: INTERNAL MEDICINE
Payer: COMMERCIAL

## 2018-08-21 DIAGNOSIS — M25.50 PAIN IN JOINT, MULTIPLE SITES: ICD-10-CM

## 2018-08-21 PROCEDURE — 76536 US EXAM OF HEAD AND NECK: CPT

## 2018-09-30 ENCOUNTER — AMB VIDEO VISIT (OUTPATIENT)
Dept: URGENT CARE | Facility: CLINIC | Age: 33
End: 2018-09-30

## 2018-09-30 DIAGNOSIS — R05.9 COUGH: Primary | ICD-10-CM

## 2018-09-30 PROCEDURE — EVISIT: Performed by: PHYSICIAN ASSISTANT

## 2018-09-30 NOTE — PROGRESS NOTES
[facility]        NAME: Marielle West is a 28 y o  female  : 1985    MRN: [id number]  DATE: 2018  TIME: 11:29 AM    Assessment and Plan   Cough [R05]  1  Cough           Patient Instructions      I discussed with the patient due to her complaints of shortness of breath and crackling she should have her lungs exam and and be seen by a provider at the Urgent Care the emergency room or her family doctor  She continue the cough medicines over-the-counter  Discussed with her would not be prudent to prescribe antibiotics or nebulizer treatments or medications without her being examined, checking oxygenation level  Proceed to  ER if symptoms worsen  Chief Complaint     Chief Complaint   Patient presents with    Cough         History of Present Illness        70-year-old female cause into the video visit system complaining of 6 days of cough and congestion  She does not know she had a fever home  She does report she feels short of breath and wheezing  She feels a crackling in her chest   She is a former smoker  She feels like she has sinus pain and pressure  She has a nebulizer at home but is out of the medicines  She says she gets pneumonia has had bronchitis before  She is taking DayQuil and NyQuil with minimal relief  She says she has a productive cough that is thick          Review of Systems   Review of Systems      Current Medications       Current Outpatient Prescriptions:     albuterol (2 5 mg/3 mL) 0 083 % nebulizer solution, Take 3 mL by nebulization every 6 (six) hours as needed for wheezing, Disp: 75 mL, Rfl: 0    ipratropium (ATROVENT) 0 02 % nebulizer solution, Take 2 5 mL by nebulization 3 (three) times a day for 5 days, Disp: 75 mL, Rfl: 0    Norethindrone (JENCYCLA PO), Take by mouth, Disp: , Rfl:     Current Allergies     Allergies as of 2018 - Reviewed 2018   Allergen Reaction Noted    Valacyclovir Itching and Swelling 2015 The following portions of the patient's history were reviewed and updated as appropriate: allergies, current medications, past family history, past medical history, past social history, past surgical history and problem list      Past Medical History:   Diagnosis Date    APS (autoimmune polyglandular syndrome) ([facility] [address] )        Past Surgical History:   Procedure Laterality Date     SECTION         No family history on file  Medications have been verified  Objective   LMP 10/13/2017 (Exact Date)        Physical Exam     Physical Exam   Constitutional: She appears well-developed and well-nourished  HENT:   Head: Normocephalic and atraumatic  Eyes: Right eye exhibits no discharge  Left eye exhibits no discharge  Neck: Normal range of motion  Pulmonary/Chest: No respiratory distress  She has a productive cough   Neurological: She is alert

## 2019-02-11 ENCOUNTER — TELEPHONE (OUTPATIENT)
Dept: PAIN MEDICINE | Facility: CLINIC | Age: 34
End: 2019-02-11

## 2019-02-11 NOTE — TELEPHONE ENCOUNTER
Pt came in and made a consult appt  She wants to know if Dr Florinda Ren would consider prescribing Naltrexone  She has used cymbalta, lyrica, planquinal, tizanidine   Pls advise

## 2019-03-18 ENCOUNTER — TELEPHONE (OUTPATIENT)
Dept: FAMILY MEDICINE CLINIC | Facility: CLINIC | Age: 34
End: 2019-03-18

## 2019-03-20 PROBLEM — M79.7 FIBROMYALGIA: Status: ACTIVE | Noted: 2019-03-20

## 2019-06-11 ENCOUNTER — APPOINTMENT (EMERGENCY)
Dept: CT IMAGING | Facility: HOSPITAL | Age: 34
End: 2019-06-11
Payer: COMMERCIAL

## 2019-06-11 ENCOUNTER — HOSPITAL ENCOUNTER (EMERGENCY)
Facility: HOSPITAL | Age: 34
Discharge: HOME | End: 2019-06-11
Attending: EMERGENCY MEDICINE | Admitting: EMERGENCY MEDICINE
Payer: COMMERCIAL

## 2019-06-11 ENCOUNTER — APPOINTMENT (EMERGENCY)
Dept: RADIOLOGY | Facility: HOSPITAL | Age: 34
End: 2019-06-11
Payer: COMMERCIAL

## 2019-06-11 VITALS
TEMPERATURE: 98.3 F | SYSTOLIC BLOOD PRESSURE: 117 MMHG | DIASTOLIC BLOOD PRESSURE: 56 MMHG | OXYGEN SATURATION: 99 % | RESPIRATION RATE: 20 BRPM | HEART RATE: 74 BPM | WEIGHT: 216.49 LBS | BODY MASS INDEX: 40.87 KG/M2 | HEIGHT: 61 IN

## 2019-06-11 DIAGNOSIS — J06.9 UPPER RESPIRATORY INFECTION: Primary | ICD-10-CM

## 2019-06-11 DIAGNOSIS — J20.9 ACUTE BRONCHITIS WITH BRONCHOSPASM: ICD-10-CM

## 2019-06-11 LAB
ALBUMIN SERPL BCP-MCNC: 3.8 G/DL (ref 3.5–5)
ALP SERPL-CCNC: 62 U/L (ref 46–116)
ALT SERPL W P-5'-P-CCNC: 20 U/L (ref 12–78)
ANION GAP SERPL CALCULATED.3IONS-SCNC: 10 MMOL/L (ref 4–13)
AST SERPL W P-5'-P-CCNC: 10 U/L (ref 5–45)
BASOPHILS # BLD AUTO: 0.05 THOUSANDS/ΜL (ref 0–0.1)
BASOPHILS NFR BLD AUTO: 1 % (ref 0–1)
BILIRUB DIRECT SERPL-MCNC: 0.05 MG/DL (ref 0–0.2)
BILIRUB SERPL-MCNC: 0.1 MG/DL (ref 0.2–1)
BILIRUB UR QL STRIP: NEGATIVE
BUN SERPL-MCNC: 13 MG/DL (ref 5–25)
CALCIUM SERPL-MCNC: 8.9 MG/DL (ref 8.3–10.1)
CHLORIDE SERPL-SCNC: 104 MMOL/L (ref 100–108)
CLARITY UR: CLEAR
CO2 SERPL-SCNC: 25 MMOL/L (ref 21–32)
COLOR UR: YELLOW
CREAT SERPL-MCNC: 0.87 MG/DL (ref 0.6–1.3)
EOSINOPHIL # BLD AUTO: 0.78 THOUSAND/ΜL (ref 0–0.61)
EOSINOPHIL NFR BLD AUTO: 10 % (ref 0–6)
ERYTHROCYTE [DISTWIDTH] IN BLOOD BY AUTOMATED COUNT: 12.6 % (ref 11.6–15.1)
EXT PREG TEST URINE: NEGATIVE
GFR SERPL CREATININE-BSD FRML MDRD: 87 ML/MIN/1.73SQ M
GLUCOSE SERPL-MCNC: 105 MG/DL (ref 65–140)
GLUCOSE UR STRIP-MCNC: NEGATIVE MG/DL
HCT VFR BLD AUTO: 39.3 % (ref 34.8–46.1)
HGB BLD-MCNC: 13.6 G/DL (ref 11.5–15.4)
HGB UR QL STRIP.AUTO: NEGATIVE
IMM GRANULOCYTES # BLD AUTO: 0.03 THOUSAND/UL (ref 0–0.2)
IMM GRANULOCYTES NFR BLD AUTO: 0 % (ref 0–2)
KETONES UR STRIP-MCNC: NEGATIVE MG/DL
LACTATE SERPL-SCNC: 0.9 MMOL/L (ref 0.5–2)
LEUKOCYTE ESTERASE UR QL STRIP: NEGATIVE
LYMPHOCYTES # BLD AUTO: 2.61 THOUSANDS/ΜL (ref 0.6–4.47)
LYMPHOCYTES NFR BLD AUTO: 33 % (ref 14–44)
MAGNESIUM SERPL-MCNC: 1.8 MG/DL (ref 1.6–2.6)
MCH RBC QN AUTO: 30.8 PG (ref 26.8–34.3)
MCHC RBC AUTO-ENTMCNC: 34.6 G/DL (ref 31.4–37.4)
MCV RBC AUTO: 89 FL (ref 82–98)
MONOCYTES # BLD AUTO: 0.45 THOUSAND/ΜL (ref 0.17–1.22)
MONOCYTES NFR BLD AUTO: 6 % (ref 4–12)
NEUTROPHILS # BLD AUTO: 3.92 THOUSANDS/ΜL (ref 1.85–7.62)
NEUTS SEG NFR BLD AUTO: 50 % (ref 43–75)
NITRITE UR QL STRIP: NEGATIVE
NRBC BLD AUTO-RTO: 0 /100 WBCS
PH UR STRIP.AUTO: 6 [PH]
PLATELET # BLD AUTO: 301 THOUSANDS/UL (ref 149–390)
PMV BLD AUTO: 9.1 FL (ref 8.9–12.7)
POTASSIUM SERPL-SCNC: 3.5 MMOL/L (ref 3.5–5.3)
PROT SERPL-MCNC: 7.3 G/DL (ref 6.4–8.2)
PROT UR STRIP-MCNC: NEGATIVE MG/DL
RBC # BLD AUTO: 4.42 MILLION/UL (ref 3.81–5.12)
SODIUM SERPL-SCNC: 139 MMOL/L (ref 136–145)
SP GR UR STRIP.AUTO: 1.02 (ref 1–1.03)
TROPONIN I SERPL-MCNC: <0.02 NG/ML
UROBILINOGEN UR QL STRIP.AUTO: 0.2 E.U./DL
WBC # BLD AUTO: 7.84 THOUSAND/UL (ref 4.31–10.16)

## 2019-06-11 PROCEDURE — 81003 URINALYSIS AUTO W/O SCOPE: CPT | Performed by: EMERGENCY MEDICINE

## 2019-06-11 PROCEDURE — 85025 COMPLETE CBC W/AUTO DIFF WBC: CPT | Performed by: EMERGENCY MEDICINE

## 2019-06-11 PROCEDURE — 84484 ASSAY OF TROPONIN QUANT: CPT | Performed by: EMERGENCY MEDICINE

## 2019-06-11 PROCEDURE — 80048 BASIC METABOLIC PNL TOTAL CA: CPT | Performed by: EMERGENCY MEDICINE

## 2019-06-11 PROCEDURE — 93005 ELECTROCARDIOGRAM TRACING: CPT

## 2019-06-11 PROCEDURE — 99284 EMERGENCY DEPT VISIT MOD MDM: CPT | Performed by: EMERGENCY MEDICINE

## 2019-06-11 PROCEDURE — 94760 N-INVAS EAR/PLS OXIMETRY 1: CPT

## 2019-06-11 PROCEDURE — 80076 HEPATIC FUNCTION PANEL: CPT | Performed by: EMERGENCY MEDICINE

## 2019-06-11 PROCEDURE — 83605 ASSAY OF LACTIC ACID: CPT | Performed by: EMERGENCY MEDICINE

## 2019-06-11 PROCEDURE — 94644 CONT INHLJ TX 1ST HOUR: CPT

## 2019-06-11 PROCEDURE — 99284 EMERGENCY DEPT VISIT MOD MDM: CPT

## 2019-06-11 PROCEDURE — 81025 URINE PREGNANCY TEST: CPT | Performed by: EMERGENCY MEDICINE

## 2019-06-11 PROCEDURE — 71275 CT ANGIOGRAPHY CHEST: CPT

## 2019-06-11 PROCEDURE — 96374 THER/PROPH/DIAG INJ IV PUSH: CPT

## 2019-06-11 PROCEDURE — 94640 AIRWAY INHALATION TREATMENT: CPT

## 2019-06-11 PROCEDURE — 96375 TX/PRO/DX INJ NEW DRUG ADDON: CPT

## 2019-06-11 PROCEDURE — 83735 ASSAY OF MAGNESIUM: CPT | Performed by: EMERGENCY MEDICINE

## 2019-06-11 PROCEDURE — 36415 COLL VENOUS BLD VENIPUNCTURE: CPT | Performed by: EMERGENCY MEDICINE

## 2019-06-11 PROCEDURE — 96361 HYDRATE IV INFUSION ADD-ON: CPT

## 2019-06-11 RX ORDER — PREDNISONE 20 MG/1
TABLET ORAL
Qty: 12 TABLET | Refills: 0 | Status: SHIPPED | OUTPATIENT
Start: 2019-06-11 | End: 2020-12-16

## 2019-06-11 RX ORDER — AZITHROMYCIN 250 MG/1
250 TABLET, FILM COATED ORAL DAILY
Qty: 4 TABLET | Refills: 0 | Status: SHIPPED | OUTPATIENT
Start: 2019-06-12 | End: 2019-06-16

## 2019-06-11 RX ORDER — METHYLPREDNISOLONE SODIUM SUCCINATE 125 MG/2ML
80 INJECTION, POWDER, LYOPHILIZED, FOR SOLUTION INTRAMUSCULAR; INTRAVENOUS ONCE
Status: COMPLETED | OUTPATIENT
Start: 2019-06-11 | End: 2019-06-11

## 2019-06-11 RX ORDER — SODIUM CHLORIDE FOR INHALATION 0.9 %
12 VIAL, NEBULIZER (ML) INHALATION ONCE
Status: COMPLETED | OUTPATIENT
Start: 2019-06-11 | End: 2019-06-11

## 2019-06-11 RX ORDER — ALBUTEROL SULFATE 90 UG/1
2 AEROSOL, METERED RESPIRATORY (INHALATION) ONCE
Status: COMPLETED | OUTPATIENT
Start: 2019-06-11 | End: 2019-06-11

## 2019-06-11 RX ORDER — KETOROLAC TROMETHAMINE 30 MG/ML
15 INJECTION, SOLUTION INTRAMUSCULAR; INTRAVENOUS ONCE
Status: COMPLETED | OUTPATIENT
Start: 2019-06-11 | End: 2019-06-11

## 2019-06-11 RX ORDER — ALBUTEROL SULFATE 2.5 MG/3ML
2.5 SOLUTION RESPIRATORY (INHALATION) EVERY 4 HOURS PRN
Qty: 75 ML | Refills: 0 | Status: SHIPPED | OUTPATIENT
Start: 2019-06-11 | End: 2021-03-25

## 2019-06-11 RX ORDER — AZITHROMYCIN 250 MG/1
500 TABLET, FILM COATED ORAL ONCE
Status: COMPLETED | OUTPATIENT
Start: 2019-06-11 | End: 2019-06-11

## 2019-06-11 RX ADMIN — METHYLPREDNISOLONE SODIUM SUCCINATE 80 MG: 125 INJECTION, POWDER, FOR SOLUTION INTRAMUSCULAR; INTRAVENOUS at 21:36

## 2019-06-11 RX ADMIN — ALBUTEROL SULFATE 2 PUFF: 90 AEROSOL, METERED RESPIRATORY (INHALATION) at 23:22

## 2019-06-11 RX ADMIN — IOHEXOL 100 ML: 350 INJECTION, SOLUTION INTRAVENOUS at 22:32

## 2019-06-11 RX ADMIN — SODIUM CHLORIDE 1000 ML: 0.9 INJECTION, SOLUTION INTRAVENOUS at 21:36

## 2019-06-11 RX ADMIN — KETOROLAC TROMETHAMINE 15 MG: 30 INJECTION, SOLUTION INTRAMUSCULAR at 21:36

## 2019-06-11 RX ADMIN — ISODIUM CHLORIDE 12 ML: 0.03 SOLUTION RESPIRATORY (INHALATION) at 21:26

## 2019-06-11 RX ADMIN — IPRATROPIUM BROMIDE 1 MG: 0.5 SOLUTION RESPIRATORY (INHALATION) at 21:26

## 2019-06-11 RX ADMIN — ALBUTEROL SULFATE 10 MG: 2.5 SOLUTION RESPIRATORY (INHALATION) at 21:26

## 2019-06-11 RX ADMIN — AZITHROMYCIN 500 MG: 250 TABLET, FILM COATED ORAL at 23:22

## 2019-06-12 LAB
ATRIAL RATE: 69 BPM
P AXIS: 17 DEGREES
PR INTERVAL: 142 MS
QRS AXIS: 19 DEGREES
QRSD INTERVAL: 82 MS
QT INTERVAL: 388 MS
QTC INTERVAL: 415 MS
T WAVE AXIS: 1 DEGREES
VENTRICULAR RATE: 69 BPM

## 2019-06-12 PROCEDURE — 93010 ELECTROCARDIOGRAM REPORT: CPT | Performed by: INTERNAL MEDICINE

## 2019-12-31 ENCOUNTER — HOSPITAL ENCOUNTER (OUTPATIENT)
Facility: HOSPITAL | Age: 34
Setting detail: OUTPATIENT SURGERY
Discharge: HOME | End: 2019-12-31
Attending: EMERGENCY MEDICINE | Admitting: SURGERY
Payer: COMMERCIAL

## 2019-12-31 ENCOUNTER — APPOINTMENT (INPATIENT)
Dept: RADIOLOGY | Facility: HOSPITAL | Age: 34
End: 2019-12-31
Payer: COMMERCIAL

## 2019-12-31 ENCOUNTER — APPOINTMENT (EMERGENCY)
Dept: CT IMAGING | Facility: HOSPITAL | Age: 34
End: 2019-12-31
Payer: COMMERCIAL

## 2019-12-31 ENCOUNTER — ANESTHESIA EVENT (INPATIENT)
Dept: PERIOP | Facility: HOSPITAL | Age: 34
End: 2019-12-31
Payer: COMMERCIAL

## 2019-12-31 ENCOUNTER — ANESTHESIA (INPATIENT)
Dept: PERIOP | Facility: HOSPITAL | Age: 34
End: 2019-12-31
Payer: COMMERCIAL

## 2019-12-31 VITALS
SYSTOLIC BLOOD PRESSURE: 101 MMHG | DIASTOLIC BLOOD PRESSURE: 52 MMHG | WEIGHT: 218.7 LBS | RESPIRATION RATE: 20 BRPM | HEIGHT: 61 IN | OXYGEN SATURATION: 94 % | BODY MASS INDEX: 41.29 KG/M2 | TEMPERATURE: 98 F | HEART RATE: 67 BPM

## 2019-12-31 DIAGNOSIS — F17.200 SMOKER: ICD-10-CM

## 2019-12-31 DIAGNOSIS — K35.80 ACUTE APPENDICITIS: Primary | ICD-10-CM

## 2019-12-31 LAB
ALBUMIN SERPL BCP-MCNC: 3.9 G/DL (ref 3.5–5)
ALP SERPL-CCNC: 54 U/L (ref 46–116)
ALT SERPL W P-5'-P-CCNC: 35 U/L (ref 12–78)
ANION GAP SERPL CALCULATED.3IONS-SCNC: 14 MMOL/L (ref 4–13)
AST SERPL W P-5'-P-CCNC: 32 U/L (ref 5–45)
BASOPHILS # BLD AUTO: 0.05 THOUSANDS/ΜL (ref 0–0.1)
BASOPHILS NFR BLD AUTO: 0 % (ref 0–1)
BILIRUB SERPL-MCNC: 0.4 MG/DL (ref 0.2–1)
BILIRUB UR QL STRIP: NEGATIVE
BUN SERPL-MCNC: 12 MG/DL (ref 5–25)
CALCIUM SERPL-MCNC: 9 MG/DL (ref 8.3–10.1)
CHLORIDE SERPL-SCNC: 103 MMOL/L (ref 100–108)
CLARITY UR: CLEAR
CO2 SERPL-SCNC: 21 MMOL/L (ref 21–32)
COLOR UR: YELLOW
CREAT SERPL-MCNC: 0.71 MG/DL (ref 0.6–1.3)
EOSINOPHIL # BLD AUTO: 0.4 THOUSAND/ΜL (ref 0–0.61)
EOSINOPHIL NFR BLD AUTO: 3 % (ref 0–6)
ERYTHROCYTE [DISTWIDTH] IN BLOOD BY AUTOMATED COUNT: 12.2 % (ref 11.6–15.1)
GFR SERPL CREATININE-BSD FRML MDRD: 111 ML/MIN/1.73SQ M
GLUCOSE SERPL-MCNC: 114 MG/DL (ref 65–140)
GLUCOSE UR STRIP-MCNC: NEGATIVE MG/DL
HCG SERPL QL: NEGATIVE
HCT VFR BLD AUTO: 40.1 % (ref 34.8–46.1)
HGB BLD-MCNC: 13.7 G/DL (ref 11.5–15.4)
HGB UR QL STRIP.AUTO: NEGATIVE
IMM GRANULOCYTES # BLD AUTO: 0.04 THOUSAND/UL (ref 0–0.2)
IMM GRANULOCYTES NFR BLD AUTO: 0 % (ref 0–2)
KETONES UR STRIP-MCNC: NEGATIVE MG/DL
LACTATE SERPL-SCNC: 2.1 MMOL/L (ref 0.5–2)
LEUKOCYTE ESTERASE UR QL STRIP: NEGATIVE
LIPASE SERPL-CCNC: 83 U/L (ref 73–393)
LYMPHOCYTES # BLD AUTO: 1.29 THOUSANDS/ΜL (ref 0.6–4.47)
LYMPHOCYTES NFR BLD AUTO: 9 % (ref 14–44)
MCH RBC QN AUTO: 30.7 PG (ref 26.8–34.3)
MCHC RBC AUTO-ENTMCNC: 34.2 G/DL (ref 31.4–37.4)
MCV RBC AUTO: 90 FL (ref 82–98)
MONOCYTES # BLD AUTO: 0.7 THOUSAND/ΜL (ref 0.17–1.22)
MONOCYTES NFR BLD AUTO: 5 % (ref 4–12)
NEUTROPHILS # BLD AUTO: 11.92 THOUSANDS/ΜL (ref 1.85–7.62)
NEUTS SEG NFR BLD AUTO: 83 % (ref 43–75)
NITRITE UR QL STRIP: NEGATIVE
NRBC BLD AUTO-RTO: 0 /100 WBCS
PH UR STRIP.AUTO: 5 [PH]
PLATELET # BLD AUTO: 119 THOUSANDS/UL (ref 149–390)
PMV BLD AUTO: 11.6 FL (ref 8.9–12.7)
POTASSIUM SERPL-SCNC: 3.8 MMOL/L (ref 3.5–5.3)
PROT SERPL-MCNC: 7.4 G/DL (ref 6.4–8.2)
PROT UR STRIP-MCNC: NEGATIVE MG/DL
RBC # BLD AUTO: 4.46 MILLION/UL (ref 3.81–5.12)
SODIUM SERPL-SCNC: 138 MMOL/L (ref 136–145)
SP GR UR STRIP.AUTO: <=1.005 (ref 1–1.03)
UROBILINOGEN UR QL STRIP.AUTO: 0.2 E.U./DL
WBC # BLD AUTO: 14.4 THOUSAND/UL (ref 4.31–10.16)

## 2019-12-31 PROCEDURE — 83605 ASSAY OF LACTIC ACID: CPT | Performed by: PHYSICIAN ASSISTANT

## 2019-12-31 PROCEDURE — 84703 CHORIONIC GONADOTROPIN ASSAY: CPT | Performed by: PHYSICIAN ASSISTANT

## 2019-12-31 PROCEDURE — 87086 URINE CULTURE/COLONY COUNT: CPT | Performed by: PHYSICIAN ASSISTANT

## 2019-12-31 PROCEDURE — 88304 TISSUE EXAM BY PATHOLOGIST: CPT | Performed by: PATHOLOGY

## 2019-12-31 PROCEDURE — 74177 CT ABD & PELVIS W/CONTRAST: CPT

## 2019-12-31 PROCEDURE — 96361 HYDRATE IV INFUSION ADD-ON: CPT

## 2019-12-31 PROCEDURE — 44970 LAPAROSCOPY APPENDECTOMY: CPT | Performed by: PHYSICIAN ASSISTANT

## 2019-12-31 PROCEDURE — 71046 X-RAY EXAM CHEST 2 VIEWS: CPT

## 2019-12-31 PROCEDURE — 96374 THER/PROPH/DIAG INJ IV PUSH: CPT

## 2019-12-31 PROCEDURE — 80053 COMPREHEN METABOLIC PANEL: CPT | Performed by: PHYSICIAN ASSISTANT

## 2019-12-31 PROCEDURE — 44970 LAPAROSCOPY APPENDECTOMY: CPT | Performed by: SURGERY

## 2019-12-31 PROCEDURE — 36415 COLL VENOUS BLD VENIPUNCTURE: CPT | Performed by: PHYSICIAN ASSISTANT

## 2019-12-31 PROCEDURE — 85025 COMPLETE CBC W/AUTO DIFF WBC: CPT | Performed by: PHYSICIAN ASSISTANT

## 2019-12-31 PROCEDURE — 99285 EMERGENCY DEPT VISIT HI MDM: CPT | Performed by: PHYSICIAN ASSISTANT

## 2019-12-31 PROCEDURE — 83690 ASSAY OF LIPASE: CPT | Performed by: PHYSICIAN ASSISTANT

## 2019-12-31 PROCEDURE — 99285 EMERGENCY DEPT VISIT HI MDM: CPT

## 2019-12-31 PROCEDURE — 99284 EMERGENCY DEPT VISIT MOD MDM: CPT | Performed by: SURGERY

## 2019-12-31 PROCEDURE — 81003 URINALYSIS AUTO W/O SCOPE: CPT | Performed by: PHYSICIAN ASSISTANT

## 2019-12-31 PROCEDURE — 96376 TX/PRO/DX INJ SAME DRUG ADON: CPT

## 2019-12-31 PROCEDURE — 96375 TX/PRO/DX INJ NEW DRUG ADDON: CPT

## 2019-12-31 RX ORDER — MAGNESIUM HYDROXIDE 1200 MG/15ML
LIQUID ORAL AS NEEDED
Status: DISCONTINUED | OUTPATIENT
Start: 2019-12-31 | End: 2019-12-31 | Stop reason: HOSPADM

## 2019-12-31 RX ORDER — FENTANYL CITRATE/PF 50 MCG/ML
50 SYRINGE (ML) INJECTION
Status: DISCONTINUED | OUTPATIENT
Start: 2019-12-31 | End: 2019-12-31 | Stop reason: HOSPADM

## 2019-12-31 RX ORDER — ONDANSETRON 2 MG/ML
4 INJECTION INTRAMUSCULAR; INTRAVENOUS EVERY 6 HOURS PRN
Status: CANCELLED | OUTPATIENT
Start: 2019-12-31

## 2019-12-31 RX ORDER — ACETAMINOPHEN 325 MG/1
650 TABLET ORAL EVERY 6 HOURS PRN
Qty: 30 TABLET | Refills: 0 | Status: SHIPPED | OUTPATIENT
Start: 2019-12-31

## 2019-12-31 RX ORDER — MIDAZOLAM HYDROCHLORIDE 2 MG/2ML
INJECTION, SOLUTION INTRAMUSCULAR; INTRAVENOUS AS NEEDED
Status: DISCONTINUED | OUTPATIENT
Start: 2019-12-31 | End: 2019-12-31 | Stop reason: SURG

## 2019-12-31 RX ORDER — SODIUM CHLORIDE, SODIUM LACTATE, POTASSIUM CHLORIDE, CALCIUM CHLORIDE 600; 310; 30; 20 MG/100ML; MG/100ML; MG/100ML; MG/100ML
125 INJECTION, SOLUTION INTRAVENOUS CONTINUOUS
Status: DISCONTINUED | OUTPATIENT
Start: 2019-12-31 | End: 2019-12-31 | Stop reason: HOSPADM

## 2019-12-31 RX ORDER — ONDANSETRON 2 MG/ML
4 INJECTION INTRAMUSCULAR; INTRAVENOUS EVERY 6 HOURS PRN
Status: DISCONTINUED | OUTPATIENT
Start: 2019-12-31 | End: 2019-12-31 | Stop reason: HOSPADM

## 2019-12-31 RX ORDER — ACETAMINOPHEN 325 MG/1
650 TABLET ORAL EVERY 6 HOURS PRN
Status: DISCONTINUED | OUTPATIENT
Start: 2019-12-31 | End: 2019-12-31 | Stop reason: HOSPADM

## 2019-12-31 RX ORDER — OXYCODONE HYDROCHLORIDE AND ACETAMINOPHEN 5; 325 MG/1; MG/1
1 TABLET ORAL EVERY 4 HOURS PRN
Qty: 18 TABLET | Refills: 0 | Status: SHIPPED | OUTPATIENT
Start: 2019-12-31 | End: 2020-01-03

## 2019-12-31 RX ORDER — HYDROMORPHONE HCL/PF 1 MG/ML
1 SYRINGE (ML) INJECTION ONCE
Status: COMPLETED | OUTPATIENT
Start: 2019-12-31 | End: 2019-12-31

## 2019-12-31 RX ORDER — KETOROLAC TROMETHAMINE 30 MG/ML
INJECTION, SOLUTION INTRAMUSCULAR; INTRAVENOUS AS NEEDED
Status: DISCONTINUED | OUTPATIENT
Start: 2019-12-31 | End: 2019-12-31 | Stop reason: SURG

## 2019-12-31 RX ORDER — ONDANSETRON 2 MG/ML
4 INJECTION INTRAMUSCULAR; INTRAVENOUS ONCE
Status: COMPLETED | OUTPATIENT
Start: 2019-12-31 | End: 2019-12-31

## 2019-12-31 RX ORDER — HEPARIN SODIUM 5000 [USP'U]/ML
5000 INJECTION, SOLUTION INTRAVENOUS; SUBCUTANEOUS EVERY 8 HOURS SCHEDULED
Status: DISCONTINUED | OUTPATIENT
Start: 2019-12-31 | End: 2019-12-31 | Stop reason: HOSPADM

## 2019-12-31 RX ORDER — DOCUSATE SODIUM 100 MG/1
100 CAPSULE, LIQUID FILLED ORAL 2 TIMES DAILY
Qty: 10 CAPSULE | Refills: 0 | Status: SHIPPED | OUTPATIENT
Start: 2019-12-31 | End: 2020-12-16

## 2019-12-31 RX ORDER — FENTANYL CITRATE 50 UG/ML
INJECTION, SOLUTION INTRAMUSCULAR; INTRAVENOUS AS NEEDED
Status: DISCONTINUED | OUTPATIENT
Start: 2019-12-31 | End: 2019-12-31 | Stop reason: SURG

## 2019-12-31 RX ORDER — ALBUTEROL SULFATE 2.5 MG/3ML
2.5 SOLUTION RESPIRATORY (INHALATION) EVERY 4 HOURS PRN
Status: DISCONTINUED | OUTPATIENT
Start: 2019-12-31 | End: 2019-12-31 | Stop reason: HOSPADM

## 2019-12-31 RX ORDER — NEOSTIGMINE METHYLSULFATE 1 MG/ML
INJECTION INTRAVENOUS AS NEEDED
Status: DISCONTINUED | OUTPATIENT
Start: 2019-12-31 | End: 2019-12-31 | Stop reason: SURG

## 2019-12-31 RX ORDER — ONDANSETRON 2 MG/ML
INJECTION INTRAMUSCULAR; INTRAVENOUS
Status: COMPLETED
Start: 2019-12-31 | End: 2019-12-31

## 2019-12-31 RX ORDER — DEXAMETHASONE SODIUM PHOSPHATE 10 MG/ML
INJECTION, SOLUTION INTRAMUSCULAR; INTRAVENOUS AS NEEDED
Status: DISCONTINUED | OUTPATIENT
Start: 2019-12-31 | End: 2019-12-31 | Stop reason: SURG

## 2019-12-31 RX ORDER — IPRATROPIUM BROMIDE AND ALBUTEROL SULFATE 2.5; .5 MG/3ML; MG/3ML
3 SOLUTION RESPIRATORY (INHALATION)
Status: DISCONTINUED | OUTPATIENT
Start: 2019-12-31 | End: 2019-12-31

## 2019-12-31 RX ORDER — GLYCOPYRROLATE 0.2 MG/ML
INJECTION INTRAMUSCULAR; INTRAVENOUS AS NEEDED
Status: DISCONTINUED | OUTPATIENT
Start: 2019-12-31 | End: 2019-12-31 | Stop reason: SURG

## 2019-12-31 RX ORDER — SODIUM CHLORIDE, SODIUM LACTATE, POTASSIUM CHLORIDE, CALCIUM CHLORIDE 600; 310; 30; 20 MG/100ML; MG/100ML; MG/100ML; MG/100ML
50 INJECTION, SOLUTION INTRAVENOUS CONTINUOUS
Status: CANCELLED | OUTPATIENT
Start: 2019-12-31

## 2019-12-31 RX ORDER — DEXMEDETOMIDINE HYDROCHLORIDE 100 UG/ML
INJECTION, SOLUTION INTRAVENOUS AS NEEDED
Status: DISCONTINUED | OUTPATIENT
Start: 2019-12-31 | End: 2019-12-31 | Stop reason: SURG

## 2019-12-31 RX ORDER — HYDROXYCHLOROQUINE SULFATE 200 MG/1
200 TABLET, FILM COATED ORAL 2 TIMES DAILY
Status: DISCONTINUED | OUTPATIENT
Start: 2019-12-31 | End: 2019-12-31 | Stop reason: HOSPADM

## 2019-12-31 RX ORDER — ONDANSETRON 2 MG/ML
4 INJECTION INTRAMUSCULAR; INTRAVENOUS ONCE AS NEEDED
Status: DISCONTINUED | OUTPATIENT
Start: 2019-12-31 | End: 2019-12-31 | Stop reason: HOSPADM

## 2019-12-31 RX ORDER — ROCURONIUM BROMIDE 10 MG/ML
INJECTION, SOLUTION INTRAVENOUS AS NEEDED
Status: DISCONTINUED | OUTPATIENT
Start: 2019-12-31 | End: 2019-12-31 | Stop reason: SURG

## 2019-12-31 RX ORDER — PROPOFOL 10 MG/ML
INJECTION, EMULSION INTRAVENOUS AS NEEDED
Status: DISCONTINUED | OUTPATIENT
Start: 2019-12-31 | End: 2019-12-31 | Stop reason: SURG

## 2019-12-31 RX ORDER — ONDANSETRON 2 MG/ML
INJECTION INTRAMUSCULAR; INTRAVENOUS AS NEEDED
Status: DISCONTINUED | OUTPATIENT
Start: 2019-12-31 | End: 2019-12-31 | Stop reason: SURG

## 2019-12-31 RX ORDER — HYDROMORPHONE HCL/PF 1 MG/ML
0.5 SYRINGE (ML) INJECTION
Status: DISCONTINUED | OUTPATIENT
Start: 2019-12-31 | End: 2019-12-31 | Stop reason: HOSPADM

## 2019-12-31 RX ORDER — DOCUSATE SODIUM 100 MG/1
100 CAPSULE, LIQUID FILLED ORAL 2 TIMES DAILY
Status: CANCELLED | OUTPATIENT
Start: 2019-12-31

## 2019-12-31 RX ORDER — LIDOCAINE HYDROCHLORIDE 20 MG/ML
INJECTION, SOLUTION EPIDURAL; INFILTRATION; INTRACAUDAL; PERINEURAL AS NEEDED
Status: DISCONTINUED | OUTPATIENT
Start: 2019-12-31 | End: 2019-12-31 | Stop reason: SURG

## 2019-12-31 RX ORDER — HYDROMORPHONE HCL/PF 1 MG/ML
0.5 SYRINGE (ML) INJECTION ONCE
Status: COMPLETED | OUTPATIENT
Start: 2019-12-31 | End: 2019-12-31

## 2019-12-31 RX ADMIN — DEXMEDETOMIDINE HCL 8 MCG: 100 INJECTION INTRAVENOUS at 13:56

## 2019-12-31 RX ADMIN — GLYCOPYRROLATE 0.8 MG: 0.2 INJECTION, SOLUTION INTRAMUSCULAR; INTRAVENOUS at 14:39

## 2019-12-31 RX ADMIN — PIPERACILLIN SODIUM AND TAZOBACTAM SODIUM 3.38 G: 36; 4.5 INJECTION, POWDER, FOR SOLUTION INTRAVENOUS at 09:10

## 2019-12-31 RX ADMIN — ONDANSETRON 4 MG: 2 INJECTION INTRAMUSCULAR; INTRAVENOUS at 02:05

## 2019-12-31 RX ADMIN — LIDOCAINE HYDROCHLORIDE 100 MG: 20 INJECTION, SOLUTION EPIDURAL; INFILTRATION; INTRACAUDAL; PERINEURAL at 13:39

## 2019-12-31 RX ADMIN — MIDAZOLAM HYDROCHLORIDE 2 MG: 1 INJECTION, SOLUTION INTRAMUSCULAR; INTRAVENOUS at 13:34

## 2019-12-31 RX ADMIN — HYDROMORPHONE HYDROCHLORIDE 0.5 MG: 1 INJECTION, SOLUTION INTRAMUSCULAR; INTRAVENOUS; SUBCUTANEOUS at 02:55

## 2019-12-31 RX ADMIN — DEXAMETHASONE SODIUM PHOSPHATE 4 MG: 10 INJECTION, SOLUTION INTRAMUSCULAR; INTRAVENOUS at 13:39

## 2019-12-31 RX ADMIN — ROCURONIUM BROMIDE 10 MG: 10 SOLUTION INTRAVENOUS at 14:08

## 2019-12-31 RX ADMIN — PROPOFOL 100 MG: 10 INJECTION, EMULSION INTRAVENOUS at 14:12

## 2019-12-31 RX ADMIN — PROPOFOL 200 MG: 10 INJECTION, EMULSION INTRAVENOUS at 13:39

## 2019-12-31 RX ADMIN — KETOROLAC TROMETHAMINE 15 MG: 30 INJECTION, SOLUTION INTRAMUSCULAR at 13:39

## 2019-12-31 RX ADMIN — PIPERACILLIN SODIUM AND TAZOBACTAM SODIUM 3.38 G: 36; 4.5 INJECTION, POWDER, FOR SOLUTION INTRAVENOUS at 03:07

## 2019-12-31 RX ADMIN — ONDANSETRON 4 MG: 2 INJECTION INTRAMUSCULAR; INTRAVENOUS at 07:13

## 2019-12-31 RX ADMIN — PIPERACILLIN SODIUM AND TAZOBACTAM SODIUM 3.38 G: 36; 4.5 INJECTION, POWDER, FOR SOLUTION INTRAVENOUS at 13:32

## 2019-12-31 RX ADMIN — MORPHINE SULFATE 2 MG: 2 INJECTION, SOLUTION INTRAMUSCULAR; INTRAVENOUS at 07:32

## 2019-12-31 RX ADMIN — DEXMEDETOMIDINE HCL 12 MCG: 100 INJECTION INTRAVENOUS at 13:39

## 2019-12-31 RX ADMIN — ONDANSETRON 4 MG: 2 INJECTION INTRAMUSCULAR; INTRAVENOUS at 13:39

## 2019-12-31 RX ADMIN — NEOSTIGMINE METHYLSULFATE 4 MG: 1 INJECTION, SOLUTION INTRAVENOUS at 14:39

## 2019-12-31 RX ADMIN — SODIUM CHLORIDE 1000 ML: 0.9 INJECTION, SOLUTION INTRAVENOUS at 00:59

## 2019-12-31 RX ADMIN — IPRATROPIUM BROMIDE AND ALBUTEROL SULFATE 3 ML: 2.5; .5 SOLUTION RESPIRATORY (INHALATION) at 07:19

## 2019-12-31 RX ADMIN — ROCURONIUM BROMIDE 40 MG: 10 SOLUTION INTRAVENOUS at 13:39

## 2019-12-31 RX ADMIN — ONDANSETRON 4 MG: 2 INJECTION INTRAMUSCULAR; INTRAVENOUS at 00:54

## 2019-12-31 RX ADMIN — SODIUM CHLORIDE, SODIUM LACTATE, POTASSIUM CHLORIDE, AND CALCIUM CHLORIDE 125 ML/HR: .6; .31; .03; .02 INJECTION, SOLUTION INTRAVENOUS at 02:58

## 2019-12-31 RX ADMIN — FENTANYL CITRATE 100 MCG: 50 INJECTION, SOLUTION INTRAMUSCULAR; INTRAVENOUS at 13:39

## 2019-12-31 RX ADMIN — HYDROMORPHONE HYDROCHLORIDE 1 MG: 1 INJECTION, SOLUTION INTRAMUSCULAR; INTRAVENOUS; SUBCUTANEOUS at 00:54

## 2019-12-31 RX ADMIN — IOHEXOL 100 ML: 350 INJECTION, SOLUTION INTRAVENOUS at 01:49

## 2019-12-31 RX ADMIN — MORPHINE SULFATE 2 MG: 2 INJECTION, SOLUTION INTRAMUSCULAR; INTRAVENOUS at 11:06

## 2019-12-31 RX ADMIN — FENTANYL CITRATE 50 MCG: 50 INJECTION, SOLUTION INTRAMUSCULAR; INTRAVENOUS at 15:08

## 2019-12-31 NOTE — RESPIRATORY THERAPY NOTE
RT Protocol Note  Germania Almanza 27 y o  female MRN: [id number]  Unit/Bed#: FT 04 Encounter: [id number]    Assessment    Active Problems:    Acute appendicitis      Home Pulmonary Medications:  imhalers/nebulizers       Past Medical History:   Diagnosis Date    APS [facility]autoimmune polyglandular syndrome) ([facility] [address] )     Fibromyalgia      Social History     Socioeconomic History    Marital status: /Civil Union     Spouse name: None    Number of children: None    Years of education: None    Highest education level: None   Occupational History    None   Social Needs    Financial resource strain: None    Food insecurity:     Worry: None     Inability: None    Transportation needs:     Medical: None     Non-medical: None   Tobacco Use    Smoking status: Current Every Day Smoker     Packs/day: 0 50    Smokeless tobacco: Never Used   Substance and Sexual Activity    Alcohol use: Yes     Comment: occasional    Drug use: No    Sexual activity: Yes     Partners: Male   Lifestyle    Physical activity:     Days per week: None     Minutes per session: None    Stress: None   Relationships    Social connections:     Talks on phone: None     Gets together: None     Attends [employer] service: None     Active member of club or organization: None     Attends meetings of clubs or organizations: None     Relationship status: None    Intimate partner violence:     Fear of current or ex partner: None     Emotionally abused: None     Physically abused: None     Forced sexual activity: None   Other Topics Concern    None   Social History Narrative    None       Subjective    Subjective Data: awake with noted pain and discomfort due to appendicitis    Objective    Physical Exam:   Assessment Type: Assess only  General Appearance: Alert, Awake  Respiratory Pattern: Normal, Shallow, Spontaneous  Chest Assessment: Chest expansion symmetrical  Cough: None  O2 Device: room air    Vitals:  Blood pressure 120/78, pulse 78, temperature 98 2 °F (36 8 °C), temperature source Oral, resp  rate 17, height 5' 1" (1 549 m), weight 99 2 kg (218 lb 11 1 oz), last menstrual period 05/01/2019, SpO2 98 %, unknown if currently breastfeeding  Imaging and other studies: I have personally reviewed pertinent reports        O2 Device: room air     Plan    Respiratory Plan: Home Bronchodilator Patient pathway        Resp Comments: respiratory protocol completed at [facility] time tiffany states she takes treatments at home as needed and does not need to have them on a routine basis at this time but requests treatment PRN at this time

## 2019-12-31 NOTE — ANESTHESIA POSTPROCEDURE EVALUATION
Post-Op Assessment Note    CV Status:  Stable  Pain Score: 0    Pain management: adequate     Mental Status:  Somnolent and arousable   Hydration Status:  Euvolemic   PONV Controlled:  Controlled   Airway Patency:  Patent   Post Op Vitals Reviewed: Yes      Staff: CRNA           /58 (12/31/19 1450)    Temp 98 1 °F (36 7 °C) (12/31/19 1450)    Pulse 76 (12/31/19 1450)   Resp (!) 23 (12/31/19 1450)    SpO2 97 % (12/31/19 1450)

## 2019-12-31 NOTE — ANESTHESIA PREPROCEDURE EVALUATION
Review of Systems/Medical History  Patient summary reviewed  Chart reviewed      Cardiovascular  Negative cardio ROS    Pulmonary  Smoker cigarette smoker  , Tobacco cessation counseling given Cumulative Pack Years: 22,        GI/Hepatic  Negative GI/hepatic ROS          Negative  ROS        Endo/Other    Obesity  morbid obesity   GYN       Hematology  Negative hematology ROS      Musculoskeletal  Negative musculoskeletal ROS        Neurology  Negative neurology ROS      Psychology   Negative psychology ROS              Physical Exam    Airway    Mallampati score: III  TM Distance: <3 FB  Neck ROM: limited     Dental   No notable dental hx     Cardiovascular  Comment: Negative ROS, Rhythm: regular, Rate: normal,     Pulmonary  Breath sounds clear to auscultation, Decreased breath sounds,     Other Findings        Anesthesia Plan  ASA Score- 3     Anesthesia Type- general with ASA Monitors  Additional Monitors:   Airway Plan: ETT  Plan Factors-    Induction- intravenous  Postoperative Plan- Plan for postoperative opioid use  Informed Consent- Anesthetic plan and risks discussed with patient  I personally reviewed this patient with the CRNA  Discussed and agreed on the Anesthesia Plan with the CRNA  Jerry Brown

## 2019-12-31 NOTE — PERIOPERATIVE NURSING NOTE
Per Dr Mary Braga,  Given next dose of IV 3 375 Zosyn on call to OR instead of the original scheduled time

## 2019-12-31 NOTE — ED NOTES
Callbell within reach  Bed in low position  Siderails up    [address] elevated      Kit RIP Mao  12/31/19 1602
Patient transported to [address]  12/31/19 4343
Patient transported to [address] Montero RN  12/31/19 0140
Respiratory at bedside     Michell Hollins RN  12/31/19 0359
Surgery PA-C at bedside     Blake Malone RN  12/31/19 0797
Samples Given: Patient was given samples of Triamcinolone ointment to apply twice a day to affected area on left arm for two weeks. If lesion does not resolve then patient is to call us and make an appointment to reevaluate.
Detail Level: Zone

## 2019-12-31 NOTE — DISCHARGE INSTRUCTIONS
Call the Surgical office to make appointment for 2 weeks   Meds:  If you do not need strong pain medicine you may take Tylenol  Do not take acetaminophen (Tylenol) WITH  pain meds that contain acetaminophen  Take one or the other  Do not exceed more than 4000 mg of acetaminophen in 24 hours  or 3000 mg if you have liver disease  No driving until seen in the office  No driving while taking pain meds  No heavy lifting or strenuous exercise until you are cleared in the surgery office   You may shower and get incisions wet,rinse, and pat dry  If you have steri-strips you may take them off in 5 days   If you have adhesive wound closure, do not rub or pick off  Call the office if you have increased pain not relieved with pain medicine  Call the office if you have a fever,redness, the wound opens up, you have pus draining from your incision  Colace 100 mg daily to avoid constipation  Appendicitis   WHAT YOU SHOULD KNOW:   Appendicitis is inflammation of the appendix  The appendix is a small pouch that is attached to the large intestine on the lower right side of the abdomen  AFTER YOU LEAVE:   Medicines:   · Pain medicine: You may be given medicine to take away or decrease pain  Do not wait until the pain is severe before you take your medicine  · Antibiotics: This medicine is given to fight or prevent an infection caused by bacteria  Always take your antibiotics exactly as ordered by your healthcare provider  Do not stop taking your medicine unless directed by your healthcare provider  Never save antibiotics or take leftover antibiotics that were given to you for another illness  · Take your medicine as directed  Call your healthcare provider if you think your medicine is not helping or if you have side effects  Tell him if you are allergic to any medicine  Keep a list of the medicines, vitamins, and herbs you take  Include the amounts, and when and why you take them   Bring the list or the pill bottles to follow-up visits  Carry your medicine list with you in case of an emergency  Follow up with your healthcare provider in 2 weeks:  Write down your questions so you remember to ask them during your visits  Activity:  You may need to limit activity for 4 to 6 weeks after surgery  Ask your healthcare provider what activities you can do  Contact your healthcare provider if:   · You have abdominal pain that does not go away, even after you take medicine  · You have chills, a cough, or feel weak and achy  · You have trouble having a bowel movement or have diarrhea  · You have questions or concerns about your condition or care  Seek care immediately or call 911 if:   · You have a fever  · You have severe pain in your abdomen  · You are vomiting and cannot keep food down  © 2014 [employer] is for End User's use only and may not be sold, redistributed or otherwise used for commercial purposes  All illustrations and images included in CareNotes® are the copyrighted property of A D [employer] M [employer]  or Taran Butcher  The above information is an  only  It is not intended as medical advice for individual conditions or treatments  Talk to your doctor, nurse or pharmacist before following any medical regimen to see if it is safe and effective for you

## 2019-12-31 NOTE — H&P
GENERAL SURGERY HISTORY AND PHYSICAL      Tristin Allen 27 y o  female MRN: [id number]  Unit/Bed#: FT  Encounter: [id number]      Assessment/Plan   Acute Appendicitis  Leukocytosis 14  Desaturation of oxygen in ED   -NPO  -IVF  -pain control  -VTE with heparin   -O2 NC at 4 liters to be titrated down   -pain control  -nebulizer prn   -zosyn has been started in the ED       APS, Antiphosphlolipid Syndrome during pregnancy, multiple micarriages was on Heparin for full term pregnancies  and borderline antibody outside of pregnancy  No on AC  No h/o DVT/PE  Fibromyalgia   H/o multiple C-Sections  H/o ruptures uterus at term pregnancy  H/o intra-abdominal adhesion, RODOLFO during  closure  Obesity  Malformation of Uterus  Smoker  Being worked up for Sjogren on Plaquenil      Chief Complaint I had pain across the top of my abdomen around to my back then moved into my right low side x 1 d  HPI: Tristin Allen is a 27y o  year old female  PHM  Antiphospholipid syndrome during pregnancy/heparin, borderline antibodies outside of pregnancy , not on AC, no h/o DVT/PE,  fibromyalgia, multiple C-Sections  H o uterine rupture at term, h/o RODOLFO with , malformation of uterus  who presents with abdominal pain that started yesterday  She had violent vomiting that was constant, every 6-7 minutes for the last 6 hours before I came in   The pain was across the top of my abdomen and it went around my right back and side  It then went lower across the bottom of my abdomen more so in the right side  I had diarrhea x 1  Pt denies f/c/constipation  In the Ed the patient's oxygen desaturated as low as 86 after narcotics were given  She was placed on O2 NC and is currently 100% on 4 L  No wheezing but she felt tight and received updraft nebulizer  She states that she had constant violent vomiting  She thinks it was possible that she could have aspirated  She coughed green/yellow sputum here in the ED   No splinting  On exam she has faint bibasilar rales  No wheezing  Abdominal is significantly tender throughout with light palpation  No rebound or guarding  CT   IMPRESSION:     Mildly dilated thickened appendix with mild surrounding inflammatory stranding most compatible with acute appendicitis  There is no free air or free fluid to suggest a perforation  Surgical consultation is advised          Historical Information   Past Medical History:   Diagnosis Date    APS (autoimmune polyglandular syndrome) ([address] )     Fibromyalgia      Past Surgical History:   Procedure Laterality Date     SECTION       Social History   Social History     Substance and Sexual Activity   Alcohol Use Yes    Comment: occasional     Social History     Substance and Sexual Activity   Drug Use No     Social History     Tobacco Use   Smoking Status Current Every Day Smoker    Packs/day: 0 50   Smokeless Tobacco Never Used     Family History: no pertinent family history  Allergies   Allergen Reactions    Valacyclovir Itching and Swelling     Meds/Allergies   current meds:   Current Facility-Administered Medications   Medication Dose Route Frequency    [MAR Hold] acetaminophen (TYLENOL) tablet 650 mg  650 mg Oral Q6H PRN    [MAR Hold] albuterol inhalation solution 2 5 mg  2 5 mg Nebulization Q4H PRN    [MAR Hold] heparin (porcine) subcutaneous injection 5,000 Units  5,000 Units Subcutaneous Q8H [address]    lactated ringers infusion  125 mL/hr Intravenous Continuous    [MAR Hold] morphine injection 2 mg  2 mg Intravenous Q4H PRN    [MAR Hold] nicotine (NICODERM CQ) 7 mg/24hr TD 24 hr patch 1 patch  1 patch Transdermal Daily    [MAR Hold] ondansetron (ZOFRAN) injection 4 mg  4 mg Intravenous Q6H PRN    [MAR Hold] piperacillin-tazobactam (ZOSYN) 3 375 g in sodium chloride 0 9 % 50 mL IVPB  3 375 g Intravenous Q6H         Objective   Vitals: Blood pressure 120/78, pulse 78, temperature 98 2 °F (36 8 °C), temperature source Oral, resp  rate 17, height 5' 1" (1 549 m), weight 99 2 kg (218 lb 11 1 oz), last menstrual period 05/01/2019, SpO2 98 %, unknown if currently breastfeeding  ,Body mass index is 41 32 kg/m²  Intake/Output Summary (Last 24 hours) at 12/31/2019 0953  Last data filed at 12/31/2019 6627  Gross per 24 hour   Intake 1050 ml   Output --   Net 1050 ml     @[facility]    @ROS:  12 set ROS reviewed and negative except for:   Pain in my abdomen since yesterday  Violent vomiting  Diarrhea        Physical Exam:    General appearance: alert, appears stated age and cooperative  HEENT: PERRLA, EOMI, sclera clear, anicterus, oral mucosa is  dry  Back: no tenderness,deformity,   Lungs:clear throughout  Heart[de-identified] RRR, S1, S2 normal, no murmur  Abdomen: soft, significant tenderness throughout with light palpation, no rebound or guarding  NBS, no hernia  Low transverse incision is without hernia  Healed well  Extremities: FROM no joint deformities, motor,sensory intact,pedal edema none  No calf tenderness  Skin: no rashes or lesions   Neurologic: CN II-XII grossly intact, no tremor, affect appropriate    Lab Results:   Coags: No results found for: PT, PTT, INR, CBC with diff:   Lab Results   Component Value Date    WBC 14 40 (H) 12/31/2019    HGB 13 7 12/31/2019    HCT 40 1 12/31/2019    MCV 90 12/31/2019     (L) 12/31/2019    MCH 30 7 12/31/2019    MCHC 34 2 12/31/2019    RDW 12 2 12/31/2019    MPV 11 6 12/31/2019    NRBC 0 12/31/2019   , BMP/CMP:   Lab Results   Component Value Date    SODIUM 138 12/31/2019    K 3 8 12/31/2019     12/31/2019    CO2 21 12/31/2019    BUN 12 12/31/2019    CREATININE 0 71 12/31/2019    CALCIUM 9 0 12/31/2019    AST 32 12/31/2019    ALT 35 12/31/2019    ALKPHOS 54 12/31/2019    EGFR 111 12/31/2019     Imaging Studies: Xr Chest Pa & Lateral    Result Date: 12/31/2019  Impression: No acute cardiopulmonary disease   Workstation performed: [id number]     Ct Abdomen Pelvis W Contrast    Result Date: 12/31/2019  Impression: Mildly dilated thickened appendix with mild surrounding inflammatory stranding most compatible with acute appendicitis  There is no free air or free fluid to suggest a perforation  Surgical consultation is advised    I personally discussed this study with Cullen Robbins on 12/31/2019 at 2:03 AM  Workstation performed: [id number]       VTE Prophylaxis: Sequential compression device (Venodyne)  and Heparin     Code Status: Level 1 - Full Code  Advance Directive and Living Will:      Power of :    POLST:      Sudarshan Moore PA-C  12/31/2019

## 2019-12-31 NOTE — ED PROVIDER NOTES
History  Chief Complaint   Patient presents with    Abdominal Pain     Pt c/o bilateral upper quadrant pain x 1 day  Pt states, "I have been throwing up for the past 6 hours straight, I can't keep anything down " Pt denies diarrhea at this time  Pt denies CP or SOB  Patient is a 76-year-old female presents emergency department complaints of abdominal pain, nausea, vomiting x1 day  Patient states that the pain has gotten progressively worse  She denies any in diarrhea, fever, chills  Patient denies chest pain or shortness of breath  Prior to Admission Medications   Prescriptions Last Dose Informant Patient Reported? Taking? Norethindrone (JENCYCLA PO)  Self Yes Yes   Sig: Take by mouth   TiZANidine (ZANAFLEX) 2 MG capsule Not Taking at Unknown time  Yes No   albuterol (2 5 mg/3 mL) 0 083 % nebulizer solution Not Taking at Unknown time  No No   Sig: Take 1 vial (2 5 mg total) by nebulization every 4 (four) hours as needed for wheezing or shortness of breath   Patient not taking: Reported on 2019   hydroxychloroquine (PLAQUENIL) 200 mg tablet   Yes Yes   Sig: Take 200 mg by mouth 2 (two) times a day   predniSONE 20 mg tablet Not Taking at Unknown time  No No   Sig: Take 3 tablets PO daily for 2 days, then 2 tablets daily x 2 days, then 1 tablet daily x 2 days   Patient not taking: Reported on 2019      Facility-Administered Medications: None       Past Medical History:   Diagnosis Date    APS (autoimmune polyglandular syndrome) (HCC)     Fibromyalgia        Past Surgical History:   Procedure Laterality Date     SECTION         History reviewed  No pertinent family history  I have reviewed and agree with the history as documented      Social History     Tobacco Use    Smoking status: Current Every Day Smoker     Packs/day: 0 50    Smokeless tobacco: Never Used   Substance Use Topics    Alcohol use: Yes     Comment: occasional    Drug use: No        Review of Systems Constitutional: Negative for fever  Respiratory: Negative for shortness of breath  Cardiovascular: Negative for chest pain  Gastrointestinal: Positive for abdominal pain, nausea and vomiting  All other systems reviewed and are negative  Physical Exam  Physical Exam   Constitutional: She is oriented to person, place, and time  She appears well-developed and well-nourished  HENT:   Head: Normocephalic and atraumatic  Mouth/Throat: Oropharynx is clear and moist    Eyes: Pupils are equal, round, and reactive to light  EOM are normal    Cardiovascular: Normal rate, regular rhythm and normal heart sounds  Pulmonary/Chest: Effort normal and breath sounds normal    Abdominal: Soft  Normal appearance and bowel sounds are normal  There is tenderness in the right upper quadrant, right lower quadrant and epigastric area  There is rebound, guarding and tenderness at McBurney's point  Neurological: She is alert and oriented to person, place, and time  Skin: Skin is warm  Capillary refill takes less than 2 seconds  Psychiatric: She has a normal mood and affect  Her behavior is normal    Vitals reviewed        Vital Signs  ED Triage Vitals [12/31/19 0010]   Temperature Pulse Respirations Blood Pressure SpO2   98 2 °F (36 8 °C) (!) 49 22 127/74 97 %      Temp Source Heart Rate Source Patient Position - Orthostatic VS BP Location FiO2 (%)   Oral Monitor Sitting Left arm --      Pain Score       Worst Possible Pain           Vitals:    12/31/19 0010 12/31/19 0130 12/31/19 0215   BP: 127/74 128/78 128/73   Pulse: (!) 49 (!) 53 70   Patient Position - Orthostatic VS: Sitting Lying Lying         Visual Acuity      ED Medications  Medications   lactated ringers infusion (125 mL/hr Intravenous New Bag 12/31/19 0258)   piperacillin-tazobactam (ZOSYN) 3 375 g in sodium chloride 0 9 % 50 mL IVPB (has no administration in time range)   ondansetron (ZOFRAN) injection 4 mg (has no administration in time range) nicotine (NICODERM CQ) 7 mg/24hr TD 24 hr patch 1 patch (has no administration in time range)   heparin (porcine) subcutaneous injection 5,000 Units (5,000 Units Subcutaneous Not Given 12/31/19 0501)   morphine injection 2 mg (has no administration in time range)   acetaminophen (TYLENOL) tablet 650 mg (has no administration in time range)   ondansetron (ZOFRAN) injection 4 mg (4 mg Intravenous Given 12/31/19 0054)   HYDROmorphone (DILAUDID) injection 1 mg (1 mg Intravenous Given 12/31/19 0054)   sodium chloride 0 9 % bolus 1,000 mL (0 mL Intravenous Stopped 12/31/19 0259)   iohexol (OMNIPAQUE) 350 MG/ML injection (MULTI-DOSE) 100 mL (100 mL Intravenous Given 12/31/19 0149)   ondansetron (ZOFRAN) injection 4 mg (4 mg Intravenous Given 12/31/19 0205)   piperacillin-tazobactam (ZOSYN) 3 375 g in sodium chloride 0 9 % 50 mL IVPB (0 g Intravenous Stopped 12/31/19 0337)   HYDROmorphone (DILAUDID) injection 0 5 mg (0 5 mg Intravenous Given 12/31/19 0255)       Diagnostic Studies  Results Reviewed     Procedure Component Value Units Date/Time    UA w Reflex to Microscopic w Reflex to Culture [[id number]] Collected:  12/31/19 0258    Lab Status:  Final result Specimen:  Urine, Clean Catch Updated:  12/31/19 0309     Color, UA Yellow     Clarity, UA Clear     Specific Gravity, UA <=1 005     pH, UA 5 0     Leukocytes, UA Negative     Nitrite, UA Negative     Protein, UA Negative mg/dl      Glucose, UA Negative mg/dl      Ketones, UA Negative mg/dl      Urobilinogen, UA 0 2 E U /dl      Bilirubin, UA Negative     Blood, UA Negative     URINE COMMENT --    Urine culture [[id number]] Collected:  12/31/19 0258    Lab Status:   In process Specimen:  Urine, Clean Catch Updated:  12/31/19 0309    Platelet count [[id number]]     Lab Status:  No result Specimen:  Blood     Lactic acid, plasma [[id number]]  (Abnormal) Collected:  12/31/19 0059    Lab Status:  Final result Specimen:  Blood from Arm, Left Updated:  12/31/19 0134     LACTIC ACID 2 1 mmol/L     Narrative:       Result may be elevated if tourniquet was used during collection      Comprehensive metabolic panel [[id number]]  (Abnormal) Collected:  12/31/19 0059    Lab Status:  Final result Specimen:  Blood from Arm, Left Updated:  12/31/19 0133     Sodium 138 mmol/L      Potassium 3 8 mmol/L      Chloride 103 mmol/L      CO2 21 mmol/L      ANION GAP 14 mmol/L      BUN 12 mg/dL      Creatinine 0 71 mg/dL      Glucose 114 mg/dL      Calcium 9 0 mg/dL      AST 32 U/L      ALT 35 U/L      Alkaline Phosphatase 54 U/L      Total Protein 7 4 g/dL      Albumin 3 9 g/dL      Total Bilirubin 0 40 mg/dL      eGFR 111 ml/min/1 73sq m     Narrative:       [place] guidelines for Chronic Kidney Disease (CKD):     Stage 1 with normal or high GFR (GFR > 90 mL/min/1 73 square meters)    Stage 2 Mild CKD (GFR = 60-89 mL/min/1 73 square meters)    Stage 3A Moderate CKD (GFR = 45-59 mL/min/1 73 square meters)    Stage 3B Moderate CKD (GFR = 30-44 mL/min/1 73 square meters)    Stage 4 Severe CKD (GFR = 15-29 mL/min/1 73 square meters)    Stage 5 End Stage CKD (GFR <15 mL/min/1 73 square meters)  Note: GFR calculation is accurate only with a steady state creatinine    Lipase [[id number]]  (Normal) Collected:  12/31/19 0059    Lab Status:  Final result Specimen:  Blood from Arm, Left Updated:  12/31/19 0133     Lipase 83 u/L     hCG, qualitative pregnancy [[id number]]  (Normal) Collected:  12/31/19 0059    Lab Status:  Final result Specimen:  Blood from Arm, Left Updated:  12/31/19 0133     Preg, Serum Negative    CBC and differential [[id number]]  (Abnormal) Collected:  12/31/19 0059    Lab Status:  Final result Specimen:  Blood from Arm, Left Updated:  12/31/19 0115     WBC 14 40 Thousand/uL      RBC 4 46 Million/uL      Hemoglobin 13 7 g/dL      Hematocrit 40 1 %      MCV 90 fL      MCH 30 7 pg      MCHC 34 2 g/dL      RDW 12 2 %      MPV 11 6 fL      Platelets 193 Thousands/uL nRBC 0 /100 WBCs      Neutrophils Relative 83 %      Immat GRANS % 0 %      Lymphocytes Relative 9 %      Monocytes Relative 5 %      Eosinophils Relative 3 %      Basophils Relative 0 %      Neutrophils Absolute 11 92 Thousands/µL      Immature Grans Absolute 0 04 Thousand/uL      Lymphocytes Absolute 1 29 Thousands/µL      Monocytes Absolute 0 70 Thousand/µL      Eosinophils Absolute 0 40 Thousand/µL      Basophils Absolute 0 05 Thousands/µL                  CT abdomen pelvis w contrast   Final Result by Haleigh Bermudez MD (12/31 0215)      Mildly dilated thickened appendix with mild surrounding inflammatory stranding most compatible with acute appendicitis  There is no free air or free fluid to suggest a perforation  Surgical consultation is advised  I personally discussed this study with Yanique Bonner on 12/31/2019 at 2:03 AM                Workstation performed: [id number]                    Procedures  Procedures         ED Course                               MDM  Number of Diagnoses or Management Options  Acute appendicitis:   Diagnosis management comments: Patient is a 69-year-old female presents emergency department with complaints of abdominal pain, nausea, vomiting that began today  Lab evaluation was performed is significant for elevated white blood cell count  CT scan abdomen pelvis was also reviewed and does show evidence of acute appendicitis  Findings were discussed with Dr Simran Macario from General surgery  Patient will be admitted to the hospital for surgical evaluation and treatment  Patient started on IV Zosyn         Amount and/or Complexity of Data Reviewed  Clinical lab tests: ordered and reviewed  Tests in the radiology section of CPT®: ordered and reviewed  Review and summarize past medical records: yes  Discuss the patient with other providers: yes    Risk of Complications, Morbidity, and/or Mortality  Presenting problems: moderate  Diagnostic procedures: moderate  Management options: moderate    Patient Progress  Patient progress: stable        Disposition  Final diagnoses:   Acute appendicitis     Time reflects when diagnosis was documented in both MDM as applicable and the Disposition within this note     Time User Action Codes Description Comment    12/31/2019  2:14 AM Yamilka Hale Add [K35 80] Acute appendicitis       ED Disposition     ED Disposition Condition Date/Time Comment    Admit Stable Tue Dec 31, 2019  2:14 AM Case was discussed with Dr Shady Negron and the patient's admission status was agreed to be Admission Status: inpatient status to the service of Dr Shady Negron   Follow-up Information    None         Patient's Medications   Discharge Prescriptions    No medications on file     No discharge procedures on file      ED Provider  Electronically Signed by           Eloina Pereira PA-C  12/31/19 6367

## 2020-01-01 LAB — BACTERIA UR CULT: NORMAL

## 2020-01-02 ENCOUNTER — TRANSITIONAL CARE MANAGEMENT (OUTPATIENT)
Dept: FAMILY MEDICINE CLINIC | Facility: CLINIC | Age: 35
End: 2020-01-02

## 2020-01-07 NOTE — OP NOTE
OPERATIVE REPORT  PATIENT NAME: Erlinda Ernandez    :  1985  MRN: [id number]  Pt Location: [facility] OR ROOM 02    SURGERY DATE: 2019    Surgeon(s) and Role:     * Rosangela Lai MD - Primary     * Martin Gil PA-C - Assisting    Preop Diagnosis:  Acute appendicitis [K35 80]    Post-Op Diagnosis Codes:     * Acute appendicitis [K35 80]    Procedure(s) (LRB):  APPENDECTOMY LAPAROSCOPIC (N/A)    Specimen(s):  ID Type Source Tests Collected by Time Destination   1 : appendix Tissue Appendix TISSUE EXAM Rosangela Lai MD 2019  2:05 PM        Estimated Blood Loss:   Minimal    Drains:  [REMOVED] NG/OG/Enteral Tube Orogastric 18 Fr Center mouth (Removed)   Number of days: 0       Anesthesia Type:   General    Operative Indications:  Acute appendicitis [K35 80]       Operative Findings:  Retroperitoneal/retrocecal appendix    Complications:   None    Procedure and Technique:  The patient was seen again in the Holding Room  The risks, benefits, complications, treatment options, and expected outcomes were discussed with the patient and family  There was concurrence with the proposed plan and informed consent was obtained  The site of surgery was properly noted/marked  The patient was taken to Operating Room, identified and the procedure verified as Appendectomy and incision and drainage  A Time Out was held and the above information confirmed  The patient was placed in the supine position and general anesthesia was induced, along with placement of orogastric tube, Venodyne boots  r  The abdomen was prepped and draped in a sterile fashion  A one centimeter infraumbilical incision was made and the umbilical stalk was grasped and the fascia incised  A 0 vicryl UR6 was used to place the [id number] port  The pneumoperitoneum was then established to steady pressure of 12 mmHg    Additional 5 mm cannulas then placed in the left lower quadrant of the abdomen and half way between the umbilicus and pubic symphysis under direct vision  The patient was placed in Trendelenburg and left lateral decubitus position  The small intestines were retracted in the cephalad and left lateral direction away from the pelvis and right lower quadrant  The patient was found to have an enlarged and inflamed appendix that was extending retrocecal and toward the liver  There was no evidence of perforation  The appendix was carefully dissected  A window was made in the mesoappendix at the base of the appendix  A vascular load endo-REGAN was fired across the mesoappendix  The appendix was divided at its base using an endo-REGAN stapler  No appendiceal stump was left in place  There was no evidence of bleeding, leakage, or complication after division of the appendix  Irrigation was also performed and irrigate suctioned from the abdomen as well  The umbilical port site was closed using an additional 0 vicryl suture in a figure eight fashion at the level of the fascia  The trocar site skin wounds were closed using 4-0 monocryl and covered with histoacryl  Instrument, sponge, and needle counts were correct at the conclusion of the case  I was present for the entire procedure  No resident was available  A PA assistance was required for assistance       Patient Disposition:  PACU  and extubated and stable    SIGNATURE: Padmini Lantigua MD  DATE: January 6, 2020  TIME: 11:38 PM

## 2020-11-15 ENCOUNTER — NURSE TRIAGE (OUTPATIENT)
Dept: OTHER | Facility: OTHER | Age: 35
End: 2020-11-15

## 2020-11-15 DIAGNOSIS — Z20.828 SARS-ASSOCIATED CORONAVIRUS EXPOSURE: Primary | ICD-10-CM

## 2020-11-16 DIAGNOSIS — Z20.828 SARS-ASSOCIATED CORONAVIRUS EXPOSURE: ICD-10-CM

## 2020-12-16 ENCOUNTER — LAB (OUTPATIENT)
Dept: LAB | Facility: HOSPITAL | Age: 35
End: 2020-12-16
Payer: COMMERCIAL

## 2020-12-16 ENCOUNTER — OFFICE VISIT (OUTPATIENT)
Dept: RHEUMATOLOGY | Facility: CLINIC | Age: 35
End: 2020-12-16
Payer: COMMERCIAL

## 2020-12-16 ENCOUNTER — HOSPITAL ENCOUNTER (OUTPATIENT)
Dept: RADIOLOGY | Facility: HOSPITAL | Age: 35
Discharge: HOME | End: 2020-12-16
Payer: COMMERCIAL

## 2020-12-16 VITALS — WEIGHT: 206 LBS | HEART RATE: 64 BPM | BODY MASS INDEX: 38.89 KG/M2 | HEIGHT: 61 IN

## 2020-12-16 DIAGNOSIS — M54.50 CHRONIC BILATERAL LOW BACK PAIN WITHOUT SCIATICA: ICD-10-CM

## 2020-12-16 DIAGNOSIS — R53.83 OTHER FATIGUE: ICD-10-CM

## 2020-12-16 DIAGNOSIS — M25.50 ARTHRALGIA, UNSPECIFIED JOINT: Primary | ICD-10-CM

## 2020-12-16 DIAGNOSIS — M25.50 POLYARTHRALGIA: ICD-10-CM

## 2020-12-16 DIAGNOSIS — G89.29 CHRONIC BILATERAL LOW BACK PAIN WITHOUT SCIATICA: ICD-10-CM

## 2020-12-16 DIAGNOSIS — M79.7 FIBROMYALGIA: ICD-10-CM

## 2020-12-16 LAB
ALBUMIN SERPL BCP-MCNC: 4.1 G/DL (ref 3.5–5)
ALP SERPL-CCNC: 55 U/L (ref 46–116)
ALT SERPL W P-5'-P-CCNC: 24 U/L (ref 12–78)
ANION GAP SERPL CALCULATED.3IONS-SCNC: 8 MMOL/L (ref 4–13)
AST SERPL W P-5'-P-CCNC: 11 U/L (ref 5–45)
BASOPHILS # BLD AUTO: 0.04 THOUSANDS/ΜL (ref 0–0.1)
BASOPHILS NFR BLD AUTO: 1 % (ref 0–1)
BILIRUB SERPL-MCNC: 0.4 MG/DL (ref 0.2–1)
BUN SERPL-MCNC: 15 MG/DL (ref 5–25)
CALCIUM SERPL-MCNC: 9.3 MG/DL (ref 8.3–10.1)
CHLORIDE SERPL-SCNC: 104 MMOL/L (ref 100–108)
CO2 SERPL-SCNC: 26 MMOL/L (ref 21–32)
CREAT SERPL-MCNC: 0.83 MG/DL (ref 0.6–1.3)
CRP SERPL QL: 7.9 MG/L
EOSINOPHIL # BLD AUTO: 0.86 THOUSAND/ΜL (ref 0–0.61)
EOSINOPHIL NFR BLD AUTO: 12 % (ref 0–6)
ERYTHROCYTE [DISTWIDTH] IN BLOOD BY AUTOMATED COUNT: 12.2 % (ref 11.6–15.1)
ERYTHROCYTE [SEDIMENTATION RATE] IN BLOOD: 11 MM/HOUR (ref 0–19)
GFR SERPL CREATININE-BSD FRML MDRD: 91 ML/MIN/1.73SQ M
GLUCOSE SERPL-MCNC: 81 MG/DL (ref 65–140)
HCT VFR BLD AUTO: 44 % (ref 34.8–46.1)
HGB BLD-MCNC: 15.1 G/DL (ref 11.5–15.4)
IMM GRANULOCYTES # BLD AUTO: 0.03 THOUSAND/UL (ref 0–0.2)
IMM GRANULOCYTES NFR BLD AUTO: 0 % (ref 0–2)
LYMPHOCYTES # BLD AUTO: 2.11 THOUSANDS/ΜL (ref 0.6–4.47)
LYMPHOCYTES NFR BLD AUTO: 30 % (ref 14–44)
MCH RBC QN AUTO: 30.8 PG (ref 26.8–34.3)
MCHC RBC AUTO-ENTMCNC: 34.3 G/DL (ref 31.4–37.4)
MCV RBC AUTO: 90 FL (ref 82–98)
MONOCYTES # BLD AUTO: 0.4 THOUSAND/ΜL (ref 0.17–1.22)
MONOCYTES NFR BLD AUTO: 6 % (ref 4–12)
NEUTROPHILS # BLD AUTO: 3.66 THOUSANDS/ΜL (ref 1.85–7.62)
NEUTS SEG NFR BLD AUTO: 51 % (ref 43–75)
NRBC BLD AUTO-RTO: 0 /100 WBCS
PLATELET # BLD AUTO: 292 THOUSANDS/UL (ref 149–390)
PMV BLD AUTO: 9.1 FL (ref 8.9–12.7)
POTASSIUM SERPL-SCNC: 3.8 MMOL/L (ref 3.5–5.3)
PROT SERPL-MCNC: 7.9 G/DL (ref 6.4–8.2)
RBC # BLD AUTO: 4.9 MILLION/UL (ref 3.81–5.12)
SODIUM SERPL-SCNC: 138 MMOL/L (ref 136–145)
T4 FREE SERPL-MCNC: 1.08 NG/DL (ref 0.76–1.46)
TSH SERPL DL<=0.05 MIU/L-ACNC: 3.94 UIU/ML (ref 0.36–3.74)
WBC # BLD AUTO: 7.1 THOUSAND/UL (ref 4.31–10.16)

## 2020-12-16 PROCEDURE — 80053 COMPREHEN METABOLIC PANEL: CPT | Performed by: INTERNAL MEDICINE

## 2020-12-16 PROCEDURE — 99245 OFF/OP CONSLTJ NEW/EST HI 55: CPT | Performed by: INTERNAL MEDICINE

## 2020-12-16 PROCEDURE — 73130 X-RAY EXAM OF HAND: CPT

## 2020-12-16 PROCEDURE — 72200 X-RAY EXAM SI JOINTS: CPT

## 2020-12-16 PROCEDURE — 86235 NUCLEAR ANTIGEN ANTIBODY: CPT | Performed by: INTERNAL MEDICINE

## 2020-12-16 PROCEDURE — 84439 ASSAY OF FREE THYROXINE: CPT | Performed by: INTERNAL MEDICINE

## 2020-12-16 PROCEDURE — 86430 RHEUMATOID FACTOR TEST QUAL: CPT | Performed by: INTERNAL MEDICINE

## 2020-12-16 PROCEDURE — 84443 ASSAY THYROID STIM HORMONE: CPT | Performed by: INTERNAL MEDICINE

## 2020-12-16 PROCEDURE — 82306 VITAMIN D 25 HYDROXY: CPT | Performed by: INTERNAL MEDICINE

## 2020-12-16 PROCEDURE — 86200 CCP ANTIBODY: CPT | Performed by: INTERNAL MEDICINE

## 2020-12-16 PROCEDURE — 36415 COLL VENOUS BLD VENIPUNCTURE: CPT | Performed by: INTERNAL MEDICINE

## 2020-12-16 PROCEDURE — 86038 ANTINUCLEAR ANTIBODIES: CPT | Performed by: INTERNAL MEDICINE

## 2020-12-16 PROCEDURE — 85025 COMPLETE CBC W/AUTO DIFF WBC: CPT | Performed by: INTERNAL MEDICINE

## 2020-12-16 PROCEDURE — 86140 C-REACTIVE PROTEIN: CPT | Performed by: INTERNAL MEDICINE

## 2020-12-16 PROCEDURE — 85652 RBC SED RATE AUTOMATED: CPT | Performed by: INTERNAL MEDICINE

## 2020-12-16 RX ORDER — HYDROXYCHLOROQUINE SULFATE 200 MG/1
400 TABLET, FILM COATED ORAL
Qty: 60 TABLET | Refills: 1 | Status: SHIPPED | OUTPATIENT
Start: 2020-12-16 | End: 2021-03-25

## 2020-12-17 LAB — 25(OH)D3 SERPL-MCNC: 18.4 NG/ML (ref 30–100)

## 2020-12-18 LAB
ENA SS-A AB SER-ACNC: <0.2 AI (ref 0–0.9)
ENA SS-B AB SER-ACNC: <0.2 AI (ref 0–0.9)
RHEUMATOID FACT SER QL LA: NEGATIVE
RYE IGE QN: NEGATIVE

## 2020-12-19 LAB — CCP IGA+IGG SERPL IA-ACNC: 4 UNITS (ref 0–19)

## 2021-01-30 ENCOUNTER — APPOINTMENT (EMERGENCY)
Dept: ULTRASOUND IMAGING | Facility: HOSPITAL | Age: 36
End: 2021-01-30
Payer: COMMERCIAL

## 2021-01-30 ENCOUNTER — HOSPITAL ENCOUNTER (EMERGENCY)
Facility: HOSPITAL | Age: 36
Discharge: HOME | End: 2021-01-30
Attending: EMERGENCY MEDICINE | Admitting: EMERGENCY MEDICINE
Payer: COMMERCIAL

## 2021-01-30 VITALS
SYSTOLIC BLOOD PRESSURE: 118 MMHG | TEMPERATURE: 97.5 F | RESPIRATION RATE: 20 BRPM | HEART RATE: 76 BPM | OXYGEN SATURATION: 99 % | WEIGHT: 205.91 LBS | DIASTOLIC BLOOD PRESSURE: 62 MMHG | BODY MASS INDEX: 38.91 KG/M2

## 2021-01-30 DIAGNOSIS — Z34.90 PREGNANCY AT EARLY STAGE: Primary | ICD-10-CM

## 2021-01-30 LAB
ALBUMIN SERPL BCP-MCNC: 3.6 G/DL (ref 3.5–5)
ALP SERPL-CCNC: 46 U/L (ref 46–116)
ALT SERPL W P-5'-P-CCNC: 27 U/L (ref 12–78)
ANION GAP SERPL CALCULATED.3IONS-SCNC: 10 MMOL/L (ref 4–13)
AST SERPL W P-5'-P-CCNC: 10 U/L (ref 5–45)
B-HCG SERPL-ACNC: ABNORMAL MIU/ML
BASOPHILS # BLD AUTO: 0.06 THOUSANDS/ΜL (ref 0–0.1)
BASOPHILS NFR BLD AUTO: 1 % (ref 0–1)
BILIRUB DIRECT SERPL-MCNC: 0.09 MG/DL (ref 0–0.2)
BILIRUB SERPL-MCNC: 0.3 MG/DL (ref 0.2–1)
BILIRUB UR QL STRIP: NEGATIVE
BUN SERPL-MCNC: 7 MG/DL (ref 5–25)
CALCIUM SERPL-MCNC: 9.1 MG/DL (ref 8.3–10.1)
CHLORIDE SERPL-SCNC: 104 MMOL/L (ref 100–108)
CLARITY UR: CLEAR
CO2 SERPL-SCNC: 25 MMOL/L (ref 21–32)
COLOR UR: COLORLESS
CREAT SERPL-MCNC: 0.79 MG/DL (ref 0.6–1.3)
EOSINOPHIL # BLD AUTO: 0.82 THOUSAND/ΜL (ref 0–0.61)
EOSINOPHIL NFR BLD AUTO: 9 % (ref 0–6)
ERYTHROCYTE [DISTWIDTH] IN BLOOD BY AUTOMATED COUNT: 12.1 % (ref 11.6–15.1)
EXT PREG TEST URINE: POSITIVE
EXT. CONTROL ED NAV: ABNORMAL
GFR SERPL CREATININE-BSD FRML MDRD: 97 ML/MIN/1.73SQ M
GLUCOSE SERPL-MCNC: 94 MG/DL (ref 65–140)
GLUCOSE UR STRIP-MCNC: NEGATIVE MG/DL
HCT VFR BLD AUTO: 40.9 % (ref 34.8–46.1)
HGB BLD-MCNC: 14.3 G/DL (ref 11.5–15.4)
HGB UR QL STRIP.AUTO: NEGATIVE
IMM GRANULOCYTES # BLD AUTO: 0.03 THOUSAND/UL (ref 0–0.2)
IMM GRANULOCYTES NFR BLD AUTO: 0 % (ref 0–2)
KETONES UR STRIP-MCNC: NEGATIVE MG/DL
LEUKOCYTE ESTERASE UR QL STRIP: NEGATIVE
LYMPHOCYTES # BLD AUTO: 2.6 THOUSANDS/ΜL (ref 0.6–4.47)
LYMPHOCYTES NFR BLD AUTO: 28 % (ref 14–44)
MCH RBC QN AUTO: 31.2 PG (ref 26.8–34.3)
MCHC RBC AUTO-ENTMCNC: 35 G/DL (ref 31.4–37.4)
MCV RBC AUTO: 89 FL (ref 82–98)
MONOCYTES # BLD AUTO: 0.6 THOUSAND/ΜL (ref 0.17–1.22)
MONOCYTES NFR BLD AUTO: 6 % (ref 4–12)
NEUTROPHILS # BLD AUTO: 5.35 THOUSANDS/ΜL (ref 1.85–7.62)
NEUTS SEG NFR BLD AUTO: 56 % (ref 43–75)
NITRITE UR QL STRIP: NEGATIVE
NRBC BLD AUTO-RTO: 0 /100 WBCS
PH UR STRIP.AUTO: 5.5 [PH]
PLATELET # BLD AUTO: 248 THOUSANDS/UL (ref 149–390)
PMV BLD AUTO: 9.5 FL (ref 8.9–12.7)
POTASSIUM SERPL-SCNC: 3.7 MMOL/L (ref 3.5–5.3)
PROT SERPL-MCNC: 6.9 G/DL (ref 6.4–8.2)
PROT UR STRIP-MCNC: NEGATIVE MG/DL
RBC # BLD AUTO: 4.58 MILLION/UL (ref 3.81–5.12)
SODIUM SERPL-SCNC: 139 MMOL/L (ref 136–145)
SP GR UR STRIP.AUTO: <=1.005 (ref 1–1.03)
UROBILINOGEN UR QL STRIP.AUTO: 0.2 E.U./DL
WBC # BLD AUTO: 9.46 THOUSAND/UL (ref 4.31–10.16)

## 2021-01-30 PROCEDURE — 99284 EMERGENCY DEPT VISIT MOD MDM: CPT | Performed by: EMERGENCY MEDICINE

## 2021-01-30 PROCEDURE — 80076 HEPATIC FUNCTION PANEL: CPT | Performed by: EMERGENCY MEDICINE

## 2021-01-30 PROCEDURE — 76815 OB US LIMITED FETUS(S): CPT

## 2021-01-30 PROCEDURE — 36415 COLL VENOUS BLD VENIPUNCTURE: CPT | Performed by: EMERGENCY MEDICINE

## 2021-01-30 PROCEDURE — 96372 THER/PROPH/DIAG INJ SC/IM: CPT

## 2021-01-30 PROCEDURE — 96374 THER/PROPH/DIAG INJ IV PUSH: CPT

## 2021-01-30 PROCEDURE — 93005 ELECTROCARDIOGRAM TRACING: CPT

## 2021-01-30 PROCEDURE — 81025 URINE PREGNANCY TEST: CPT | Performed by: EMERGENCY MEDICINE

## 2021-01-30 PROCEDURE — 99284 EMERGENCY DEPT VISIT MOD MDM: CPT

## 2021-01-30 PROCEDURE — 84702 CHORIONIC GONADOTROPIN TEST: CPT | Performed by: EMERGENCY MEDICINE

## 2021-01-30 PROCEDURE — 85025 COMPLETE CBC W/AUTO DIFF WBC: CPT | Performed by: EMERGENCY MEDICINE

## 2021-01-30 PROCEDURE — 81003 URINALYSIS AUTO W/O SCOPE: CPT | Performed by: EMERGENCY MEDICINE

## 2021-01-30 PROCEDURE — 80048 BASIC METABOLIC PNL TOTAL CA: CPT | Performed by: EMERGENCY MEDICINE

## 2021-01-30 PROCEDURE — 87086 URINE CULTURE/COLONY COUNT: CPT | Performed by: EMERGENCY MEDICINE

## 2021-01-30 RX ORDER — PNV NO.95/FERROUS FUM/FOLIC AC 28MG-0.8MG
1 TABLET ORAL DAILY
Qty: 120 CAPSULE | Refills: 0 | Status: SHIPPED | OUTPATIENT
Start: 2021-01-30 | End: 2022-07-18

## 2021-01-30 RX ORDER — ASPIRIN 325 MG
325 TABLET ORAL ONCE
Status: COMPLETED | OUTPATIENT
Start: 2021-01-30 | End: 2021-01-30

## 2021-01-30 RX ORDER — METOCLOPRAMIDE 10 MG/1
10 TABLET ORAL EVERY 6 HOURS
Qty: 30 TABLET | Refills: 0 | Status: SHIPPED | OUTPATIENT
Start: 2021-01-30 | End: 2021-03-25

## 2021-01-30 RX ORDER — DOXYLAMINE SUCCINATE AND PYRIDOXINE HYDROCHLORIDE, DELAYED RELEASE TABLETS 10 MG/10 MG 10; 10 MG/1; MG/1
25 TABLET, DELAYED RELEASE ORAL EVERY 6 HOURS
Qty: 120 TABLET | Refills: 0 | Status: SHIPPED | OUTPATIENT
Start: 2021-01-30 | End: 2021-03-25

## 2021-01-30 RX ORDER — ONDANSETRON 2 MG/ML
4 INJECTION INTRAMUSCULAR; INTRAVENOUS ONCE
Status: COMPLETED | OUTPATIENT
Start: 2021-01-30 | End: 2021-01-30

## 2021-01-30 RX ORDER — ONDANSETRON 4 MG/1
4 TABLET, ORALLY DISINTEGRATING ORAL EVERY 8 HOURS PRN
Qty: 20 TABLET | Refills: 0 | Status: SHIPPED | OUTPATIENT
Start: 2021-01-30 | End: 2021-08-29 | Stop reason: HOSPADM

## 2021-01-30 RX ADMIN — ENOXAPARIN SODIUM 40 MG: 40 INJECTION SUBCUTANEOUS at 21:08

## 2021-01-30 RX ADMIN — ASPIRIN 325 MG ORAL TABLET 325 MG: 325 PILL ORAL at 21:08

## 2021-01-30 RX ADMIN — ONDANSETRON 4 MG: 2 INJECTION INTRAMUSCULAR; INTRAVENOUS at 21:09

## 2021-01-30 NOTE — ED PROVIDER NOTES
History  Chief Complaint   Patient presents with    Pelvic Pain     left sided pelvic pain and lower back pain for a few days  Pt reports positive pregnancy test  On birth control, unknown how far along     38year old female patient presents emergency department for evaluation of left lower quadrant abdominal pain  The patient has had multiple urine pregnancy tests at home which were positive  Concern is for ectopic pregnancy as she is having progressively worsening pain  Plan will be for ultrasound the patient will be evaluated with a primary diagnosis of ectopic pregnancy until ruled out  History provided by:  Patient   used: No    Pelvic Pain  Severity:  Moderate  Onset quality:  Gradual  Timing:  Constant  Progression:  Worsening  Chronicity:  New  Associated symptoms: no cough, no ear pain, no fatigue and no shortness of breath        Prior to Admission Medications   Prescriptions Last Dose Informant Patient Reported? Taking?    NALTREXONE HCL PO   Yes No   Sig: Take 9 mg by mouth   TiZANidine (ZANAFLEX) 2 MG capsule   Yes No   acetaminophen (TYLENOL) 325 mg tablet   No No   Sig: Take 2 tablets (650 mg total) by mouth every 6 (six) hours as needed for mild pain   albuterol (2 5 mg/3 mL) 0 083 % nebulizer solution   No No   Sig: Take 1 vial (2 5 mg total) by nebulization every 4 (four) hours as needed for wheezing or shortness of breath   hydroxychloroquine (Plaquenil) 200 mg tablet   No No   Sig: Take 2 tablets (400 mg total) by mouth daily with breakfast      Facility-Administered Medications: None       Past Medical History:   Diagnosis Date    Antiphospholipid antibody syndrome (HCC) not on AC     Antiphospholipid syndrome during pregnancy ([facility] )     Fibromyalgia     Fibromyalgia, primary     [facility]        Past Surgical History:   Procedure Laterality Date     SECTION      LAPAROSCOPY AND HYSTEROSCOPY      [place] LAP,APPENDECTOMY N/A 2019 Procedure: APPENDECTOMY LAPAROSCOPIC;  Surgeon: Jo Cash MD;  Location: [facility];  Service: General       History reviewed  No pertinent family history  I have reviewed and agree with the history as documented  E-Cigarette/Vaping     E-Cigarette/Vaping Substances     Social History     Tobacco Use    Smoking status: Current Every Day Smoker     Packs/day: 0 50     Types: Cigarettes    Smokeless tobacco: Never Used   Substance Use Topics    Alcohol use: Yes     Frequency: Monthly or less     Drinks per session: 1 or 2     Binge frequency: Less than monthly     Comment: occasional    Drug use: No       Review of Systems   Constitutional: Negative for fatigue  HENT: Negative for ear pain  Respiratory: Negative for cough and shortness of breath  Genitourinary: Positive for pelvic pain  All other systems reviewed and are negative  Physical Exam  Physical Exam  Vitals signs and nursing note reviewed  Constitutional:       Appearance: She is well-developed  HENT:      Head: Normocephalic and atraumatic  Right Ear: External ear normal       Left Ear: External ear normal    Eyes:      Conjunctiva/sclera: Conjunctivae normal    Neck:      Thyroid: No thyromegaly  Vascular: No JVD  Trachea: No tracheal deviation  Cardiovascular:      Rate and Rhythm: Normal rate  Pulmonary:      Effort: Pulmonary effort is normal       Breath sounds: Normal breath sounds  No stridor  Abdominal:      General: There is no distension  Palpations: Abdomen is soft  There is no mass  Tenderness: There is no abdominal tenderness  There is no guarding  Hernia: No hernia is present  Musculoskeletal: Normal range of motion  General: No tenderness or deformity  Lymphadenopathy:      Cervical: No cervical adenopathy  Skin:     General: Skin is warm  Coloration: Skin is not pale  Findings: No erythema or rash     Neurological:      Mental Status: She is alert and oriented to person, place, and time     Psychiatric:         Behavior: Behavior normal          Vital Signs  ED Triage Vitals [01/30/21 1855]   Temperature Pulse Respirations Blood Pressure SpO2   97 5 °F (36 4 °C) 98 19 137/59 100 %      Temp Source Heart Rate Source Patient Position - Orthostatic VS BP Location FiO2 (%)   Temporal Monitor Sitting Left arm --      Pain Score       7           Vitals:    01/30/21 1855 01/30/21 2000 01/30/21 2100 01/30/21 2200   BP: 137/59 128/62 122/64 118/62   Pulse: 98 88 78 76   Patient Position - Orthostatic VS: Sitting Lying Lying Lying         Visual Acuity      ED Medications  Medications   ondansetron (ZOFRAN) injection 4 mg (4 mg Intravenous Given 1/30/21 2109)   enoxaparin (LOVENOX) subcutaneous injection 40 mg (40 mg Subcutaneous Given 1/30/21 2108)   aspirin tablet 325 mg (325 mg Oral Given 1/30/21 2108)       Diagnostic Studies  Results Reviewed     Procedure Component Value Units Date/Time    Hepatic function panel [[id number]]  (Normal) Collected: 01/30/21 2118    Lab Status: Final result Specimen: Blood from Hand, Left Updated: 01/30/21 2205     Total Bilirubin 0 30 mg/dL      Bilirubin, Direct 0 09 mg/dL      Alkaline Phosphatase 46 U/L      AST 10 U/L      ALT 27 U/L      Total Protein 6 9 g/dL      Albumin 3 6 g/dL     hCG, quantitative [[id number]]  (Abnormal) Collected: 01/30/21 2118    Lab Status: Final result Specimen: Blood from Hand, Left Updated: 01/30/21 2205     HCG, Quant 35,281 mIU/mL     Narrative:       Expected Ranges:     Approximate               Approximate HCG  Gestation age          Concentration ( mIU/mL)  _____________          ______________________   Delmar Beauchamp                      HCG values  0 2-1                       5-50  1-2                           2-3                         100-5000  3-4                         500-80909  4-5                         1000-66475  5-6                         [id number]  6-8 [id number]  8-12                        [id number]      Basic metabolic panel [[id number]] Collected: 01/30/21 2118    Lab Status: Final result Specimen: Blood from Hand, Left Updated: 01/30/21 2141     Sodium 139 mmol/L      Potassium 3 7 mmol/L      Chloride 104 mmol/L      CO2 25 mmol/L      ANION GAP 10 mmol/L      BUN 7 mg/dL      Creatinine 0 79 mg/dL      Glucose 94 mg/dL      Calcium 9 1 mg/dL      eGFR 97 ml/min/1 73sq m     Narrative:      [place] guidelines for Chronic Kidney Disease (CKD):     Stage 1 with normal or high GFR (GFR > 90 mL/min/1 73 square meters)    Stage 2 Mild CKD (GFR = 60-89 mL/min/1 73 square meters)    Stage 3A Moderate CKD (GFR = 45-59 mL/min/1 73 square meters)    Stage 3B Moderate CKD (GFR = 30-44 mL/min/1 73 square meters)    Stage 4 Severe CKD (GFR = 15-29 mL/min/1 73 square meters)    Stage 5 End Stage CKD (GFR <15 mL/min/1 73 square meters)  Note: GFR calculation is accurate only with a steady state creatinine    UA w Reflex to Microscopic w Reflex to Culture [[id number]] Collected: 01/30/21 2004    Lab Status: Final result Specimen: Urine, Clean Catch Updated: 01/30/21 2013     Color, UA Colorless     Clarity, UA Clear     Specific Gravity, UA <=1 005     pH, UA 5 5     Leukocytes, UA Negative     Nitrite, UA Negative     Protein, UA Negative mg/dl      Glucose, UA Negative mg/dl      Ketones, UA Negative mg/dl      Urobilinogen, UA 0 2 E U /dl      Bilirubin, UA Negative     Blood, UA Negative     URINE COMMENT --    Urine culture [[id number]] Collected: 01/30/21 2004    Lab Status:  In process Specimen: Urine, Clean Catch Updated: 01/30/21 2013    CBC and differential [[id number]]  (Abnormal) Collected: 01/30/21 2003    Lab Status: Final result Specimen: Blood from Arm, Right Updated: 01/30/21 2011     WBC 9 46 Thousand/uL      RBC 4 58 Million/uL      Hemoglobin 14 3 g/dL      Hematocrit 40 9 %      MCV 89 fL      MCH 31 2 pg      MCHC 35 0 g/dL      RDW 12 1 %      MPV 9 5 fL      Platelets 244 Thousands/uL      nRBC 0 /100 WBCs      Neutrophils Relative 56 %      Immat GRANS % 0 %      Lymphocytes Relative 28 %      Monocytes Relative 6 %      Eosinophils Relative 9 %      Basophils Relative 1 %      Neutrophils Absolute 5 35 Thousands/µL      Immature Grans Absolute 0 03 Thousand/uL      Lymphocytes Absolute 2 60 Thousands/µL      Monocytes Absolute 0 60 Thousand/µL      Eosinophils Absolute 0 82 Thousand/µL      Basophils Absolute 0 06 Thousands/µL     POCT pregnancy, urine [[id number]]  (Abnormal) Resulted: 01/30/21 1912    Lab Status: Final result Updated: 01/30/21 1912     EXT PREG TEST UR (Ref: Negative) Positive     Control Valid                 US OB pregnancy limited with transvaginal   Final Result by Mani Turcios MD (01/30 2013)      Single live intrauterine gestation at 6 weeks 1 day (range +/- 4 days) with fetal heart rate of 116 bpm       EDUARD of 9/24/2021  Workstation performed: [id number]                    Procedures  Procedures         ED Course                             SBIRT 22yo+      Most Recent Value   SBIRT (23 yo +)   In order to provide better care to our patients, we are screening all of our patients for alcohol and drug use  Would it be okay to ask you these screening questions? Yes Filed at: 01/30/2021 1904   Initial Alcohol Screen: US AUDIT-C    1  How often do you have a drink containing alcohol?  0 Filed at: 01/30/2021 1904   2  How many drinks containing alcohol do you have on a typical day you are drinking? 0 Filed at: 01/30/2021 1904   3a  Male UNDER 65: How often do you have five or more drinks on one occasion? 0 Filed at: 01/30/2021 1904   3b  FEMALE Any Age, or MALE 65+: How often do you have 4 or more drinks on one occassion? 0 Filed at: 01/30/2021 1904   Audit-C Score  0 Filed at: 01/30/2021 1904   ANTONIA: How many times in the past year have you       Used an illegal drug or used a prescription medication for non-medical reasons? Never Filed at: 01/30/2021 1904                    [facility]  Number of Diagnoses or Management Options  Pregnancy at early stage: new and requires workup     Amount and/or Complexity of Data Reviewed  Clinical lab tests: ordered and reviewed  Tests in the radiology section of CPT®: ordered and reviewed  Decide to obtain previous medical records or to obtain history from someone other than the patient: yes  Review and summarize past medical records: yes    Patient Progress  Patient progress: stable      Disposition  Final diagnoses:   Pregnancy at early stage     Time reflects when diagnosis was documented in both MDM as applicable and the Disposition within this note     Time User Action Codes Description Comment    1/30/2021 10:40 PM Damon Ramos Add [Z34 [id number]] Pregnancy at early stage       ED Disposition     ED Disposition Condition Date/Time Comment    Discharge Stable Sat Jan 30, 2021 10:40 PM Dona Black discharge to home/self care              Follow-up Information     Follow up With Specialties Details Why Contact Info    Michael Worrell, [address] [facility], Nurse Practitioner   [facility]      Jose Padilla MD Obstetrics and Gynecology, Obstetrics, Gynecology   [address] [address]  [phone]            Discharge Medication List as of 1/30/2021 10:45 PM      START taking these medications    Details   Doxylamine-Pyridoxine 10-10 MG TBEC Take 25 mg by mouth every 6 (six) hours, Starting Sat 1/30/2021, Print      enoxaparin (LOVENOX) 40 mg/0 4 mL Inject 0 4 mL (40 mg total) under the skin daily, Starting Sat 1/30/2021, Until Sun 5/30/2021, Print      metoclopramide (REGLAN) 10 mg tablet Take 1 tablet (10 mg total) by mouth every 6 (six) hours, Starting Sat 1/30/2021, Print      ondansetron (ZOFRAN-ODT) 4 mg disintegrating tablet Take 1 tablet (4 mg total) by mouth every 8 (eight) hours as needed for nausea or vomiting, Starting Sat 1/30/2021, Print      Prenatal MV-Min-Fe Fum-FA-DHA (Prenatal Multivitamin Plus DHA) 27-0 8-250 MG CAPS Take 1 tablet by mouth daily, Starting Sat 1/30/2021, Print         CONTINUE these medications which have NOT CHANGED    Details   acetaminophen (TYLENOL) 325 mg tablet Take 2 tablets (650 mg total) by mouth every 6 (six) hours as needed for mild pain, Starting Tue 12/31/2019, Normal      albuterol (2 5 mg/3 mL) 0 083 % nebulizer solution Take 1 vial (2 5 mg total) by nebulization every 4 (four) hours as needed for wheezing or shortness of breath, Starting Tue 6/11/2019, Print      hydroxychloroquine (Plaquenil) 200 mg tablet Take 2 tablets (400 mg total) by mouth daily with breakfast, Starting Wed 12/16/2020, Until Fri 1/15/2021, Normal      NALTREXONE HCL PO Take 9 mg by mouth, Historical Med      TiZANidine (ZANAFLEX) 2 MG capsule Starting Tue 2/26/2019, Historical Med           No discharge procedures on file      PDMP Review     None          ED Provider  Electronically Signed by           García Mcclain DO  01/31/21 4914

## 2021-01-31 LAB
ATRIAL RATE: 78 BPM
P AXIS: 34 DEGREES
PR INTERVAL: 134 MS
QRS AXIS: 49 DEGREES
QRSD INTERVAL: 80 MS
QT INTERVAL: 368 MS
QTC INTERVAL: 419 MS
T WAVE AXIS: 15 DEGREES
VENTRICULAR RATE: 78 BPM

## 2021-01-31 PROCEDURE — 93010 ELECTROCARDIOGRAM REPORT: CPT | Performed by: INTERNAL MEDICINE

## 2021-01-31 NOTE — ED NOTES
Pt returned from [address] [place] [address] [address]       Maurisio Naranjo RN  01/30/21 3530

## 2021-01-31 NOTE — ED NOTES
Pt transferred to [address] before blood work and EKG could be performed  Provider is aware        Maurisio Naranjo RN  01/30/21 5141

## 2021-02-02 LAB — BACTERIA UR CULT: NORMAL

## 2021-02-08 ENCOUNTER — HOSPITAL ENCOUNTER (EMERGENCY)
Facility: HOSPITAL | Age: 36
Discharge: HOME | End: 2021-02-08
Attending: EMERGENCY MEDICINE
Payer: COMMERCIAL

## 2021-02-08 VITALS
HEART RATE: 87 BPM | RESPIRATION RATE: 18 BRPM | DIASTOLIC BLOOD PRESSURE: 58 MMHG | TEMPERATURE: 98.7 F | HEIGHT: 61 IN | BODY MASS INDEX: 39.29 KG/M2 | OXYGEN SATURATION: 97 % | WEIGHT: 208.11 LBS | SYSTOLIC BLOOD PRESSURE: 102 MMHG

## 2021-02-08 DIAGNOSIS — O20.0 THREATENED ABORTION: ICD-10-CM

## 2021-02-08 DIAGNOSIS — O46.90 VAGINAL BLEEDING DURING PREGNANCY: ICD-10-CM

## 2021-02-08 DIAGNOSIS — N97.8 INFERTILITY DUE TO LUTEAL PHASE DEFECT: Primary | ICD-10-CM

## 2021-02-08 LAB
ABO GROUP BLD: NORMAL
ALBUMIN SERPL BCP-MCNC: 3.4 G/DL (ref 3.5–5)
ALP SERPL-CCNC: 53 U/L (ref 46–116)
ALT SERPL W P-5'-P-CCNC: 45 U/L (ref 12–78)
ANION GAP SERPL CALCULATED.3IONS-SCNC: 12 MMOL/L (ref 4–13)
AST SERPL W P-5'-P-CCNC: 16 U/L (ref 5–45)
ATRIAL RATE: 100 BPM
B-HCG SERPL-ACNC: ABNORMAL MIU/ML
BACTERIA UR QL AUTO: ABNORMAL /HPF
BASOPHILS # BLD AUTO: 0.04 THOUSANDS/ΜL (ref 0–0.1)
BASOPHILS NFR BLD AUTO: 0 % (ref 0–1)
BILIRUB DIRECT SERPL-MCNC: 0.1 MG/DL (ref 0–0.2)
BILIRUB SERPL-MCNC: 0.2 MG/DL (ref 0.2–1)
BILIRUB UR QL STRIP: NEGATIVE
BLD GP AB SCN SERPL QL: NEGATIVE
BUN SERPL-MCNC: 10 MG/DL (ref 5–25)
CALCIUM SERPL-MCNC: 9 MG/DL (ref 8.3–10.1)
CHLORIDE SERPL-SCNC: 105 MMOL/L (ref 100–108)
CLARITY UR: ABNORMAL
CO2 SERPL-SCNC: 22 MMOL/L (ref 21–32)
COLOR UR: ABNORMAL
CREAT SERPL-MCNC: 0.77 MG/DL (ref 0.6–1.3)
EOSINOPHIL # BLD AUTO: 0.85 THOUSAND/ΜL (ref 0–0.61)
EOSINOPHIL NFR BLD AUTO: 8 % (ref 0–6)
ERYTHROCYTE [DISTWIDTH] IN BLOOD BY AUTOMATED COUNT: 12.4 % (ref 11.6–15.1)
GFR SERPL CREATININE-BSD FRML MDRD: 100 ML/MIN/1.73SQ M
GLUCOSE SERPL-MCNC: 90 MG/DL (ref 65–140)
GLUCOSE UR STRIP-MCNC: NEGATIVE MG/DL
HCT VFR BLD AUTO: 36.2 % (ref 34.8–46.1)
HGB BLD-MCNC: 12.4 G/DL (ref 11.5–15.4)
HGB UR QL STRIP.AUTO: ABNORMAL
IMM GRANULOCYTES # BLD AUTO: 0.05 THOUSAND/UL (ref 0–0.2)
IMM GRANULOCYTES NFR BLD AUTO: 1 % (ref 0–2)
KETONES UR STRIP-MCNC: NEGATIVE MG/DL
LEUKOCYTE ESTERASE UR QL STRIP: NEGATIVE
LYMPHOCYTES # BLD AUTO: 2.64 THOUSANDS/ΜL (ref 0.6–4.47)
LYMPHOCYTES NFR BLD AUTO: 26 % (ref 14–44)
MCH RBC QN AUTO: 30.8 PG (ref 26.8–34.3)
MCHC RBC AUTO-ENTMCNC: 34.3 G/DL (ref 31.4–37.4)
MCV RBC AUTO: 90 FL (ref 82–98)
MONOCYTES # BLD AUTO: 0.64 THOUSAND/ΜL (ref 0.17–1.22)
MONOCYTES NFR BLD AUTO: 6 % (ref 4–12)
NEUTROPHILS # BLD AUTO: 6.11 THOUSANDS/ΜL (ref 1.85–7.62)
NEUTS SEG NFR BLD AUTO: 59 % (ref 43–75)
NITRITE UR QL STRIP: NEGATIVE
NON-SQ EPI CELLS URNS QL MICRO: ABNORMAL /HPF
NRBC BLD AUTO-RTO: 0 /100 WBCS
P AXIS: 6 DEGREES
PH UR STRIP.AUTO: 7 [PH]
PLATELET # BLD AUTO: 278 THOUSANDS/UL (ref 149–390)
PMV BLD AUTO: 8.9 FL (ref 8.9–12.7)
POTASSIUM SERPL-SCNC: 3.3 MMOL/L (ref 3.5–5.3)
PR INTERVAL: 130 MS
PROT SERPL-MCNC: 6.8 G/DL (ref 6.4–8.2)
PROT UR STRIP-MCNC: NEGATIVE MG/DL
QRS AXIS: -4 DEGREES
QRSD INTERVAL: 78 MS
QT INTERVAL: 350 MS
QTC INTERVAL: 451 MS
RBC # BLD AUTO: 4.02 MILLION/UL (ref 3.81–5.12)
RBC #/AREA URNS AUTO: ABNORMAL /HPF
RH BLD: POSITIVE
SODIUM SERPL-SCNC: 139 MMOL/L (ref 136–145)
SP GR UR STRIP.AUTO: 1.02 (ref 1–1.03)
SPECIMEN EXPIRATION DATE: NORMAL
T WAVE AXIS: 20 DEGREES
UROBILINOGEN UR QL STRIP.AUTO: 0.2 E.U./DL
VENTRICULAR RATE: 100 BPM
WBC # BLD AUTO: 10.33 THOUSAND/UL (ref 4.31–10.16)
WBC #/AREA URNS AUTO: ABNORMAL /HPF

## 2021-02-08 PROCEDURE — 36415 COLL VENOUS BLD VENIPUNCTURE: CPT | Performed by: EMERGENCY MEDICINE

## 2021-02-08 PROCEDURE — 84702 CHORIONIC GONADOTROPIN TEST: CPT | Performed by: EMERGENCY MEDICINE

## 2021-02-08 PROCEDURE — 80076 HEPATIC FUNCTION PANEL: CPT | Performed by: EMERGENCY MEDICINE

## 2021-02-08 PROCEDURE — 85025 COMPLETE CBC W/AUTO DIFF WBC: CPT | Performed by: EMERGENCY MEDICINE

## 2021-02-08 PROCEDURE — 93005 ELECTROCARDIOGRAM TRACING: CPT

## 2021-02-08 PROCEDURE — 86901 BLOOD TYPING SEROLOGIC RH(D): CPT | Performed by: EMERGENCY MEDICINE

## 2021-02-08 PROCEDURE — 86900 BLOOD TYPING SEROLOGIC ABO: CPT | Performed by: EMERGENCY MEDICINE

## 2021-02-08 PROCEDURE — 99284 EMERGENCY DEPT VISIT MOD MDM: CPT

## 2021-02-08 PROCEDURE — 86850 RBC ANTIBODY SCREEN: CPT | Performed by: EMERGENCY MEDICINE

## 2021-02-08 PROCEDURE — 80048 BASIC METABOLIC PNL TOTAL CA: CPT | Performed by: EMERGENCY MEDICINE

## 2021-02-08 PROCEDURE — 81001 URINALYSIS AUTO W/SCOPE: CPT | Performed by: EMERGENCY MEDICINE

## 2021-02-08 PROCEDURE — 93010 ELECTROCARDIOGRAM REPORT: CPT | Performed by: INTERNAL MEDICINE

## 2021-02-08 PROCEDURE — 99284 EMERGENCY DEPT VISIT MOD MDM: CPT | Performed by: EMERGENCY MEDICINE

## 2021-02-09 NOTE — ED PROVIDER NOTES
History  Chief Complaint   Patient presents with    Vaginal Bleeding - Pregnant     Pt  reports vaginal bleeding began this am and positive pregnancy test x1 week     38year old female patient presents to the emergency department for evaluation of vaginal bleeding  The patient is known to be pregnant, she has had a history of multiple losses as well as subchorionic hemorrhage  The patient does have outpatient follow-up scheduled  Currently patient is lying in bed, no apparent distress, speaking full sentences  Bedside ultrasound does show a viable intrauterine pregnancy  There is what appears to be a subchorionic hemorrhage  I did discuss the patient with on-call OBGYN, they are agreement on starting the patient with progesterone  The patient has a prescription for progesterone was sent in by the obstetrician, I attempted to order the medication for the patient here in the emergency department however it is unavailable at this campus  History provided by:  Patient   used: No    Vaginal Bleeding  Quality:  Bright red  Severity:  Mild  Onset quality:  Gradual  Timing:  Intermittent  Progression:  Waxing and waning  Chronicity:  New  Risk factors: no hx of endometriosis, does not have multiple partners, no ovarian cysts, no terminated pregnancies and does not have unprotected sex        Prior to Admission Medications   Prescriptions Last Dose Informant Patient Reported? Taking?    Doxylamine-Pyridoxine 10-10 MG TBEC   No No   Sig: Take 25 mg by mouth every 6 (six) hours   NALTREXONE HCL PO   Yes No   Sig: Take 9 mg by mouth   Prenatal MV-Min-Fe Fum-FA-DHA (Prenatal Multivitamin Plus DHA) 27-0 8-250 MG CAPS   No No   Sig: Take 1 tablet by mouth daily   TiZANidine (ZANAFLEX) 2 MG capsule   Yes No   acetaminophen (TYLENOL) 325 mg tablet   No No   Sig: Take 2 tablets (650 mg total) by mouth every 6 (six) hours as needed for mild pain   albuterol (2 5 mg/3 mL) 0 083 % nebulizer solution   No No   Sig: Take 1 vial (2 5 mg total) by nebulization every 4 (four) hours as needed for wheezing or shortness of breath   enoxaparin (LOVENOX) 40 mg/0 4 mL   No No   Sig: Inject 0 4 mL (40 mg total) under the skin daily   hydroxychloroquine (Plaquenil) 200 mg tablet   No No   Sig: Take 2 tablets (400 mg total) by mouth daily with breakfast   Patient not taking: Reported on 2021   metoclopramide (REGLAN) 10 mg tablet   No No   Sig: Take 1 tablet (10 mg total) by mouth every 6 (six) hours   ondansetron (ZOFRAN-ODT) 4 mg disintegrating tablet   No No   Sig: Take 1 tablet (4 mg total) by mouth every 8 (eight) hours as needed for nausea or vomiting      Facility-Administered Medications: None       Past Medical History:   Diagnosis Date    Antiphospholipid antibody syndrome (HCC) not on AC     [facility]     Fibromyalgia     Fibromyalgia, primary     Sjogren's disease ([facility] )        Past Surgical History:   Procedure Laterality Date     SECTION      LAPAROSCOPY AND HYSTEROSCOPY      ND LAP,APPENDECTOMY N/A 2019    Procedure: APPENDECTOMY LAPAROSCOPIC;  Surgeon: Annalisa Phillips MD;  Location: [facility];  Service: General       History reviewed  No pertinent family history  I have reviewed and agree with the history as documented  E-Cigarette/Vaping     E-Cigarette/Vaping Substances     Social History     Tobacco Use    Smoking status: Current Every Day Smoker     Packs/day: 0 50     Types: Cigarettes    Smokeless tobacco: Never Used   Substance Use Topics    Alcohol use: Yes     Frequency: Monthly or less     Drinks per session: 1 or 2     Binge frequency: Less than monthly     Comment: occasional    Drug use: No       Review of Systems   Genitourinary: Positive for vaginal bleeding  All other systems reviewed and are negative  Physical Exam  Physical Exam  Vitals signs and nursing note reviewed     Constitutional:       Appearance: She is well-developed  HENT:      Head: Normocephalic and atraumatic  Right Ear: External ear normal       Left Ear: External ear normal    Eyes:      Conjunctiva/sclera: Conjunctivae normal    Neck:      Thyroid: No thyromegaly  Vascular: No JVD  Trachea: No tracheal deviation  Cardiovascular:      Rate and Rhythm: Normal rate  Pulmonary:      Effort: Pulmonary effort is normal       Breath sounds: Normal breath sounds  No stridor  Abdominal:      General: There is no distension  Palpations: Abdomen is soft  There is no mass  Tenderness: There is no abdominal tenderness  There is no guarding  Hernia: No hernia is present  Musculoskeletal: Normal range of motion  General: No tenderness or deformity  Lymphadenopathy:      Cervical: No cervical adenopathy  Skin:     General: Skin is warm  Coloration: Skin is not pale  Findings: No erythema or rash  Neurological:      Mental Status: She is alert and oriented to person, place, and time     Psychiatric:         Behavior: Behavior normal          Vital Signs  ED Triage Vitals   Temperature Pulse Respirations Blood Pressure SpO2   02/08/21 1926 02/08/21 1901 02/08/21 1901 02/08/21 1901 02/08/21 1901   98 7 °F (37 1 °C) 100 20 131/68 100 %      Temp Source Heart Rate Source Patient Position - Orthostatic VS BP Location FiO2 (%)   02/08/21 1926 02/08/21 1901 02/08/21 1901 02/08/21 1901 --   Oral Monitor Lying Right arm       Pain Score       --                  Vitals:    02/08/21 1907 02/08/21 1930 02/08/21 2000 02/08/21 2030   BP: 131/68 115/59 109/59 102/58   Pulse: 92 100 92 87   Patient Position - Orthostatic VS: Lying Lying Lying Lying         Visual Acuity      ED Medications  Medications - No data to display    Diagnostic Studies  Results Reviewed     Procedure Component Value Units Date/Time    Urine culture [[id number]] Updated: 02/10/21 0718    Lab Status: No result Specimen: Urine, Clean Catch     Hepatic function panel [[id number]]  (Abnormal) Collected: 02/08/21 1915    Lab Status: Final result Specimen: Blood from Arm, Right Updated: 02/08/21 2000     Total Bilirubin 0 20 mg/dL      Bilirubin, Direct 0 10 mg/dL      Alkaline Phosphatase 53 U/L      AST 16 U/L      ALT 45 U/L      Total Protein 6 8 g/dL      Albumin 3 4 g/dL     hCG, quantitative [[id number]]  (Abnormal) Collected: 02/08/21 1915    Lab Status: Final result Specimen: Blood from Arm, Right Updated: 02/08/21 2000     HCG, Quant 61,128 mIU/mL     Narrative:       Expected Ranges:     Approximate               Approximate HCG  Gestation age          Concentration ( mIU/mL)  _____________          ______________________   Bhakti Burns                      HCG values  0 2-1                       5-50  1-2                           2-3                         100-5000  3-4                         500-16144  4-5                         1000-84137  5-6                         [id number]  6-8                         97703-476391  8-12                        30605-490920      Basic metabolic panel [[id number]]  (Abnormal) Collected: 02/08/21 1915    Lab Status: Final result Specimen: Blood from Arm, Right Updated: 02/08/21 1935     Sodium 139 mmol/L      Potassium 3 3 mmol/L      Chloride 105 mmol/L      CO2 22 mmol/L      ANION GAP 12 mmol/L      BUN 10 mg/dL      Creatinine 0 77 mg/dL      Glucose 90 mg/dL      Calcium 9 0 mg/dL      eGFR 100 ml/min/1 73sq m     Narrative:      [place] guidelines for Chronic Kidney Disease (CKD):     Stage 1 with normal or high GFR (GFR > 90 mL/min/1 73 square meters)    Stage 2 Mild CKD (GFR = 60-89 mL/min/1 73 square meters)    Stage 3A Moderate CKD (GFR = 45-59 mL/min/1 73 square meters)    Stage 3B Moderate CKD (GFR = 30-44 mL/min/1 73 square meters)    Stage 4 Severe CKD (GFR = 15-29 mL/min/1 73 square meters)    Stage 5 End Stage CKD (GFR <15 mL/min/1 73 square meters)  Note: GFR calculation is accurate only with a steady state creatinine    Urine Microscopic [[id number]]  (Abnormal) Collected: 02/08/21 1906    Lab Status: Final result Specimen: Urine, Clean Catch Updated: 02/08/21 1929     RBC, UA 20-30 /hpf      WBC, UA 0-1 /hpf      Epithelial Cells None Seen /hpf      Bacteria, UA None Seen /hpf      URINE COMMENT --    UA w Reflex to Microscopic w Reflex to Culture [[id number]]  (Abnormal) Collected: 02/08/21 1906    Lab Status: Final result Specimen: Urine, Clean Catch Updated: 02/08/21 1927     Color, UA Orange     Clarity, UA Slightly Cloudy     Specific [employer], UA 1 020     pH, UA 7 0     Leukocytes, UA Negative     Nitrite, UA Negative     Protein, UA Negative mg/dl      Glucose, UA Negative mg/dl      Ketones, UA Negative mg/dl      Urobilinogen, UA 0 2 E U /dl      Bilirubin, UA Negative     Blood, UA Large     URINE COMMENT --    CBC and differential [[id number]]  (Abnormal) Collected: 02/08/21 1915    Lab Status: Final result Specimen: Blood from Arm, Right Updated: 02/08/21 1925     WBC 10 33 Thousand/uL      RBC 4 02 Million/uL      Hemoglobin 12 4 g/dL      Hematocrit 36 2 %      MCV 90 fL      MCH 30 8 pg      MCHC 34 3 g/dL      RDW 12 4 %      MPV 8 9 fL      Platelets 199 Thousands/uL      nRBC 0 /100 WBCs      Neutrophils Relative 59 %      Immat GRANS % 1 %      Lymphocytes Relative 26 %      Monocytes Relative 6 %      Eosinophils Relative 8 %      Basophils Relative 0 %      Neutrophils Absolute 6 11 Thousands/µL      Immature Grans Absolute 0 05 Thousand/uL      Lymphocytes Absolute 2 64 Thousands/µL      Monocytes Absolute 0 64 Thousand/µL      Eosinophils Absolute 0 85 Thousand/µL      Basophils Absolute 0 04 Thousands/µL                  No orders to display              Procedures  Procedures         ED Course                             SBIRT 22yo+      Most Recent Value   SBIRT (22 yo +)   In order to provide better care to our patients, we are screening all of our patients for alcohol and drug use  Would it be okay to ask you these screening questions? Yes Filed at: 2021   Initial Alcohol Screen: US AUDIT-C    1  How often do you have a drink containing alcohol? 1 Filed at: 2021   2  How many drinks containing alcohol do you have on a typical day you are drinking? 0 Filed at: 2021   3a  Male UNDER 65: How often do you have five or more drinks on one occasion? 0 Filed at: 2021   3b  FEMALE Any Age, or MALE 65+: How often do you have 4 or more drinks on one occassion? 0 Filed at: 2021   Audit-C Score  1 Filed at: 2021   ANTONIA: How many times in the past year have you    Used an illegal drug or used a prescription medication for non-medical reasons?   Never Filed at: 2021                    MDM  Number of Diagnoses or Management Options  Threatened : new and requires workup  Vaginal bleeding during pregnancy: new and requires workup     Amount and/or Complexity of Data Reviewed  Clinical lab tests: ordered and reviewed  Tests in the radiology section of CPT®: reviewed  Decide to obtain previous medical records or to obtain history from someone other than the patient: yes  Review and summarize past medical records: yes    Patient Progress  Patient progress: stable      Disposition  Final diagnoses:   Threatened    Vaginal bleeding during pregnancy     Time reflects when diagnosis was documented in both MDM as applicable and the Disposition within this note     Time User Action Codes Description Comment    2021  8:23 PM Silke Madden Add [N97 8] 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     2021  8:28 PM Ross Canada Add [O20 0] Threatened      2021  8:28 PM Ross Canada Add [O46 90] Vaginal bleeding during pregnancy       ED Disposition     ED Disposition Condition Date/Time Comment    Discharge Stable   8:28 PM Germania Almanza discharge to home/self care  Follow-up Information     Follow up With Specialties Details Why Contact Info    Tala Cook MD Obstetrics and Gynecology   [address]  [facility]  [address]   [phone]            Discharge Medication List as of 2/8/2021  8:28 PM      START taking these medications    Details   progesterone (PROMETRIUM) 200 MG capsule Take 1 capsule (200 mg total) by mouth daily, Starting Mon 2/8/2021, Normal         CONTINUE these medications which have NOT CHANGED    Details   acetaminophen (TYLENOL) 325 mg tablet Take 2 tablets (650 mg total) by mouth every 6 (six) hours as needed for mild pain, Starting Tue 12/31/2019, Normal      albuterol (2 5 mg/3 mL) 0 083 % nebulizer solution Take 1 vial (2 5 mg total) by nebulization every 4 (four) hours as needed for wheezing or shortness of breath, Starting Tue 6/11/2019, Print      Doxylamine-Pyridoxine 10-10 MG TBEC Take 25 mg by mouth every 6 (six) hours, Starting Sat 1/30/2021, Print      enoxaparin (LOVENOX) 40 mg/0 4 mL Inject 0 4 mL (40 mg total) under the skin daily, Starting Sat 1/30/2021, Until Sun 5/30/2021, Print      hydroxychloroquine (Plaquenil) 200 mg tablet Take 2 tablets (400 mg total) by mouth daily with breakfast, Starting Wed 12/16/2020, Until Fri 1/15/2021, Normal      metoclopramide (REGLAN) 10 mg tablet Take 1 tablet (10 mg total) by mouth every 6 (six) hours, Starting Sat 1/30/2021, Print      NALTREXONE HCL PO Take 9 mg by mouth, Historical Med      ondansetron (ZOFRAN-ODT) 4 mg disintegrating tablet Take 1 tablet (4 mg total) by mouth every 8 (eight) hours as needed for nausea or vomiting, Starting Sat 1/30/2021, Print      Prenatal MV-Min-Fe Fum-FA-DHA (Prenatal Multivitamin Plus DHA) 27-0 8-250 MG CAPS Take 1 tablet by mouth daily, Starting Sat 1/30/2021, Print      TiZANidine (ZANAFLEX) 2 MG capsule Starting Tue 2/26/2019, Historical Med           No discharge procedures on file      PDMP Review     None ED Provider  Electronically Signed by           Lisa Mitchell DO  02/15/21 7569

## 2021-02-09 NOTE — QUICK NOTE
Patient in ER with bleeding in the first trimester  Hgb 12 4  VSS  Viable IUP with likely subchorionic hemorrhage  Prior pregnancies treated with progesterone for luteal phase defect  200 mg prometrium daily sent to pharmacy for next 4 wks  Appt 2/11 without office to be kept      Jaqueline Majano MD

## 2021-02-11 ENCOUNTER — OFFICE VISIT (OUTPATIENT)
Dept: OBGYN CLINIC | Age: 36
End: 2021-02-11
Payer: COMMERCIAL

## 2021-02-11 VITALS — SYSTOLIC BLOOD PRESSURE: 118 MMHG | DIASTOLIC BLOOD PRESSURE: 80 MMHG | BODY MASS INDEX: 39.11 KG/M2 | WEIGHT: 207 LBS

## 2021-02-11 DIAGNOSIS — Q51.3 BICORNATE UTERUS COMPLICATING PREGNANCY, FIRST TRIMESTER: ICD-10-CM

## 2021-02-11 DIAGNOSIS — O99.119 ANTIPHOSPHOLIPID SYNDROME DURING PREGNANCY (HCC): ICD-10-CM

## 2021-02-11 DIAGNOSIS — O09.91 HIGH-RISK PREGNANCY IN FIRST TRIMESTER: Primary | ICD-10-CM

## 2021-02-11 DIAGNOSIS — O34.01 BICORNATE UTERUS COMPLICATING PREGNANCY, FIRST TRIMESTER: ICD-10-CM

## 2021-02-11 DIAGNOSIS — D68.61 ANTIPHOSPHOLIPID SYNDROME DURING PREGNANCY (HCC): ICD-10-CM

## 2021-02-11 PROBLEM — Z30.2 REQUEST FOR STERILIZATION: Status: ACTIVE | Noted: 2021-02-11

## 2021-02-11 PROBLEM — Z98.891 HISTORY OF CESAREAN SECTION: Status: ACTIVE | Noted: 2021-02-11

## 2021-02-11 PROCEDURE — 99203 OFFICE O/P NEW LOW 30 MIN: CPT | Performed by: STUDENT IN AN ORGANIZED HEALTH CARE EDUCATION/TRAINING PROGRAM

## 2021-02-11 NOTE — ASSESSMENT & PLAN NOTE
Would like tubal sterilization at time of repeat  section  Had previously planned for vasectomy however postponed due to COVID

## 2021-02-11 NOTE — PROGRESS NOTES
Problem visit New - Gynecology   Sourav Rudolph 38 y o  female MRN: [id number]  [address] [facility]  Encounter: [id number]    Chief Complaint   Patient presents with    Gynecologic Exam       Assessment/Plan     Assessment:  38 y o  [id number] with multiple medical comorbidities at 7 6/7 weeks gestational age  Problem List Items Addressed This Visit        Hematopoietic and Hemostatic    Antiphospholipid syndrome during pregnancy ([address] )     On Lovenox daily  Had self dc'd due to subchorionic hemorrhage and vaginal bleeding  Plan to restart at this time as no additional vaginal bleeding appreciated            Genitourinary    Bicornate uterus complicating pregnancy, first trimester     Appreciated on ultrasound exam today         Relevant Orders    Ambulatory Referral to Maternal Fetal Medicine    Uterine rupture during labor     At time of TOLAC, has had repeat  section since then at 36 weeks         Relevant Orders    Ambulatory Referral to Maternal Fetal Medicine       Other    High-risk pregnancy in first trimester - Primary    Relevant Orders    Ambulatory Referral to Maternal Fetal Medicine        Referral made to M today  For Nurse intake and PN1 as well  Chart review, face-to-face patient care, and documentation taking total of >30 minutes  ----------------------------------------------    History of Present Illness     Sourav Rudolph is a 38 y o  female [id number] at 9 6/7 weeks gestational age who presents to establish care  Previously seen in [facility] ED on 2021 with vaginal bleeding, diagnosed with likely subchorionic hemorrhage    Since that time self discontinued Lovenox  Has not had any spotting over the last day  Does have complicated pregnancy history with APLS, bicornuate uterus, history  section x3, history uterine rupture, history IUGR    REVIEW OF SYSTEMS  12 point review of systems negative aside from HPI      Past Medical History:   Diagnosis Date    Antiphospholipid antibody syndrome (HCC) not on AC     Antiphospholipid syndrome during pregnancy ([address] )     Fibromyalgia     Fibromyalgia, primary     Sjogren's disease ([address] )      Past Surgical History:   Procedure Laterality Date     SECTION      LAPAROSCOPY AND HYSTEROSCOPY      AR LAP,APPENDECTOMY N/A 2019    Procedure: APPENDECTOMY LAPAROSCOPIC;  Surgeon: Kevin Maier MD;  Location: [facility];  Service: General       OB History        10    Para   4    Term   3       1    AB   5    Living   4       SAB        TAB        Ectopic        Multiple        Live Births                   Bicornuate uterus  [id number]    1 AB (visualized heartbeat)  D+Cx1  SAB x4     39 weeks , induction for bicornuate, velamentous cord insertion, on lovenox for blood clots within placenta and cord, 6 lb 9 oz   39 week  section IUGR, spontaneous labor, fetal distress, 5 lb 15 oz, postpartum hemorrhage 2 L, did not receive blood transfusion (had wanted to breastfeed)   39 weeks, uterine rupture  Had planned for attempted TOLAC  Progressed to 10 cm, noted footling breech at time to deliver with cord visualized, intact    At time of  section noted to have uterine rupture 7lb 8 oz   36 weeks repeat  section, 7lbs, on progesterone    On anticoagulation for all pregnancies (heparin vs Lovenox)    Social History   Social History     Substance and Sexual Activity   Alcohol Use Yes    Frequency: Monthly or less    Drinks per session: 1 or 2    Binge frequency: Less than monthly    Comment: occasional     Social History     Substance and Sexual Activity   Drug Use No     Social History     Tobacco Use   Smoking Status Current Every Day Smoker    Packs/day: 0 50    Types: Cigarettes   Smokeless Tobacco Never Used       Current Outpatient Medications:     acetaminophen (TYLENOL) 325 mg tablet, Take 2 tablets (650 mg total) by mouth every 6 (six) hours as needed for mild pain, Disp: 30 tablet, Rfl: 0    albuterol (2 5 mg/3 mL) 0 083 % nebulizer solution, Take 1 vial (2 5 mg total) by nebulization every 4 (four) hours as needed for wheezing or shortness of breath, Disp: 75 mL, Rfl: 0    Doxylamine-Pyridoxine 10-10 MG TBEC, Take 25 mg by mouth every 6 (six) hours, Disp: 120 tablet, Rfl: 0    enoxaparin (LOVENOX) 40 mg/0 4 mL, Inject 0 4 mL (40 mg total) under the skin daily, Disp: 48 mL, Rfl: 0    metoclopramide (REGLAN) 10 mg tablet, Take 1 tablet (10 mg total) by mouth every 6 (six) hours, Disp: 30 tablet, Rfl: 0    ondansetron (ZOFRAN-ODT) 4 mg disintegrating tablet, Take 1 tablet (4 mg total) by mouth every 8 (eight) hours as needed for nausea or vomiting, Disp: 20 tablet, Rfl: 0    Prenatal MV-Min-Fe Fum-FA-DHA (Prenatal Multivitamin Plus DHA) 27-0 8-250 MG CAPS, Take 1 tablet by mouth daily, Disp: 120 capsule, Rfl: 0    progesterone (PROMETRIUM) 200 MG capsule, Take 1 capsule (200 mg total) by mouth daily, Disp: 30 capsule, Rfl: 0    hydroxychloroquine (Plaquenil) 200 mg tablet, Take 2 tablets (400 mg total) by mouth daily with breakfast (Patient not taking: Reported on 2/11/2021), Disp: 60 tablet, Rfl: 1    NALTREXONE HCL PO, Take 9 mg by mouth, Disp: , Rfl:     TiZANidine (ZANAFLEX) 2 MG capsule, , Disp: , Rfl:   Allergies   Allergen Reactions    Valacyclovir Itching and Swelling       Objective   Vitals: Blood pressure 118/80, weight 93 9 kg (207 lb), not currently breastfeeding  Body mass index is 39 11 kg/m²  General: NAD, AAOx3  Heart: non-tachycardic  Lungs: non-labored breathing, symmetric chest rise    Transabdominal ultrasound ,  Bicornuate/arcuate uterus appreciated, single intrauterine pregnancy    Speculum exam: cervix closed without lesion, no blood in vaginal vault  On valsalva no blood at os  Lab Results:   No visits with results within 1 Day(s) from this visit     Latest known visit with results is: Admission on 02/08/2021, Discharged on 02/08/2021   Component Date Value    WBC 02/08/2021 10 33*    RBC 02/08/2021 4 02     Hemoglobin 02/08/2021 12 4     Hematocrit 02/08/2021 36 2     MCV 02/08/2021 90     MCH 02/08/2021 30 8     MCHC 02/08/2021 34 3     RDW 02/08/2021 12 4     MPV 02/08/2021 8 9     Platelets 67/45/8012 278     nRBC 02/08/2021 0     Neutrophils Relative 02/08/2021 59     Immat GRANS % 02/08/2021 1     Lymphocytes Relative 02/08/2021 26     Monocytes Relative 02/08/2021 6     Eosinophils Relative 02/08/2021 8*    Basophils Relative 02/08/2021 0     Neutrophils Absolute 02/08/2021 6 11     Immature Grans Absolute 02/08/2021 0 05     Lymphocytes Absolute 02/08/2021 2 64     Monocytes Absolute 02/08/2021 0 64     Eosinophils Absolute 02/08/2021 0 85*    Basophils Absolute 02/08/2021 0 04     Sodium 02/08/2021 139     Potassium 02/08/2021 3 3*    Chloride 02/08/2021 105     CO2 02/08/2021 22     ANION GAP 02/08/2021 12     BUN 02/08/2021 10     Creatinine 02/08/2021 0 77     Glucose 02/08/2021 90     Calcium 02/08/2021 9 0     eGFR 02/08/2021 100     Total Bilirubin 02/08/2021 0 20     Bilirubin, Direct 02/08/2021 0 10     Alkaline Phosphatase 02/08/2021 53     AST 02/08/2021 16     ALT 02/08/2021 45     Total Protein 02/08/2021 6 8     Albumin 02/08/2021 3 4*    HCG, Quant 02/08/2021 84,967*    Color, UA 02/08/2021 Orange     Clarity, UA 02/08/2021 Slightly Cloudy     Specific Gravity, UA 02/08/2021 1 020     pH, UA 02/08/2021 7 0     Leukocytes, UA 02/08/2021 Negative     Nitrite, UA 02/08/2021 Negative     Protein, UA 02/08/2021 Negative     Glucose, UA 02/08/2021 Negative     Ketones, UA 02/08/2021 Negative     Urobilinogen, UA 02/08/2021 0 2     Bilirubin, UA 02/08/2021 Negative     Blood, UA 02/08/2021 Large*    URINE COMMENT 02/08/2021      ABO Grouping 02/08/2021 O     Rh Factor 02/08/2021 Positive     Antibody Screen 02/08/2021 Negative     Specimen Expiration Date 02/08/2021 [id number]     RBC, UA 02/08/2021 20-30*    WBC, UA 02/08/2021 0-1     Epithelial Cells 02/08/2021 None Seen     Bacteria, UA 02/08/2021 None Seen     URINE COMMENT 02/08/2021      Ventricular Rate 02/08/2021 100     Atrial Rate 02/08/2021 100     NM Interval 02/08/2021 130     QRSD Interval 02/08/2021 78     QT Interval 02/08/2021 350     QTC Interval 02/08/2021 451     P Axis 02/08/2021 6     QRS Axis 02/08/2021 -4     T Wave Axis 02/08/2021 20

## 2021-02-11 NOTE — ASSESSMENT & PLAN NOTE
On Lovenox daily  Had self dc'd due to subchorionic hemorrhage and vaginal bleeding  Plan to restart at this time as no additional vaginal bleeding appreciated

## 2021-02-16 ENCOUNTER — TELEMEDICINE (OUTPATIENT)
Dept: OBGYN CLINIC | Facility: MEDICAL CENTER | Age: 36
End: 2021-02-16

## 2021-02-16 DIAGNOSIS — Z34.81 PRENATAL CARE, SUBSEQUENT PREGNANCY, FIRST TRIMESTER: Primary | ICD-10-CM

## 2021-02-16 DIAGNOSIS — D89.82 ALPS (AUTOIMMUNE LYMPHOPROLIFERATIVE SYNDROME) (HCC): ICD-10-CM

## 2021-02-16 PROCEDURE — OBC: Performed by: STUDENT IN AN ORGANIZED HEALTH CARE EDUCATION/TRAINING PROGRAM

## 2021-02-16 NOTE — PROGRESS NOTES
OB INTAKE INTERVIEW Pt presents for OB intake    Whitney Scott is a 38 y o  female [id number] at 9 6/7 weeks gestational age who presents to establish care      Previously seen in [place] ED on 2021 with vaginal bleeding, diagnosed with likely subchorionic hemorrhage     Does have complicated pregnancy history with APLS, bicornuate uterus, history  section x3, history uterine rupture, history IUGR     Ob HX    [id number]  5 AB D+Cx1  SAB x4   39 weeks , induction for bicornuate, velamentous cord insertion, on lovenox for blood clots within placenta and cord, 6 lb 9 oz   39 week  section IUGR, spontaneous labor, fetal distress, 5 lb 15 oz, postpartum hemorrhage 2 L, did not receive blood transfusion (had wanted to breastfeed)   39 weeks, uterine rupture  Had planned for attempted TOLAC  Progressed to 10 cm, noted footling breech at time to deliver with cord visualized, intact  At time of  section noted to have uterine rupture 7lb 8 oz   36 weeks repeat  section, 7lbs, on progesterone   On anticoagulation for all pregnancies: Lovenox   Accompanied by: Telephone  *   *Hx of  delivery prior to 36 weeks 6 days: No  *Last Menstrual Period: Pt's LMP was: Unknown       *Estimated date of delivery:    * Confirmed by: US    *Signs/Symptoms of Pregnancy   *Current pregnancy symptoms: Nausea and vomiting, resolving with meds  Taking Zofran/Diclegis   *Constipation - No   *Headaches - No   *Cramping/spotting - No   *PICA cravings - No    *Diabetes- If you answer YES to 1 of the following, please order 1 hour GTT, 50g    *History of GDM: No    *BMI >35: No    *History of PCOS and/or on Metformin: Yes    *Prior history of LGA/Macrosomia (>9lb):  No      -If you answer YES to 2 or more of the following, please order 1 hour GTT, 50g    *BMI >30: Yes  * First degree relative with type 2 diabetes: Yes    *AMA with other risk factors: No    *History of CHTN, Hyperlipidemia, Elevated A1c: No    *High risk race (, , ,  or [place]): No    *Hypertension- if you answer yes, please order preeclampsia labs including 24 hour urine protein   *Hx of chronic HTN: No   *Hx of gestational HTN: No   *Hx of preeclampsia, eclampsia, or HELLP syndrome: No    *Infection Screening-    *Does the pt have a hx of MRSA?: No    *If yes- please follow MRSA protocol and obtain a nasal swab for MRSA culture   *History of herpes?: No   *Ok for blood transfusion: YES    *Immunizations:   *Influenza vaccine given today:No   *Discussed Tdap vaccine: Yes    *Interview education     *[facility]     *[facility]     *Discussed genetic testing: Yes  Patient had virtual visit with [facility]  See notes     *Appointment at [facility] made: Patient to call      *Routine Prenatal lab work ordered: Yes     *Did patients risk factors prompt additional lab work at this time?: Yes  1 hour Glucose, Baseline preclampsia labs     *Nurse/Family Partnership- pt may qualify:no     *4 P's- substance abuse screening    Presently using? No    Past use? No    Partner using? No    Parents/Family using? No    *Details that I feel the provider should be aware of:  See above  PN1 visit scheduled  The patient was oriented to our practice and all questions were answered  Patient aware [facility]  [facility]  Will  urine collection device for 24 hour year at 2/24 visit      Interviewed by: Kris Kyle

## 2021-02-17 ENCOUNTER — TELEPHONE (OUTPATIENT)
Dept: PERINATAL CARE | Facility: CLINIC | Age: 36
End: 2021-02-17

## 2021-02-17 NOTE — TELEPHONE ENCOUNTER
Called patient to confirm Maternal Fetal Medicine Virtual appointment scheduled for 2/18/21  9:30  KATHY LENTZ  Confirmed she received e-mail and has successfully downloaded the [address]  Explained procedure for virtual visit and requested she log into appointment approximately 10 minutes prior to scheduled start time  Explained the [facility] [address]  will join her at the scheduled appointment time  Patient instructed to call [facility] office @ #[phone] for technical support issues using [employer] TEAMS  Patient verbalized understanding and has no questions at time

## 2021-02-18 ENCOUNTER — TELEMEDICINE (OUTPATIENT)
Dept: PERINATAL CARE | Facility: CLINIC | Age: 36
End: 2021-02-18

## 2021-02-18 DIAGNOSIS — Z31.5 ENCOUNTER FOR PROCREATIVE GENETIC COUNSELING: ICD-10-CM

## 2021-02-18 DIAGNOSIS — O09.529 ANTEPARTUM MULTIGRAVIDA OF ADVANCED MATERNAL AGE: Primary | ICD-10-CM

## 2021-02-18 DIAGNOSIS — O35.2XX0 HEREDITARY DISEASE IN FAMILY POSSIBLY AFFECTING FETUS, AFFECTING MANAGEMENT OF MOTHER IN PREGNANCY, SINGLE OR UNSPECIFIED FETUS: ICD-10-CM

## 2021-02-18 PROCEDURE — NC001 PR NO CHARGE

## 2021-02-19 NOTE — PROGRESS NOTES
Genetic Counseling   High-Risk Gestation Note    Appointment Date:  2/18/2021  Referred By: Mare Diehl MD  YOB: 1985  Partner:  Sandra Squibb  Indication for Visit:  advanced maternal age  Pregnancy History: [id number]  Estimated Date of Delivery: 9/24/21  Estimated Gestational Age: 10w7d      Virtual Regular Visit      Assessment/Plan:    Problem List Items Addressed This Visit     None      Visit Diagnoses     Antepartum multigravida of advanced maternal age    -  Primary    Hereditary disease in family possibly affecting fetus, affecting management of mother in pregnancy, single or unspecified fetus        Encounter for procreative genetic counseling                   Reason for visit is   Chief Complaint   Patient presents with    Virtual Regular Visit        Encounter provider Jerry Chapa    Provider located at [address] [place] [id number] [phone]      Recent Visits  No visits were found meeting these conditions  Showing recent visits within past 7 days and meeting all other requirements     Future Appointments  No visits were found meeting these conditions  Showing future appointments within next 150 days and meeting all other requirements        The patient was identified by name and date of birth  Andrae Graft was informed that this is a telemedicine visit and that the visit is being conducted through [employer] and patient was informed that this is a secure, HIPAA-compliant platform  She agrees to proceed     My office door was closed  No one else was in the room  She acknowledged consent and understanding of privacy and security of the video platform  The patient has agreed to participate and understands they can discontinue the visit at any time  Patient is aware this is a billable service       Viviana Honag is a 38 y o  female who presented for genetic counseling to discuss age related risks for chromosome abnormalities  The patient has a complex medical history and bicornate (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 which will be discussed during her maternal fetal medicine consultation  We reviewed that the risk of Down syndrome at age 39 at delivery is 1/217, and the risk for any chromosomal abnormality at this age is 1/123  The risks, benefits, and limitations of amniocentesis were discussed with the patient  Amniocentesis is performed under direct real time ultrasound visualization to avoid both the fetus and the placenta  Once amniotic fluid is withdrawn, laboratory analysis is performed and amniotic fluid alpha-fetoprotein, as well as chromosome and/or microarray analysis is undertaken  The risk of genetic amniocentesis includes, but is not limited to less than 1 in 300 pregnancy loss rate or  delivery rate if 23 weeks or greater, infection, bleeding, rupture of membranes, failure of cells to grow, karyotype error, laboratory error, etc   Occasionally a repeat amniocentesis is necessary due to cell culture failure  Chromosome/microarray analysis from amniocentesis is 99 9% accurate and alpha-fetoprotein analysis can detect approximately 95% of open neural tube defects  Chorionic villus sampling (CVS) is another diagnostic testing option that is available earlier than amniocentesis, between 10-14 weeks gestation  Like amniocentesis, CVS is 99% accurate for detecting chromosomal problems  Unlike amniocentesis, CVS cannot detect alpha-fetoprotein levels in order to determine the risk for open neural tube defects  MSAFP testing would need to be performed at 15-20 weeks gestation for this purpose  The risk of CVS includes, but is not limited to, less than a 1 in 300 risk for pregnancy loss  There is also a 1% risk for maternal cell contamination and cell culture failure, in which case the CVS would need to be followed-up with amniocentesis      We reviewed the testing option of cell free fetal DNA screening (also known as noninvasive prenatal testing or NIPT)  We discussed that it is a serum test to identify fragments of fetal DNA in maternal blood  We reviewed the benefits and limitations of cell free fetal DNA screening in detecting Down syndrome, Trisomy 13, Trisomy 25 and sex chromosome aneuploidies  We also discussed that cell free fetal DNA screening does not detect additional chromosomal abnormalities and the possibility of a failed test result  As cell free fetal DNA screening does not detect open neural tube defects, MSAFP screening is available at 15-20 weeks gestation  We discussed the availability of an ultrasound between 11-14 weeks gestation to measure the nuchal translucency (NT), which can assess for chromosome abnormalities, cardiac defects, and other adverse pregnancy outcomes  We reviewed that level II anatomy ultrasound is typically performed at approximately 20 weeks gestation  Level II ultrasound evaluation is between 60-80% accurate in detecting major physical birth defects and variations in fetal development that may be associated with chromosome abnormalities  Level II ultrasound evaluation is not able to detect all birth defects or health problems  After discussing the available prenatal screening and testing options Mili Dumont elected to pursue cell free fetal DNA screening  She was informed that the results will disclose the fetal sex and will be available in her MyChart to review  The patient will  a [address] lab slip and kit at our [facility] location on 2/24/21, to be drawn after 10 weeks gestation  Results take approximately 7-10 days  The patient declined CVS and amniocentesis secondary to procedural related complications    She may reconsider diagnostic testing should the cell free fetal DNA screening come back abnormal   Mili Dumont is also planning on pursuing NT ultrasound, MSAFP screening and Level II ultrasound at the appropriate times  Histories for the patient and her partner's family were taken during our session  Lore's oldest son had a hemangioma from his ocular cavity out to his temple  He was followed by [facility] and the hemangioma resolved with propranalol  Her 5year old son had IUGR during her pregnancy but is normal weight and height for his age  Etiology for the IUGR was not fully determined  The patient's 9year old son has a "lazy eye" which reportedly will need surgical correction  Bryant Vallecillo states that none of her sons have significant learning differences and there was never a concern for a genetic syndrome or condition by any of their doctors  Bryant Vallecillo states that her cousin had a baby with Trisomy 25  We reviewed that this history does not increase the risk over the patient's age related risk for chromosome abnormalities  Further review of family history for the patient and her partner was noncontributory  The family history was not significant for other genetic diseases or disorders, intellectual disability, birth defects, fetal loss, or consanguinity  Patient reports being of [place] citizen/[place]/ descent and that her  is of [place]/  descent  She denies either of them having known Ashkenazi Mu-ism ancestry  The benefits and limitations of Cystic fibrosis (CF), Spinal muscular atrophy (SMA), hemoglobinopathy, Fragile X and expanded carrier screening was discussed  The patient declined expanded carrier screening  The patient was unsure of pursuing the other carrier screening at this time and elected to further consider the options  Should she decide to have any of the blood work performed she will contact our office      Lastly, we discussed the fact that everyone in the general population regardless of age, family history, or medical background has approximately a 3-5% risk of having a child with some type of congenital anomaly, genetic disease or intellectual disability  Currently there are no tests available to rule out all birth defects or health problems  Trinidad Martinez was provided with our contact information  I encouraged her to call with any questions or concerns  Plan/Tests Ordered:  1) Patient declined CVS, amniocentesis, and expanded carrier screening  2) Patient elected NIPT - will  Ashlee Samson and kit at [facility] office on 21  3) NT ultrasound scheduled for 3/25/21  4) MSAFP screening at 15-20 weeks gestation  5) Level II anatomy ultrasound at approximately 20 weeks gestation          HPI     Past Medical History:   Diagnosis Date    Anemia     Antiphospholipid antibody syndrome (HCC) not on AC     Antiphospholipid syndrome during pregnancy ([facility] [address] )     Asthma     Endometriosis     Fibromyalgia     Fibromyalgia, primary     Polycystic ovary syndrome     with insulin resistence    Sjogren's disease ([facility] [address] )        Past Surgical History:   Procedure Laterality Date    APPENDECTOMY           SECTION      C/S x 3    LAPAROSCOPY AND HYSTEROSCOPY      Reproductive Medicine/Recurrent losses    [place] LAP,APPENDECTOMY N/A 2019    Procedure: APPENDECTOMY LAPAROSCOPIC;  Surgeon: Bedelia Simmonds, MD;  Location: [facility];  Service: General       Current Outpatient Medications   Medication Sig Dispense Refill    acetaminophen (TYLENOL) 325 mg tablet Take 2 tablets (650 mg total) by mouth every 6 (six) hours as needed for mild pain 30 tablet 0    albuterol (2 5 mg/3 mL) 0 083 % nebulizer solution Take 1 vial (2 5 mg total) by nebulization every 4 (four) hours as needed for wheezing or shortness of breath (Patient not taking: Reported on 2021) 75 mL 0    Doxylamine-Pyridoxine 10-10 MG TBEC Take 25 mg by mouth every 6 (six) hours 120 tablet 0    enoxaparin (LOVENOX) 40 mg/0 4 mL Inject 0 4 mL (40 mg total) under the skin daily 48 mL 0    hydroxychloroquine (Plaquenil) 200 mg tablet Take 2 tablets (400 mg total) by mouth daily with breakfast (Patient not taking: Reported on 2/11/2021) 60 tablet 1    metoclopramide (REGLAN) 10 mg tablet Take 1 tablet (10 mg total) by mouth every 6 (six) hours 30 tablet 0    NALTREXONE HCL PO Take 9 mg by mouth      ondansetron (ZOFRAN-ODT) 4 mg disintegrating tablet Take 1 tablet (4 mg total) by mouth every 8 (eight) hours as needed for nausea or vomiting 20 tablet 0    Prenatal MV-Min-Fe Fum-FA-DHA (Prenatal Multivitamin Plus DHA) 27-0 8-250 MG CAPS Take 1 tablet by mouth daily 120 capsule 0    progesterone (PROMETRIUM) 200 MG capsule Take 1 capsule (200 mg total) by mouth daily 30 capsule 0    TiZANidine (ZANAFLEX) 2 MG capsule        No current facility-administered medications for this visit  Allergies   Allergen Reactions    Valacyclovir Itching and Swelling       Review of Systems    Video Exam    There were no vitals filed for this visit  Physical Exam     I spent 90 minutes directly with the patient during this visit      VIRTUAL VISIT DISCLAIMER    Ivonne Stark acknowledges that she has consented to an online visit or consultation  She understands that the online visit is based solely on information provided by her, and that, in the absence of a face-to-face physical evaluation by the physician, the diagnosis she receives is both limited and provisional in terms of accuracy and completeness  This is not intended to replace a full medical face-to-face evaluation by the physician  Ivonne Stark understands and accepts these terms

## 2021-02-24 ENCOUNTER — INITIAL PRENATAL (OUTPATIENT)
Dept: OBGYN CLINIC | Facility: CLINIC | Age: 36
End: 2021-02-24

## 2021-02-24 VITALS — BODY MASS INDEX: 39.38 KG/M2 | SYSTOLIC BLOOD PRESSURE: 114 MMHG | DIASTOLIC BLOOD PRESSURE: 68 MMHG | WEIGHT: 208.4 LBS

## 2021-02-24 DIAGNOSIS — O09.91 HIGH-RISK PREGNANCY IN FIRST TRIMESTER: ICD-10-CM

## 2021-02-24 DIAGNOSIS — Z01.419 WOMEN'S ANNUAL ROUTINE GYNECOLOGICAL EXAMINATION: ICD-10-CM

## 2021-02-24 DIAGNOSIS — O34.01 BICORNATE UTERUS COMPLICATING PREGNANCY, FIRST TRIMESTER: ICD-10-CM

## 2021-02-24 DIAGNOSIS — Z34.81 PRENATAL CARE, SUBSEQUENT PREGNANCY, FIRST TRIMESTER: Primary | ICD-10-CM

## 2021-02-24 DIAGNOSIS — Z98.891 HISTORY OF CESAREAN SECTION: ICD-10-CM

## 2021-02-24 DIAGNOSIS — D68.61 ANTIPHOSPHOLIPID SYNDROME DURING PREGNANCY (HCC): ICD-10-CM

## 2021-02-24 DIAGNOSIS — Q51.3 BICORNATE UTERUS COMPLICATING PREGNANCY, FIRST TRIMESTER: ICD-10-CM

## 2021-02-24 DIAGNOSIS — Z23 NEED FOR INFLUENZA VACCINATION: ICD-10-CM

## 2021-02-24 DIAGNOSIS — Z30.2 REQUEST FOR STERILIZATION: ICD-10-CM

## 2021-02-24 DIAGNOSIS — O99.119 ANTIPHOSPHOLIPID SYNDROME DURING PREGNANCY (HCC): ICD-10-CM

## 2021-02-24 DIAGNOSIS — Z11.3 SCREENING FOR STD (SEXUALLY TRANSMITTED DISEASE): ICD-10-CM

## 2021-02-24 LAB
SL AMB  POCT GLUCOSE, UA: NORMAL
SL AMB POCT URINE PROTEIN: NORMAL

## 2021-02-24 PROCEDURE — 87491 CHLMYD TRACH DNA AMP PROBE: CPT | Performed by: STUDENT IN AN ORGANIZED HEALTH CARE EDUCATION/TRAINING PROGRAM

## 2021-02-24 PROCEDURE — PNV: Performed by: STUDENT IN AN ORGANIZED HEALTH CARE EDUCATION/TRAINING PROGRAM

## 2021-02-24 PROCEDURE — 87591 N.GONORRHOEAE DNA AMP PROB: CPT | Performed by: STUDENT IN AN ORGANIZED HEALTH CARE EDUCATION/TRAINING PROGRAM

## 2021-02-24 RX ORDER — LORATADINE 10 MG/1
10 TABLET ORAL DAILY
COMMUNITY
End: 2021-09-30

## 2021-02-24 NOTE — PROGRESS NOTES
First PN visit  O pos  Labs not completed yet  24 hr urine given to her by [facility]  Last pap: 11/19/15-normal  Gave blue folder  GC/CH collected today  Declined flu vaccine  No vomiting but does have nausea  Had some significant VB last night- brown discharge and this morning is pink

## 2021-02-24 NOTE — PROGRESS NOTES
Initial Prenatal Appointment - Obstetrics   John Costello 39 y o  female MRN: [id number]  227 M  [facility]  Encounter: [id number]      Assessment/Plan     39 y o  [id number] 9w5d for initial prenatal appointment  Problem List Items Addressed This Visit        Hematopoietic and Hemostatic    Antiphospholipid syndrome during pregnancy ([address] )     Continue lovenox daily            Genitourinary    Bicornate uterus complicating pregnancy, first trimester     Again appreciated on ultrasound today         Uterine rupture during labor     At time of TOLAC  Aware plan for repeat  at 39 weeks            Other    High-risk pregnancy in first trimester    History of  section     X3, history of uterine rupture at time of  after TOLAC, noted to be breech intrapartum         Request for sterilization     Does desire salpingectomy at time of repeat  section           Other Visit Diagnoses     Prenatal care, subsequent pregnancy, first trimester    -  Primary    Relevant Orders    POCT urine dip (Completed)    Need for influenza vaccination        Screening for STD (sexually transmitted disease)        Relevant Orders    Chlamydia/GC amplified DNA by PCR    Women's annual routine gynecological examination             [facility] appointment as scheduled, appreciate reccs    Reviewed possibility of baby ASA for preE prophylaxis, will consider  Again reviewed unfortunately no cure for subchorionic hemorrhage, plan to mionitor closely, will call back if increased bleeding, old blood only on exam today    Plan for early 1 hour glucose - had previously been on metformin in previous pregnancies, reviewed first line in pregnancy insulin  Appreciate [facility] reccs        For routine OB follow up   ------------    CC: initial prenatal appointment    History of Present Illness     John Costello is a 39 y o  female [id number] at 9w5d Estimated Date of Delivery: 21 by 6 /7 week ultrasound on 21, not consistent with LMP (No LMP recorded (lmp unknown)  Patient is pregnant  )  She presents for her initial prenatal appointment  She is unaccompanied  Concerns today: vaginal bleeding has decreased significantly  Has started Lovenox since our last appointment, doing well      OB History        10    Para   4    Term   3       1    AB   5    Living   4       SAB   5    TAB   0    Ectopic   0    Multiple   0    Live Births   4               # 1 - Date: 03, Sex: None, Weight: None, GA: 15w0d, Delivery: None, Apgar1: None, Apgar5: None, Living: None, Birth Comments: None    # 2 - Date: , Sex: None, Weight: None, GA: 10w0d, Delivery: Spontaneous , Apgar1: None, Apgar5: None, Living: None, Birth Comments: None    # 3 - Date: , Sex: None, Weight: None, GA: 7w0d, Delivery: Spontaneous , Apgar1: None, Apgar5: None, Living: None, Birth Comments: None    # 4 - Date: 01/14/10, Sex: Male, Weight: 2977 g (6 lb 9 oz), GA: 39w0d, Delivery: Vaginal, Spontaneous, Apgar1: None, Apgar5: None, Living: Living, Birth Comments: Describes delivery as "Truamatic"    # 5 - Date: 2010, Sex: None, Weight: None, GA: 6w0d, Delivery: None, Apgar1: None, Apgar5: None, Living: None, Birth Comments: None    # 6 - Date: 11, Sex: Male, Weight: 2693 g (5 lb 15 oz), GA: 39w0d, Delivery: , Low Transverse, Apgar1: None, Apgar5: None, Living: Living, Birth Comments: Fetal intolerance/decels/General Anes    # 7 - Date: , Sex: None, Weight: None, GA: 8w0d, Delivery: None, Apgar1: None, Apgar5: None, Living: None, Birth Comments: None    # 8 - Date: 13, Sex: Male, Weight: 3402 g (7 lb 8 oz), GA: 39w0d, Delivery: , Low Transverse, Apgar1: None, Apgar5: None, Living: Living, Birth Comments: Uterine Rupture    # 9 - Date: 16, Sex: Male, Weight: 3180 g (7 lb 0 2 oz), GA: 36w3d, Delivery: , Low Transverse, Apgar1: 8, Apgar5: 9, Living: Living, Birth Comments: Prenatal:Received prenatal care: yesMaternal Blood Type O positive   Antibody negativeMaternal hepatitis B surface antigen status: negativeMaternal HIV status: unknownMaternal RPR not doneMaternal Rubella not doneMaternal Group B strep: negativeMaternal STDs: chlamydia (neg) and gonorrhea (neg)Hepatitis C Antibody negativeMaternal drug screen: not done    # 10 - Date: None, Sex: None, Weight: None, GA: None, Delivery: None, Apgar1: None, Apgar5: None, Living: None, Birth Comments: None    Bicornuate uterus  [id number]     5 AB (visualized heartbeat)  D+Cx1  SAB x4      39 weeks , induction for bicornuate, velamentous cord insertion, on lovenox for blood clots within placenta and cord, 6 lb 9 oz   39 week  section IUGR, spontaneous labor, fetal distress, 5 lb 15 oz, postpartum hemorrhage 2 L, did not receive blood transfusion (had wanted to breastfeed)   39 weeks, uterine rupture  Had planned for attempted TOLAC  Progressed to 10 cm, noted footling breech at time to deliver with cord visualized, intact  At time of  section noted to have uterine rupture 7lb 8 oz   36 weeks repeat  section, 7lbs, on progesterone     On anticoagulation for all pregnancies (heparin vs Lovenox)      GYN History  No LMP recorded (lmp unknown)  Patient is pregnant    No history abnormal Pap smears - last Pap smear >5 years ago per patient, normal  No history of cryotherapy, LEEP, or cold knife cone of the cervix  No history STD (gonorrhea, chlamydia, herpes)    Past Medical History:   Diagnosis Date    Anemia     Antiphospholipid antibody syndrome (HCC) not on AC     [facility]ospholipid syndrome during pregnancy ([facility] )     Asthma     Endometriosis     Fibromyalgia     Fibromyalgia, primary     Polycystic ovary syndrome     with insulin resistence    Sjogren's disease ([facility] )      Past Surgical History:   Procedure Laterality Date    APPENDECTOMY      2019     SECTION      C/S x 3    LAPAROSCOPY AND HYSTEROSCOPY      Reproductive Medicine/Recurrent losses    [place] LAP,APPENDECTOMY N/A 12/31/2019    Procedure: APPENDECTOMY LAPAROSCOPIC;  Surgeon: Devang Mims MD;  Location: [facility];  Service: General       No alcohol, tobacco, illicit drug use        Current Outpatient Medications:     acetaminophen (TYLENOL) 325 mg tablet, Take 2 tablets (650 mg total) by mouth every 6 (six) hours as needed for mild pain, Disp: 30 tablet, Rfl: 0    albuterol (2 5 mg/3 mL) 0 083 % nebulizer solution, Take 1 vial (2 5 mg total) by nebulization every 4 (four) hours as needed for wheezing or shortness of breath, Disp: 75 mL, Rfl: 0    Doxylamine-Pyridoxine 10-10 MG TBEC, Take 25 mg by mouth every 6 (six) hours, Disp: 120 tablet, Rfl: 0    enoxaparin (LOVENOX) 40 mg/0 4 mL, Inject 0 4 mL (40 mg total) under the skin daily, Disp: 48 mL, Rfl: 0    loratadine (CLARITIN) 10 mg tablet, Take 10 mg by mouth daily, Disp: , Rfl:     metoclopramide (REGLAN) 10 mg tablet, Take 1 tablet (10 mg total) by mouth every 6 (six) hours, Disp: 30 tablet, Rfl: 0    NALTREXONE HCL PO, Take 9 mg by mouth, Disp: , Rfl:     ondansetron (ZOFRAN-ODT) 4 mg disintegrating tablet, Take 1 tablet (4 mg total) by mouth every 8 (eight) hours as needed for nausea or vomiting, Disp: 20 tablet, Rfl: 0    Prenatal MV-Min-Fe Fum-FA-DHA (Prenatal Multivitamin Plus DHA) 27-0 8-250 MG CAPS, Take 1 tablet by mouth daily, Disp: 120 capsule, Rfl: 0    progesterone (PROMETRIUM) 200 MG capsule, Take 1 capsule (200 mg total) by mouth daily, Disp: 30 capsule, Rfl: 0    TiZANidine (ZANAFLEX) 2 MG capsule, , Disp: , Rfl:     hydroxychloroquine (Plaquenil) 200 mg tablet, Take 2 tablets (400 mg total) by mouth daily with breakfast (Patient not taking: Reported on 2/11/2021), Disp: 60 tablet, Rfl: 1  Allergies   Allergen Reactions    Valacyclovir Itching and Swelling     Treated for shingles and 24years of age       Objective Vitals: Blood pressure 114/68, weight 94 5 kg (208 lb 6 4 oz), not currently breastfeeding  Body mass index is 39 38 kg/m²  General: NAD, AAOx3  Neck: supple, no thyromegaly or thyroid nodules appreciated  Heart: RRR  Lungs: CTAB  Breast: nipples everted bilaterally, no skin changes  No dimpling, redness, or erythema  Abdomen: soft, non-distended, non tender to palpation  Extremities: non-tender to palpation  Speculum exam: Normal appearing external genitalia, normal hair distribution  Urethra well-suspended, no clitoromegaly noted  Vagina pink, moist, well-rugated  Physiologic discharge noted  Cervix without lesion, parous appearing  not dilated  no blood in vaginal vault    Pelvic exam: No cervical motion tenderness  No adnexal masses or tenderness  Fetal heart rate 174 bpm by M mode      Lab Results:   Initial Prenatal on 02/24/2021   Component Date Value    POCT URINE PROTEIN 02/24/2021 neg     GLUCOSE, UA 02/24/2021 neg

## 2021-02-25 LAB
C TRACH DNA SPEC QL NAA+PROBE: NEGATIVE
N GONORRHOEA DNA SPEC QL NAA+PROBE: NEGATIVE

## 2021-03-10 ENCOUNTER — APPOINTMENT (OUTPATIENT)
Dept: LAB | Facility: HOSPITAL | Age: 36
End: 2021-03-10
Attending: STUDENT IN AN ORGANIZED HEALTH CARE EDUCATION/TRAINING PROGRAM
Payer: COMMERCIAL

## 2021-03-10 ENCOUNTER — TELEPHONE (OUTPATIENT)
Dept: OBGYN CLINIC | Facility: CLINIC | Age: 36
End: 2021-03-10

## 2021-03-10 ENCOUNTER — TRANSCRIBE ORDERS (OUTPATIENT)
Dept: ADMINISTRATIVE | Facility: HOSPITAL | Age: 36
End: 2021-03-10

## 2021-03-10 DIAGNOSIS — O09.529 ELDERLY MULTIGRAVIDA WITH ANTEPARTUM CONDITION OR COMPLICATION: ICD-10-CM

## 2021-03-10 DIAGNOSIS — Z34.81 PRENATAL CARE, SUBSEQUENT PREGNANCY, FIRST TRIMESTER: ICD-10-CM

## 2021-03-10 DIAGNOSIS — O09.529 ELDERLY MULTIGRAVIDA WITH ANTEPARTUM CONDITION OR COMPLICATION: Primary | ICD-10-CM

## 2021-03-10 DIAGNOSIS — D89.82 ALPS (AUTOIMMUNE LYMPHOPROLIFERATIVE SYNDROME) (HCC): ICD-10-CM

## 2021-03-10 LAB
ABO GROUP BLD: NORMAL
ALBUMIN SERPL BCP-MCNC: 3.3 G/DL (ref 3.5–5)
ALP SERPL-CCNC: 58 U/L (ref 46–116)
ALT SERPL W P-5'-P-CCNC: 95 U/L (ref 12–78)
ANION GAP SERPL CALCULATED.3IONS-SCNC: 11 MMOL/L (ref 4–13)
AST SERPL W P-5'-P-CCNC: 21 U/L (ref 5–45)
BACTERIA UR QL AUTO: NORMAL /HPF
BASOPHILS # BLD AUTO: 0.02 THOUSANDS/ΜL (ref 0–0.1)
BASOPHILS NFR BLD AUTO: 0 % (ref 0–1)
BILIRUB SERPL-MCNC: 0.26 MG/DL (ref 0.2–1)
BILIRUB UR QL STRIP: NEGATIVE
BLD GP AB SCN SERPL QL: NEGATIVE
BUN SERPL-MCNC: 6 MG/DL (ref 5–25)
CALCIUM ALBUM COR SERPL-MCNC: 9.5 MG/DL (ref 8.3–10.1)
CALCIUM SERPL-MCNC: 8.9 MG/DL (ref 8.3–10.1)
CHLORIDE SERPL-SCNC: 102 MMOL/L (ref 100–108)
CLARITY UR: CLEAR
CO2 SERPL-SCNC: 26 MMOL/L (ref 21–32)
COLOR UR: NORMAL
CREAT SERPL-MCNC: 0.57 MG/DL (ref 0.6–1.3)
EOSINOPHIL # BLD AUTO: 0.5 THOUSAND/ΜL (ref 0–0.61)
EOSINOPHIL NFR BLD AUTO: 7 % (ref 0–6)
ERYTHROCYTE [DISTWIDTH] IN BLOOD BY AUTOMATED COUNT: 12.9 % (ref 11.6–15.1)
GFR SERPL CREATININE-BSD FRML MDRD: 119 ML/MIN/1.73SQ M
GLUCOSE 1H P 50 G GLC PO SERPL-MCNC: 170 MG/DL
GLUCOSE P FAST SERPL-MCNC: 174 MG/DL (ref 65–99)
GLUCOSE UR STRIP-MCNC: NEGATIVE MG/DL
HCT VFR BLD AUTO: 38 % (ref 34.8–46.1)
HGB BLD-MCNC: 13.1 G/DL (ref 11.5–15.4)
HGB UR QL STRIP.AUTO: NEGATIVE
IMM GRANULOCYTES # BLD AUTO: 0.07 THOUSAND/UL (ref 0–0.2)
IMM GRANULOCYTES NFR BLD AUTO: 1 % (ref 0–2)
KETONES UR STRIP-MCNC: NEGATIVE MG/DL
LEUKOCYTE ESTERASE UR QL STRIP: NEGATIVE
LYMPHOCYTES # BLD AUTO: 1.74 THOUSANDS/ΜL (ref 0.6–4.47)
LYMPHOCYTES NFR BLD AUTO: 23 % (ref 14–44)
MCH RBC QN AUTO: 31.2 PG (ref 26.8–34.3)
MCHC RBC AUTO-ENTMCNC: 34.5 G/DL (ref 31.4–37.4)
MCV RBC AUTO: 91 FL (ref 82–98)
MONOCYTES # BLD AUTO: 0.34 THOUSAND/ΜL (ref 0.17–1.22)
MONOCYTES NFR BLD AUTO: 5 % (ref 4–12)
NEUTROPHILS # BLD AUTO: 4.79 THOUSANDS/ΜL (ref 1.85–7.62)
NEUTS SEG NFR BLD AUTO: 64 % (ref 43–75)
NITRITE UR QL STRIP: NEGATIVE
NON-SQ EPI CELLS URNS QL MICRO: NORMAL /HPF
NRBC BLD AUTO-RTO: 0 /100 WBCS
PH UR STRIP.AUTO: 6 [PH]
PLATELET # BLD AUTO: 262 THOUSANDS/UL (ref 149–390)
PMV BLD AUTO: 9.3 FL (ref 8.9–12.7)
POTASSIUM SERPL-SCNC: 3.3 MMOL/L (ref 3.5–5.3)
PROT SERPL-MCNC: 7 G/DL (ref 6.4–8.2)
PROT UR STRIP-MCNC: NEGATIVE MG/DL
RBC # BLD AUTO: 4.2 MILLION/UL (ref 3.81–5.12)
RBC #/AREA URNS AUTO: NORMAL /HPF
RH BLD: POSITIVE
SODIUM SERPL-SCNC: 139 MMOL/L (ref 136–145)
SP GR UR STRIP.AUTO: 1.01 (ref 1–1.03)
SPECIMEN EXPIRATION DATE: NORMAL
UROBILINOGEN UR QL STRIP.AUTO: 0.2 E.U./DL
WBC # BLD AUTO: 7.46 THOUSAND/UL (ref 4.31–10.16)
WBC #/AREA URNS AUTO: NORMAL /HPF

## 2021-03-10 PROCEDURE — 80053 COMPREHEN METABOLIC PANEL: CPT

## 2021-03-10 PROCEDURE — 84156 ASSAY OF PROTEIN URINE: CPT

## 2021-03-10 PROCEDURE — 87086 URINE CULTURE/COLONY COUNT: CPT

## 2021-03-10 PROCEDURE — 36415 COLL VENOUS BLD VENIPUNCTURE: CPT

## 2021-03-10 PROCEDURE — 82950 GLUCOSE TEST: CPT

## 2021-03-10 PROCEDURE — 80081 OBSTETRIC PANEL INC HIV TSTG: CPT

## 2021-03-10 PROCEDURE — 81001 URINALYSIS AUTO W/SCOPE: CPT

## 2021-03-10 NOTE — TELEPHONE ENCOUNTER
Per communication consent, LMOM in regards to lab results and blood type         ----- Message from Bethany Kapoor MD sent at 3/10/2021  3:10 PM EST -----  Please let Woodward Goodell know that her prenatal labs were all normal, thank you - blood type O+

## 2021-03-11 LAB
BACTERIA UR CULT: NORMAL
HBV SURFACE AG SER QL: NORMAL
HIV 1+2 AB+HIV1 P24 AG SERPL QL IA: NORMAL
PROT 24H UR-MCNC: 160.5 MG/24 HRS (ref 40–150)
RPR SER QL: NORMAL
RUBV IGG SERPL IA-ACNC: >175 IU/ML
SPECIMEN VOL UR: 2675 ML

## 2021-03-15 ENCOUNTER — TELEPHONE (OUTPATIENT)
Dept: PERINATAL CARE | Facility: CLINIC | Age: 36
End: 2021-03-15

## 2021-03-15 NOTE — TELEPHONE ENCOUNTER
I left a message for Lore to notify her of the low risk result of her MaterniT 21  I also instructed her to call the nurse line at [phone] today or [phone] tomorrow if she wants to know the sex of the baby  I informed her that her OB office should be ordering her MSAFP at her next visit

## 2021-03-15 NOTE — TELEPHONE ENCOUNTER
----- Message from Crissy Moreira MD sent at 3/15/2021 10:43 AM EDT -----  I reviewed the lab study today and the results revealed low risks for trisomy 21, 25, 15, and sex chromosome aneuploidies

## 2021-03-25 ENCOUNTER — TELEPHONE (OUTPATIENT)
Dept: OBGYN CLINIC | Age: 36
End: 2021-03-25

## 2021-03-25 ENCOUNTER — ROUTINE PRENATAL (OUTPATIENT)
Dept: PERINATAL CARE | Facility: OTHER | Age: 36
End: 2021-03-25
Payer: COMMERCIAL

## 2021-03-25 ENCOUNTER — ROUTINE PRENATAL (OUTPATIENT)
Dept: OBGYN CLINIC | Facility: CLINIC | Age: 36
End: 2021-03-25

## 2021-03-25 VITALS
HEART RATE: 84 BPM | WEIGHT: 210 LBS | BODY MASS INDEX: 39.65 KG/M2 | SYSTOLIC BLOOD PRESSURE: 110 MMHG | HEIGHT: 61 IN | DIASTOLIC BLOOD PRESSURE: 70 MMHG

## 2021-03-25 VITALS
WEIGHT: 209.44 LBS | DIASTOLIC BLOOD PRESSURE: 69 MMHG | HEIGHT: 61 IN | HEART RATE: 80 BPM | SYSTOLIC BLOOD PRESSURE: 103 MMHG | BODY MASS INDEX: 39.54 KG/M2

## 2021-03-25 DIAGNOSIS — IMO0002: ICD-10-CM

## 2021-03-25 DIAGNOSIS — O34.219 HISTORY OF CESAREAN DELIVERY AFFECTING PREGNANCY: ICD-10-CM

## 2021-03-25 DIAGNOSIS — Q51.3 BICORNATE UTERUS COMPLICATING PREGNANCY, FIRST TRIMESTER: ICD-10-CM

## 2021-03-25 DIAGNOSIS — O09.91 HIGH-RISK PREGNANCY IN FIRST TRIMESTER: ICD-10-CM

## 2021-03-25 DIAGNOSIS — Z30.2 REQUEST FOR STERILIZATION: ICD-10-CM

## 2021-03-25 DIAGNOSIS — Z34.90 PREGNANCY AT EARLY STAGE: ICD-10-CM

## 2021-03-25 DIAGNOSIS — Z34.92 PREGNANCY, OBSTETRICAL CARE, SECOND TRIMESTER: ICD-10-CM

## 2021-03-25 DIAGNOSIS — Z34.81 PRENATAL CARE, SUBSEQUENT PREGNANCY, FIRST TRIMESTER: Primary | ICD-10-CM

## 2021-03-25 DIAGNOSIS — O99.810 ABNORMAL GLUCOSE AFFECTING PREGNANCY: ICD-10-CM

## 2021-03-25 DIAGNOSIS — D68.61 ANTIPHOSPHOLIPID SYNDROME DURING PREGNANCY (HCC): ICD-10-CM

## 2021-03-25 DIAGNOSIS — E74.39 GLUCOSE INTOLERANCE: ICD-10-CM

## 2021-03-25 DIAGNOSIS — O34.01 BICORNATE UTERUS COMPLICATING PREGNANCY, FIRST TRIMESTER: ICD-10-CM

## 2021-03-25 DIAGNOSIS — E66.9 OBESITY (BMI 30-39.9): ICD-10-CM

## 2021-03-25 DIAGNOSIS — Z36.9 ENCOUNTER FOR ANTENATAL SCREENING: ICD-10-CM

## 2021-03-25 DIAGNOSIS — O99.119 ANTIPHOSPHOLIPID SYNDROME DURING PREGNANCY (HCC): ICD-10-CM

## 2021-03-25 DIAGNOSIS — Z3A.13 13 WEEKS GESTATION OF PREGNANCY: Primary | ICD-10-CM

## 2021-03-25 PROBLEM — M25.50 ARTHRALGIA: Status: RESOLVED | Noted: 2018-03-22 | Resolved: 2021-03-25

## 2021-03-25 PROBLEM — O09.299 HISTORY OF INTRAUTERINE GROWTH RESTRICTION IN PRIOR PREGNANCY, CURRENTLY PREGNANT: Status: ACTIVE | Noted: 2021-03-25

## 2021-03-25 LAB
SL AMB  POCT GLUCOSE, UA: NORMAL
SL AMB POCT URINE PROTEIN: NORMAL

## 2021-03-25 PROCEDURE — 76801 OB US < 14 WKS SINGLE FETUS: CPT | Performed by: OBSTETRICS & GYNECOLOGY

## 2021-03-25 PROCEDURE — PNV: Performed by: STUDENT IN AN ORGANIZED HEALTH CARE EDUCATION/TRAINING PROGRAM

## 2021-03-25 PROCEDURE — 99243 OFF/OP CNSLTJ NEW/EST LOW 30: CPT | Performed by: OBSTETRICS & GYNECOLOGY

## 2021-03-25 RX ORDER — ASPIRIN 81 MG/1
162 TABLET ORAL DAILY
Qty: 60 TABLET | Refills: 5 | Status: SHIPPED | OUTPATIENT
Start: 2021-03-25 | End: 2021-08-05

## 2021-03-25 NOTE — ASSESSMENT & PLAN NOTE
40 yo  at 13+6 here for routine ob visit  Came from [facility] where she had US- too large for NT but images reassuring  No contractions, no leaking, no bleeding  Feeling some movement already! Reviewed new ob labs  ALT mildly elevated and repeat CMP ordered with 3 hour as well as AFP  Return in 4 wks

## 2021-03-25 NOTE — ASSESSMENT & PLAN NOTE
Will plan repeat at 36 wks  Would likely be 8/27- not requested yet- will wait until early third trimester  Discussed likely AM or CS based on Friday and patient would like to know and meet her surgeon prior to scheduled delivery

## 2021-03-25 NOTE — PROGRESS NOTES
[address] [place], [place] [address]: Ms Iker Cheung was seen today at 13w6d for nuchal translucency ultrasound  See ultrasound report under "OB Procedures" tab  My recommendations are as follows:  1  We reviewed the availability of genetic screening, as well as diagnostic testing, which are available to all pregnant women  She already completed NIPT which resulted low risk, which we reviewed  She does not wish to pursue diagnostic testing at this time  A detailed anatomic survey as well as transvaginal cervical length screening are recommended between 18-22 weeks gestation  MSAFP screening is recommended at 16-18 weeks gestation, to be ordered and followed through your office  I recommend 150 minutes of exercise per week, and goal gestational weight gain of no more than 11-20 pounds  2  We discussed her history of uterine rupture and recommendations regarding management this pregnancy  I would recommend management as in her most recent pregnancy, with planned repeat  delivery at 36 weeks gestation, and a low threshold for delivery sooner if she has signs or symptoms of labor   corticosteroids should be considered since delivery is planned prior to 37 weeks gestation  Antepartum fetal surveillance will be initiated at 32 weeks gestation based on antiphospholipid syndrome  This pregnancy was a surprise and conceived while using contraception  She is planning bilateral salpingectomy at time of  delivery  3  Based on her history of antiphospholipid syndrome, following fetal anatomic survey, I recommend serial evaluation of fetal growth every 4-6 weeks  Twice weekly antepartum fetal surveillance is recommended at 32 weeks gestation  Delivery will be as indicated by prior uterine rupture (see #2)  I agree with continuation of prophylactic anticoagulation with Lovenox 40mg SQ daily until delivery   We reviewed the need for a 12 hour wait to receive regional anesthesia following administration of Lovenox  Third trimester prophylactic dosing of heparin requires the same waiting period and is dosed twice daily (instead of once daily for heparin), so I see no need to transition to heparin, which I discussed with her  4  Based on her age, BMI, and antiphospholipid antibody syndrome I recommend low dose aspirin to reduce her risk of preeclampsia, which I prescribed at 162mg/day  I recommend she stop this one week prior to delivery to reduce bleeding risk  Postpartum anticoagulation should be resumed when clinically approved by her obstetrician, and continued for 6 weeks postpartum  Further duration of treatment should be at the discretion of a hematologist    5  Lore's early glucola was elevated at 170  I recommend a fasting 3 hour glucose tolerance test, which I ordered for her  6  We inadvertently did not discuss her family cancer history  If further information is desired regarding her familial cancer risks, I recommend that she be referred to the Family Cancer Risk Evaluation Program for genetic counseling  She can schedule an appointment either at the [facility] ([phone]), or [facility] ([phone])      Please don't hesitate to contact our office with any concerns or questions     -Supriya Hutchinson MD

## 2021-03-25 NOTE — PATIENT INSTRUCTIONS
The use of low dose aspirin in pregnancy (81-162mg) is recommended in women with a high risk, or multiple moderate risk factors for preeclampsia  Aspirin therapy should be initiated between 12-28 weeks gestation, and is most effective if started prior to 16 weeks gestation, and continued until 37 weeks gestation  Low dose aspirin in pregnancy has been shown to reduce the incidence of preeclampsia in women with risk factors, and has been shown to be safe and without significant maternal or fetal risk  In light of your risk factors for preeclampsia, including: Body Mass Index 30 or greater I recommend initiating aspirin therapy, which was prescribed today

## 2021-03-25 NOTE — TELEPHONE ENCOUNTER
Called Lore, no answer  Left message regarding newly ordered 3 hour glucose tolerance test, confirmed should have done - apologized for missing previously elevated  Invited to call back with any additional questions or issues

## 2021-03-25 NOTE — PROGRESS NOTES
Routine OB 13w6d  Overall feels well  Denies any loss of fluid, bleeding or  regular contractions  She confirms fetal movement alraedy  Urine Dip NEG for glucose and NEG for protein  Saw MFM today confirms BOY and wants a tubal  Gave lab slips to repeat her CMP and get AFP drawn  Educated that ideally drawn between 16-18wks

## 2021-03-25 NOTE — PROGRESS NOTES
Antiphospholipid syndrome during pregnancy ([facility] [address] )  Lovenox daily to continue along with  mg  Uterine rupture during labor  Will plan repeat at 36 wks  Would likely be - not requested yet- will wait until early third trimester  Discussed likely AM or CS based on Friday and patient would like to know and meet her surgeon prior to scheduled delivery  Request for sterilization  Plans for salpingectomy at time of scheduled repeat at 36 wks  Pregnancy, obstetrical care, second trimester  38 yo  at 13+6 here for routine ob visit  Came from [facility] where she had US- too large for NT but images reassuring  No contractions, no leaking, no bleeding  Feeling some movement already! Reviewed new ob labs  ALT mildly elevated and repeat CMP ordered with 3 hour as well as AFP  Return in 4 wks  Obesity (BMI 30-39  9)  Discussed goal wt gain 11-20# in pregnancy  Glucose intolerance  Early glucola 170  3 hour ordered

## 2021-03-25 NOTE — LETTER
2021     [address]  [address] [address]    Patient: Kashif Birmingham   YOB: 1985   Date of Visit: 3/25/2021       Dear Dr Jostin Beasley: Thank you for referring Kashif Birmingham to me for evaluation  Below are my notes for this consultation  If you have questions, please do not hesitate to call me  I look forward to following your patient along with you  Sincerely,        Adam Pedersen MD        CC: No Recipients  Adam Pedersen MD  3/25/2021  6:21 PM  Sign when Signing Visit  [address] [facility] Ms Theo Barroso was seen today at 13w6d for nuchal translucency ultrasound  See ultrasound report under "OB Procedures" tab  My recommendations are as follows:  1  We reviewed the availability of genetic screening, as well as diagnostic testing, which are available to all pregnant women  She already completed NIPT which resulted low risk, which we reviewed  She does not wish to pursue diagnostic testing at this time  A detailed anatomic survey as well as transvaginal cervical length screening are recommended between 18-22 weeks gestation  MSAFP screening is recommended at 16-18 weeks gestation, to be ordered and followed through your office  I recommend 150 minutes of exercise per week, and goal gestational weight gain of no more than 11-20 pounds  2  We discussed her history of uterine rupture and recommendations regarding management this pregnancy  I would recommend management as in her most recent pregnancy, with planned repeat  delivery at 36 weeks gestation, and a low threshold for delivery sooner if she has signs or symptoms of labor   corticosteroids should be considered since delivery is planned prior to 37 weeks gestation  Antepartum fetal surveillance will be initiated at 32 weeks gestation based on antiphospholipid syndrome  This pregnancy was a surprise and conceived while using contraception   She is planning bilateral salpingectomy at time of  delivery  3  Based on her history of antiphospholipid syndrome, following fetal anatomic survey, I recommend serial evaluation of fetal growth every 4-6 weeks  Twice weekly antepartum fetal surveillance is recommended at 32 weeks gestation  Delivery will be as indicated by prior uterine rupture (see #2)  I agree with continuation of prophylactic anticoagulation with Lovenox 40mg SQ daily until delivery  We reviewed the need for a 12 hour wait to receive regional anesthesia following administration of Lovenox  Third trimester prophylactic dosing of heparin requires the same waiting period and is dosed twice daily (instead of once daily for heparin), so I see no need to transition to heparin, which I discussed with her  4  Based on her age, BMI, and antiphospholipid antibody syndrome I recommend low dose aspirin to reduce her risk of preeclampsia, which I prescribed at 162mg/day  I recommend she stop this one week prior to delivery to reduce bleeding risk  Postpartum anticoagulation should be resumed when clinically approved by her obstetrician, and continued for 6 weeks postpartum  Further duration of treatment should be at the discretion of a hematologist    5  Lore's early glucola was elevated at 170  I recommend a fasting 3 hour glucose tolerance test, which I ordered for her  6  We inadvertently did not discuss her family cancer history  If further information is desired regarding her familial cancer risks, I recommend that she be referred to the Family Cancer Risk Evaluation Program for genetic counseling  She can schedule an appointment either at the [address] [place] [address] ([phone]), or [facility] ([phone])      Please don't hesitate to contact our office with any concerns or questions     -Angel Milligan MD

## 2021-04-15 ENCOUNTER — LAB (OUTPATIENT)
Dept: LAB | Facility: HOSPITAL | Age: 36
End: 2021-04-15
Payer: COMMERCIAL

## 2021-04-15 DIAGNOSIS — IMO0002: ICD-10-CM

## 2021-04-15 DIAGNOSIS — O99.810 ABNORMAL GLUCOSE AFFECTING PREGNANCY: ICD-10-CM

## 2021-04-15 DIAGNOSIS — Z3A.13 13 WEEKS GESTATION OF PREGNANCY: ICD-10-CM

## 2021-04-15 DIAGNOSIS — Z34.81 PRENATAL CARE, SUBSEQUENT PREGNANCY, FIRST TRIMESTER: ICD-10-CM

## 2021-04-15 DIAGNOSIS — Z36.9 ENCOUNTER FOR ANTENATAL SCREENING: ICD-10-CM

## 2021-04-15 LAB
ALBUMIN SERPL BCP-MCNC: 3 G/DL (ref 3.5–5)
ALP SERPL-CCNC: 52 U/L (ref 46–116)
ALT SERPL W P-5'-P-CCNC: 73 U/L (ref 12–78)
ANION GAP SERPL CALCULATED.3IONS-SCNC: 11 MMOL/L (ref 4–13)
AST SERPL W P-5'-P-CCNC: 23 U/L (ref 5–45)
BILIRUB SERPL-MCNC: 0.2 MG/DL (ref 0.2–1)
BUN SERPL-MCNC: 7 MG/DL (ref 5–25)
CALCIUM ALBUM COR SERPL-MCNC: 9.9 MG/DL (ref 8.3–10.1)
CALCIUM SERPL-MCNC: 9.1 MG/DL (ref 8.3–10.1)
CHLORIDE SERPL-SCNC: 103 MMOL/L (ref 100–108)
CO2 SERPL-SCNC: 25 MMOL/L (ref 21–32)
CREAT SERPL-MCNC: 0.58 MG/DL (ref 0.6–1.3)
GFR SERPL CREATININE-BSD FRML MDRD: 118 ML/MIN/1.73SQ M
GLUCOSE 1H P 100 G GLC PO SERPL-MCNC: 193 MG/DL (ref 65–179)
GLUCOSE 2H P 100 G GLC PO SERPL-MCNC: 147 MG/DL (ref 65–154)
GLUCOSE 3H P 100 G GLC PO SERPL-MCNC: 45 MG/DL (ref 65–139)
GLUCOSE P FAST SERPL-MCNC: 77 MG/DL (ref 65–99)
GLUCOSE P FAST SERPL-MCNC: 80 MG/DL (ref 65–99)
POTASSIUM SERPL-SCNC: 3.8 MMOL/L (ref 3.5–5.3)
PROT SERPL-MCNC: 7 G/DL (ref 6.4–8.2)
SODIUM SERPL-SCNC: 139 MMOL/L (ref 136–145)

## 2021-04-15 PROCEDURE — 82951 GLUCOSE TOLERANCE TEST (GTT): CPT

## 2021-04-15 PROCEDURE — 36415 COLL VENOUS BLD VENIPUNCTURE: CPT

## 2021-04-15 PROCEDURE — 80053 COMPREHEN METABOLIC PANEL: CPT

## 2021-04-15 PROCEDURE — 82105 ALPHA-FETOPROTEIN SERUM: CPT

## 2021-04-15 PROCEDURE — 82952 GTT-ADDED SAMPLES: CPT

## 2021-04-15 NOTE — RESULT ENCOUNTER NOTE
Patient passed 3h gtt (1/4 values elevated)  Advised via [employer]  Will need repeat 3hGTT at 24-28 weeks    Eloy Neville MD

## 2021-04-17 LAB
2ND TRIMESTER 4 SCREEN SERPL-IMP: NORMAL
AFP ADJ MOM SERPL: 1.43
AFP INTERP AMN-IMP: NORMAL
AFP INTERP SERPL-IMP: NORMAL
AFP INTERP SERPL-IMP: NORMAL
AFP SERPL-MCNC: 45.2 NG/ML
AGE AT DELIVERY: 37.5 YR
GA METHOD: NORMAL
GA: 16.9 WEEKS
IDDM PATIENT QL: NO
MULTIPLE PREGNANCY: NO
NEURAL TUBE DEFECT RISK FETUS: 3311 %

## 2021-04-21 ENCOUNTER — ROUTINE PRENATAL (OUTPATIENT)
Dept: OBGYN CLINIC | Facility: CLINIC | Age: 36
End: 2021-04-21

## 2021-04-21 VITALS — DIASTOLIC BLOOD PRESSURE: 78 MMHG | BODY MASS INDEX: 40.32 KG/M2 | SYSTOLIC BLOOD PRESSURE: 124 MMHG | WEIGHT: 213.4 LBS

## 2021-04-21 DIAGNOSIS — Z98.891 HISTORY OF CESAREAN SECTION: ICD-10-CM

## 2021-04-21 DIAGNOSIS — Z30.2 REQUEST FOR STERILIZATION: ICD-10-CM

## 2021-04-21 DIAGNOSIS — E74.39 GLUCOSE INTOLERANCE: ICD-10-CM

## 2021-04-21 DIAGNOSIS — O34.02 BICORNATE UTERUS COMPLICATING PREGNANCY, SECOND TRIMESTER: ICD-10-CM

## 2021-04-21 DIAGNOSIS — R11.2 NON-INTRACTABLE VOMITING WITH NAUSEA, UNSPECIFIED VOMITING TYPE: ICD-10-CM

## 2021-04-21 DIAGNOSIS — Z34.92 PREGNANCY, OBSTETRICAL CARE, SECOND TRIMESTER: Primary | ICD-10-CM

## 2021-04-21 DIAGNOSIS — O09.299 HISTORY OF INTRAUTERINE GROWTH RESTRICTION IN PRIOR PREGNANCY, CURRENTLY PREGNANT: ICD-10-CM

## 2021-04-21 DIAGNOSIS — O99.119 ANTIPHOSPHOLIPID SYNDROME DURING PREGNANCY (HCC): ICD-10-CM

## 2021-04-21 DIAGNOSIS — Q51.3 BICORNATE UTERUS COMPLICATING PREGNANCY, SECOND TRIMESTER: ICD-10-CM

## 2021-04-21 DIAGNOSIS — D68.61 ANTIPHOSPHOLIPID SYNDROME DURING PREGNANCY (HCC): ICD-10-CM

## 2021-04-21 LAB
SL AMB  POCT GLUCOSE, UA: NORMAL
SL AMB POCT URINE PROTEIN: NORMAL

## 2021-04-21 PROCEDURE — PNV: Performed by: STUDENT IN AN ORGANIZED HEALTH CARE EDUCATION/TRAINING PROGRAM

## 2021-04-21 RX ORDER — DOXYLAMINE SUCCINATE AND PYRIDOXINE HYDROCHLORIDE, DELAYED RELEASE TABLETS 10 MG/10 MG 10; 10 MG/1; MG/1
1 TABLET, DELAYED RELEASE ORAL 4 TIMES DAILY
COMMUNITY
Start: 2021-04-19 | End: 2021-08-29 | Stop reason: HOSPADM

## 2021-04-21 RX ORDER — METOCLOPRAMIDE 5 MG/1
5 TABLET ORAL 4 TIMES DAILY
Qty: 30 TABLET | Refills: 2 | Status: SHIPPED | OUTPATIENT
Start: 2021-04-21 | End: 2021-08-29 | Stop reason: HOSPADM

## 2021-04-21 NOTE — ASSESSMENT & PLAN NOTE
40 yo [id number] at 17 5/7 weeks, routine PNV  Denies leakage of fluid, vaginal bleeding, contractions    Feeling some fetal movement   -precautions reviewed  -Level 2 scheduled  -Routine 4 week follow up

## 2021-04-21 NOTE — PROGRESS NOTES
Problem List Items Addressed This Visit        Hematopoietic and Hemostatic    Antiphospholipid syndrome during pregnancy ([facility] [address] )     Continue lovenox, baby ASA            Genitourinary    Bicornate uterus complicating pregnancy, second trimester    Uterine rupture during labor     Plan for delivery at 39 weeks  Will plan for scheduling with me as Lore comfortable with plan, planned Friday date  Will be for repeat  section and tubal sterilization            Other    History of  section     x3 with history of rupture         Request for sterilization     Plan for salpingectomy         Pregnancy, obstetrical care, second trimester - Primary     40 yo [id number] at 16 5/7 weeks, routine PNV  Denies leakage of fluid, vaginal bleeding, contractions    Feeling some fetal movement   -precautions reviewed  -Level 2 scheduled  -Routine 4 week follow up         Relevant Orders    POCT urine dip (Completed)    Glucose intolerance     Passed early 3 hr GTT  Plan for repeat 3 hour GTT at 23-28 weeks         History of intrauterine growth restriction in prior pregnancy, currently pregnant      Other Visit Diagnoses     Non-intractable vomiting with nausea, unspecified vomiting type        Relevant Medications    metoclopramide (REGLAN) 5 mg tablet

## 2021-04-21 NOTE — PROGRESS NOTES
17w5d  O pos  Feels tired, no cramping or VB  Having a boy, Level II US is scheduled for 5/14  Urine neg for both glucose and protein  No concerns  Completed 3 hr GTT and AFP labs

## 2021-04-21 NOTE — ASSESSMENT & PLAN NOTE
Plan for delivery at 36 weeks  Will plan for scheduling with me as Lore comfortable with plan, planned Friday date  Will be for repeat  section and tubal sterilization

## 2021-05-14 ENCOUNTER — ROUTINE PRENATAL (OUTPATIENT)
Dept: PERINATAL CARE | Facility: OTHER | Age: 36
End: 2021-05-14
Payer: COMMERCIAL

## 2021-05-14 VITALS
WEIGHT: 216.27 LBS | BODY MASS INDEX: 40.83 KG/M2 | HEART RATE: 97 BPM | SYSTOLIC BLOOD PRESSURE: 116 MMHG | DIASTOLIC BLOOD PRESSURE: 75 MMHG | HEIGHT: 61 IN

## 2021-05-14 DIAGNOSIS — Z87.828 H/O RUPTURE OF UTERUS: ICD-10-CM

## 2021-05-14 DIAGNOSIS — O09.522 SUPERVISION OF ELDERLY MULTIGRAVIDA, SECOND TRIMESTER: ICD-10-CM

## 2021-05-14 DIAGNOSIS — O09.299 HISTORY OF INTRAUTERINE GROWTH RESTRICTION IN PRIOR PREGNANCY, CURRENTLY PREGNANT: Primary | ICD-10-CM

## 2021-05-14 DIAGNOSIS — Q51.3 BICORNATE UTERUS COMPLICATING PREGNANCY, SECOND TRIMESTER: ICD-10-CM

## 2021-05-14 DIAGNOSIS — Z36.86 ENCOUNTER FOR ANTENATAL SCREENING FOR CERVICAL LENGTH: ICD-10-CM

## 2021-05-14 DIAGNOSIS — Z3A.21 21 WEEKS GESTATION OF PREGNANCY: ICD-10-CM

## 2021-05-14 DIAGNOSIS — O34.02 BICORNATE UTERUS COMPLICATING PREGNANCY, SECOND TRIMESTER: ICD-10-CM

## 2021-05-14 DIAGNOSIS — O34.211 MATERNAL CARE DUE TO LOW TRANSVERSE UTERINE SCAR FROM PREVIOUS CESAREAN DELIVERY: ICD-10-CM

## 2021-05-14 DIAGNOSIS — O99.212 OBESITY COMPLICATING PREGNANCY IN SECOND TRIMESTER: ICD-10-CM

## 2021-05-14 PROBLEM — Z3A.13 13 WEEKS GESTATION OF PREGNANCY: Status: RESOLVED | Noted: 2021-03-25 | Resolved: 2021-05-14

## 2021-05-14 PROCEDURE — 76811 OB US DETAILED SNGL FETUS: CPT | Performed by: OBSTETRICS & GYNECOLOGY

## 2021-05-14 PROCEDURE — 76817 TRANSVAGINAL US OBSTETRIC: CPT | Performed by: OBSTETRICS & GYNECOLOGY

## 2021-05-14 PROCEDURE — 99213 OFFICE O/P EST LOW 20 MIN: CPT | Performed by: OBSTETRICS & GYNECOLOGY

## 2021-05-14 NOTE — LETTER
May 15, 2021     Jim[address] Orr [address]  [address] [address]    Patient: Ya West   YOB: 1985   Date of Visit: 2021       Dear Dr Cathy Kim: Thank you for referring Ya West to me for evaluation  Below are my notes for this consultation  If you have questions, please do not hesitate to call me  I look forward to following your patient along with you  Sincerely,        Aren Spencer MD        CC: No Recipients  Aren Spencer MD  5/15/2021 10:32 PM  Incomplete  Ya West  has no complaints today 21w0d  She reports fetal movements and does not report any vaginal bleeding or signs of labor  Her recently completed fetal testing revealed a normal NIPT and MSAFP  She declines the COVID vaccine  She reports that both she and her partner Gian Gaitan had COVID prior to pregnancy    Problem list:  1  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  2  History of first-trimester pregnancy loss  3  History of uterine rupture during labor 2013 at [facility] AT THE [facility]  4  History of prior pregnancy with growth restriction  5  History of a prior  section x3  and is planning on repeat  and tubal  6  Elevated BMI of 40  7  Her chart suggest she has an anti phospholipid syndrome and that she is treated with Lovenox 40 units daily and baby aspirin 162 milligrams daily  Labs reviewed though she only showed a in anticardiolipin IgM of 16  She states that another [facility] provider has also told her that this does not qualify as antiphospholipid syndrome but Dr Romana Grissom her [facility] provider did feel this qualified and she feels that she has had successful outcomes since this treatment and is unwilling to consider stopping it  Ultrasound findings: The ultrasound today shows normal interval fetal growth and fluid, normal cervical length, and no malformations were detected   The anatomical survey is limited by fetal position and her prior CS scar and maternal skin thickness  Pregnancy ultrasound has limitations and is unable to detect all forms of fetal congenital abnormalities  Follow up recommended:   1  Recommend a 28 week US for growth and missed anatomy  2  See her initial MFM consult for the rest of her plan for her pregnancy  Pre visit time reviewing her records   10 minutes  Face to face time 15 minutes  Post visit time on documentation of note, updating her problem list, adding orders and prescriptions 5 minutes  Procedures that were completed today were charged separately  The level of decision making was moderate complexity  MD Eduardo Sierra MD  2021  8:37 PM  Sign when Signing Visit  Kristin Cruz  has no complaints today 21w0d  She reports fetal movements and does not report any vaginal bleeding or signs of labor  Her recently completed fetal testing revealed a *** she has not completed the COVID vaccine  She reports that both she and her partner Rodney Johnson had COVID prior to pregnancy    Problem list:  8   uterine malformation possible bicornuate verses didelphys  9   history of first-trimester pregnancy loss  10   history of uterine rupture during labor 2013 at [facility] AT THE [facility]  11   history of prior pregnancy with growth restriction  12   history of prior  section x3 planning on repeat  and tubal  13   elevated BMI of 40  14  Her chart suggest she has an anti phospholipid syndrome and that she is treated with Lovenox 40 units daily and baby aspirin 162 milligrams daily  Labs reviewed though she only showed a in anticardiolipin IgM of 16  She states that another MFM provider has also told her that this does not qualify S antiphospholipid syndrome but Dr Leland Curling her [facility] provider did feel this qualified and she feels that she has had successful outcomes since this treatment and is unwilling to consider stopping it  Ultrasound findings:   The ultrasound today shows normal interval fetal growth and fluid, normal cervical length, and no malformations were detected  Pregnancy ultrasound has limitations and is unable to detect all forms of fetal congenital abnormalities  Specific counseling was provided on the following problems:  3  ***    Follow up recommended:   1  ***    Pre visit time reviewing her records   *** minutes  Face to face time *** minutes  Post visit time on documentation of note, updating her problem list, adding orders and prescriptions *** minutes  Procedures that were completed today were charged separately     The level of decision making was {decision level:42365}    Audra Sprague MD

## 2021-05-14 NOTE — LETTER
May 15, 2021     [address]  [address] [address]    Patient: Juma Isabel   YOB: 1985   Date of Visit: 2021       Dear Dr Arlyn Hathaway: Thank you for referring Juma Isabel to me for evaluation  Below are my notes for this consultation  If you have questions, please do not hesitate to call me  I look forward to following your patient along with you  Sincerely,        Rachel Nicole MD        CC: No Recipients  Rachel Nicole MD  5/15/2021 10:41 PM  Sign when Signing Visit  Juma Isabel  has no complaints today 21w0d  She reports fetal movements and does not report any vaginal bleeding or signs of labor  Her recently completed fetal testing revealed a normal NIPT and MSAFP  She declines the COVID vaccine  She reports that both she and her partner Jeramie Gave had COVID prior to pregnancy    Problem list:  1  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  2  History of first-trimester pregnancy loss  3  History of uterine rupture during labor 2013 at [address]  4  History of prior pregnancy with growth restriction  5  History of a prior  section x3  and is planning on repeat  and tubal  6  Elevated BMI of 40  7  Her chart suggest she has an anti phospholipid syndrome and that she is treated with Lovenox 40 units daily and baby aspirin 162 milligrams daily  Labs reviewed though she only showed a in anticardiolipin IgM of 16  She states that another [facility] provider has also told her that this does not qualify as antiphospholipid syndrome but Dr Zack Figueredo her [facility] provider did feel this qualified and she feels that she has had successful outcomes since this treatment and is unwilling to consider stopping it  Ultrasound findings: The ultrasound today shows normal interval fetal growth and fluid, normal cervical length, and no malformations were detected   The anatomical survey is limited by fetal position and her prior CS scar and maternal skin thickness  Pregnancy ultrasound has limitations and is unable to detect all forms of fetal congenital abnormalities  Follow up recommended:   1  Recommend a 28 week US for growth and missed anatomy  2  See her initial MFM consult for the rest of her plan for her pregnancy  Pre visit time reviewing her records   10 minutes  Face to face time 15 minutes  Post visit time on documentation of note, updating her problem list, adding orders and prescriptions 5 minutes  Procedures that were completed today were charged separately  The level of decision making was moderate complexity      Olvin Mojica MD

## 2021-05-14 NOTE — PROGRESS NOTES
Ultrasound Probe Disinfection    A transvaginal ultrasound was performed  Prior to use, disinfection was performed with High Level Disinfection Process (Trophon)  Probe serial number [id number]: [id number] was used        Emmett Gill  05/14/21  2:13 PM

## 2021-05-15 PROBLEM — Z87.828: Status: ACTIVE | Noted: 2021-02-11

## 2021-05-15 PROBLEM — O09.522 SUPERVISION OF ELDERLY MULTIGRAVIDA, SECOND TRIMESTER: Status: ACTIVE | Noted: 2021-05-15

## 2021-05-15 PROBLEM — O99.212 OBESITY COMPLICATING PREGNANCY IN SECOND TRIMESTER: Status: ACTIVE | Noted: 2021-05-15

## 2021-05-15 RX ORDER — CALCIUM GLUCONATE 45(500) MG
1000 TABLET ORAL DAILY
COMMUNITY
End: 2021-08-29 | Stop reason: HOSPADM

## 2021-05-15 NOTE — PROGRESS NOTES
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  2  History of first-trimester pregnancy loss  3  History of uterine rupture during labor 2013 at [address]  4  History of prior pregnancy with growth restriction  5  History of a prior  section x3  and is planning on repeat  and tubal  6  Elevated BMI of 40  7  Her chart suggest she has an anti phospholipid syndrome and that she is treated with Lovenox 40 units daily and baby aspirin 162 milligrams daily  Labs reviewed though she only showed a in anticardiolipin IgM of 16  She states that another MFM provider has also told her that this does not qualify as antiphospholipid syndrome but Dr Sana Dunham her [facility] provider did feel this qualified and she feels that she has had successful outcomes since this treatment and is unwilling to consider stopping it  Ultrasound findings: The ultrasound today shows normal interval fetal growth and fluid, normal cervical length, and no malformations were detected  The anatomical survey is limited by fetal position and her prior CS scar and maternal skin thickness  Pregnancy ultrasound has limitations and is unable to detect all forms of fetal congenital abnormalities  Follow up recommended:   1  Recommend a 28 week US for growth and missed anatomy  2  See her initial MFM consult for the rest of her plan for her pregnancy  3  She should be on calcium supplements in pregnancy since she is on Lovenox which can cause osteoporosis if used longterm  Recommend a total of 1500 mgs daily       Pre visit time reviewing her records   10 minutes  Face to face time 15 minutes  Post visit time on documentation of note, updating her problem list, adding orders and prescriptions 5 minutes  Procedures that were completed today were charged separately  The level of decision making was moderate complexity      Eun Chanel MD

## 2021-05-17 ENCOUNTER — TELEPHONE (OUTPATIENT)
Dept: OBGYN CLINIC | Facility: CLINIC | Age: 36
End: 2021-05-17

## 2021-05-17 ENCOUNTER — ROUTINE PRENATAL (OUTPATIENT)
Dept: OBGYN CLINIC | Facility: CLINIC | Age: 36
End: 2021-05-17

## 2021-05-17 VITALS — SYSTOLIC BLOOD PRESSURE: 118 MMHG | BODY MASS INDEX: 41.15 KG/M2 | DIASTOLIC BLOOD PRESSURE: 76 MMHG | WEIGHT: 217.8 LBS

## 2021-05-17 DIAGNOSIS — O34.02 BICORNATE UTERUS COMPLICATING PREGNANCY, SECOND TRIMESTER: ICD-10-CM

## 2021-05-17 DIAGNOSIS — Q51.3 BICORNATE UTERUS COMPLICATING PREGNANCY, SECOND TRIMESTER: ICD-10-CM

## 2021-05-17 DIAGNOSIS — Z87.828 H/O RUPTURE OF UTERUS: ICD-10-CM

## 2021-05-17 DIAGNOSIS — O09.522 SUPERVISION OF ELDERLY MULTIGRAVIDA, SECOND TRIMESTER: Primary | ICD-10-CM

## 2021-05-17 LAB
SL AMB  POCT GLUCOSE, UA: NEGATIVE
SL AMB POCT URINE PROTEIN: NEGATIVE

## 2021-05-17 PROCEDURE — PNV: Performed by: STUDENT IN AN ORGANIZED HEALTH CARE EDUCATION/TRAINING PROGRAM

## 2021-05-17 NOTE — ASSESSMENT & PLAN NOTE
For delivery at 39 weeks with AM  -plan for rLTCS and bilateral salpingectomy  -will need to sign consents with AM prior to procedure

## 2021-05-17 NOTE — PROGRESS NOTES
39 y o  [id number] at 21w3d, here for return OB visit  Feeling well overall and without concerns  Good FM - does have pain when baby in left uterine horn  Denies LOF, VB, contractions  No questions/concerns       Problem List Items Addressed This Visit        Genitourinary    Bicornate uterus complicating pregnancy, second trimester    H/O rupture of uterus     For delivery at 39 weeks with AM  -plan for rLTCS and bilateral salpingectomy  -will need to sign consents with AM prior to procedure            Other    Supervision of elderly multigravida, second trimester - Primary     -precautions reviewed  -genetic screening wnl

## 2021-05-17 NOTE — PROGRESS NOTES
Pt is here for routine ob visit  No concerns at this time  Urine neg/neg   No LOF,Vb,Contractions  +FM   RH +

## 2021-05-17 NOTE — TELEPHONE ENCOUNTER
----- Message from Angella Ray MD sent at 5/17/2021 12:04 PM EDT -----  Regarding: rLTCS scheduling  Per [facility], seems as if Usha Begin to do surgery on this Friday  Maybe we just ensure she is the [facility] doc on call that day  Procedure to be scheduled: rLTCS  EDUARD: Estimated Date of Delivery: 9/24/21  Indication for delivery: history uterine rupture  Requested date (s) of delivery: 8/27/21         If requested date is unavailable, is there a date by which the pt must be delivered? On this date      Physician preference: Usha Begin      If CS, with or without tubal: with bilateral salpingectomy

## 2021-05-19 ENCOUNTER — TELEPHONE (OUTPATIENT)
Dept: PERINATAL CARE | Facility: OTHER | Age: 36
End: 2021-05-19

## 2021-05-19 NOTE — TELEPHONE ENCOUNTER
Called patient to schedule follow up appointment: Patient should return in about 7 weeks (around 7/2/21) for level one ultrasound growth / and or missed anatomy    Left voicemail request patient to call back to schedule at [phone]

## 2021-06-02 ENCOUNTER — TELEPHONE (OUTPATIENT)
Dept: OBGYN CLINIC | Facility: CLINIC | Age: 36
End: 2021-06-02

## 2021-06-02 DIAGNOSIS — Z34.90 PREGNANCY AT EARLY STAGE: ICD-10-CM

## 2021-06-11 ENCOUNTER — TELEPHONE (OUTPATIENT)
Dept: OBGYN CLINIC | Facility: CLINIC | Age: 36
End: 2021-06-11

## 2021-06-11 DIAGNOSIS — Z34.90 PREGNANCY AT EARLY STAGE: ICD-10-CM

## 2021-06-14 ENCOUNTER — TELEPHONE (OUTPATIENT)
Dept: OBGYN CLINIC | Facility: CLINIC | Age: 36
End: 2021-06-14

## 2021-06-14 NOTE — TELEPHONE ENCOUNTER
[facility] from [facility] DR IZA ROSADO called saying they never received the rx for Lovenox  Dr Greg Montiel had put this on on 6/11  Wants to know if a verbal order would be ok  Please call him back       [phone]

## 2021-06-18 ENCOUNTER — ROUTINE PRENATAL (OUTPATIENT)
Dept: OBGYN CLINIC | Facility: CLINIC | Age: 36
End: 2021-06-18

## 2021-06-18 VITALS — DIASTOLIC BLOOD PRESSURE: 64 MMHG | BODY MASS INDEX: 42.17 KG/M2 | SYSTOLIC BLOOD PRESSURE: 122 MMHG | WEIGHT: 223.2 LBS

## 2021-06-18 DIAGNOSIS — O09.299 HISTORY OF INTRAUTERINE GROWTH RESTRICTION IN PRIOR PREGNANCY, CURRENTLY PREGNANT: ICD-10-CM

## 2021-06-18 DIAGNOSIS — O09.522 SUPERVISION OF ELDERLY MULTIGRAVIDA, SECOND TRIMESTER: Primary | ICD-10-CM

## 2021-06-18 DIAGNOSIS — Z30.2 REQUEST FOR STERILIZATION: ICD-10-CM

## 2021-06-18 DIAGNOSIS — D68.61 ANTIPHOSPHOLIPID SYNDROME DURING PREGNANCY (HCC): ICD-10-CM

## 2021-06-18 DIAGNOSIS — O34.02 BICORNATE UTERUS COMPLICATING PREGNANCY, SECOND TRIMESTER: ICD-10-CM

## 2021-06-18 DIAGNOSIS — O99.119 ANTIPHOSPHOLIPID SYNDROME DURING PREGNANCY (HCC): ICD-10-CM

## 2021-06-18 DIAGNOSIS — Z98.891 HISTORY OF CESAREAN SECTION: ICD-10-CM

## 2021-06-18 DIAGNOSIS — Q51.3 BICORNATE UTERUS COMPLICATING PREGNANCY, SECOND TRIMESTER: ICD-10-CM

## 2021-06-18 DIAGNOSIS — E74.39 GLUCOSE INTOLERANCE: ICD-10-CM

## 2021-06-18 DIAGNOSIS — Z87.828 H/O RUPTURE OF UTERUS: ICD-10-CM

## 2021-06-18 LAB
SL AMB  POCT GLUCOSE, UA: NORMAL
SL AMB POCT URINE PROTEIN: NORMAL

## 2021-06-18 PROCEDURE — PNV: Performed by: STUDENT IN AN ORGANIZED HEALTH CARE EDUCATION/TRAINING PROGRAM

## 2021-06-18 NOTE — ASSESSMENT & PLAN NOTE
Plan for delivery at 36 weeks via rLTCS and bilateral salpingectomy  Message sent for scheduling 8/27/2021 with me

## 2021-06-18 NOTE — ASSESSMENT & PLAN NOTE
Discussed with MFM, anti IgM 16 not qualify as APLS  However her [facility] Dr Trinidad Mantilla felt qualified, history of successful outcomes on treatment, would like to continue  Lovenox 40u daily, baby  mg daily

## 2021-06-18 NOTE — PROGRESS NOTES
Problem List Items Addressed This Visit        Hematopoietic and Hemostatic    Antiphospholipid syndrome during pregnancy [facility])     Discussed with MFM, anti IgM 16 not qualify as APLS  However her [facility] Dr Thu Duenas felt qualified, history of successful outcomes on treatment, would like to continue  Lovenox 40u daily, baby  mg daily            Genitourinary    Bicornate uterus complicating pregnancy, second trimester     Continue to monitor growth         H/O rupture of uterus     Plan for delivery at 36 weeks via rLTCS and bilateral salpingectomy  Message sent for scheduling 2021 with me            Other    History of  section    Request for sterilization     Plan for salpingectomy at time of  section         Glucose intolerance     Repeat 3hr GTT ordered today in lieu of 1hr screening         Relevant Orders    Glucose JOSELUIS 3HR 100GM PREG PTS    History of intrauterine growth restriction in prior pregnancy, currently pregnant     Continue to monitor growth         Supervision of elderly multigravida, second trimester - Primary    Relevant Orders    POCT urine dip (Completed)    CBC and differential    RPR    Glucose JOSELUIS 3HR 100GM PREG PTS

## 2021-06-21 ENCOUNTER — TELEPHONE (OUTPATIENT)
Dept: OBGYN CLINIC | Facility: CLINIC | Age: 36
End: 2021-06-21

## 2021-06-22 ENCOUNTER — TELEPHONE (OUTPATIENT)
Dept: OBGYN CLINIC | Facility: CLINIC | Age: 36
End: 2021-06-22

## 2021-07-01 PROBLEM — D68.61 ANTIPHOSPHOLIPID ANTIBODY SYNDROME COMPLICATING PREGNANCY (HCC): Status: ACTIVE | Noted: 2021-07-01

## 2021-07-01 PROBLEM — O44.42 LOW-LYING PLACENTA WITHOUT HEMORRHAGE, SECOND TRIMESTER: Status: ACTIVE | Noted: 2021-07-01

## 2021-07-01 PROBLEM — O09.523 ELDERLY MULTIGRAVIDA, THIRD TRIMESTER: Status: ACTIVE | Noted: 2021-07-01

## 2021-07-01 PROBLEM — O99.119 ANTIPHOSPHOLIPID ANTIBODY SYNDROME COMPLICATING PREGNANCY (HCC): Status: ACTIVE | Noted: 2021-07-01

## 2021-07-01 PROBLEM — E66.01 MATERNAL MORBID OBESITY IN THIRD TRIMESTER, ANTEPARTUM (HCC): Status: ACTIVE | Noted: 2021-07-01

## 2021-07-01 PROBLEM — O99.213 MATERNAL MORBID OBESITY IN THIRD TRIMESTER, ANTEPARTUM (HCC): Status: ACTIVE | Noted: 2021-07-01

## 2021-07-01 PROBLEM — O09.293 HX OF INTRAUTERINE GROWTH RESTRICTION IN PRIOR PREGNANCY, CURRENTLY PREGNANT, THIRD TRIMESTER: Status: ACTIVE | Noted: 2021-07-01

## 2021-07-02 ENCOUNTER — ULTRASOUND (OUTPATIENT)
Dept: PERINATAL CARE | Facility: OTHER | Age: 36
End: 2021-07-02
Payer: COMMERCIAL

## 2021-07-02 VITALS
SYSTOLIC BLOOD PRESSURE: 122 MMHG | DIASTOLIC BLOOD PRESSURE: 77 MMHG | HEART RATE: 103 BPM | HEIGHT: 61 IN | BODY MASS INDEX: 42.17 KG/M2

## 2021-07-02 DIAGNOSIS — O09.523 ELDERLY MULTIGRAVIDA, THIRD TRIMESTER: ICD-10-CM

## 2021-07-02 DIAGNOSIS — E66.01 MATERNAL MORBID OBESITY IN THIRD TRIMESTER, ANTEPARTUM (HCC): ICD-10-CM

## 2021-07-02 DIAGNOSIS — Z3A.28 28 WEEKS GESTATION OF PREGNANCY: ICD-10-CM

## 2021-07-02 DIAGNOSIS — O09.293 HX OF INTRAUTERINE GROWTH RESTRICTION IN PRIOR PREGNANCY, CURRENTLY PREGNANT, THIRD TRIMESTER: ICD-10-CM

## 2021-07-02 DIAGNOSIS — O99.213 MATERNAL MORBID OBESITY IN THIRD TRIMESTER, ANTEPARTUM (HCC): ICD-10-CM

## 2021-07-02 DIAGNOSIS — O44.42 LOW-LYING PLACENTA WITHOUT HEMORRHAGE, SECOND TRIMESTER: ICD-10-CM

## 2021-07-02 DIAGNOSIS — D68.61 ANTIPHOSPHOLIPID ANTIBODY SYNDROME COMPLICATING PREGNANCY (HCC): Primary | ICD-10-CM

## 2021-07-02 DIAGNOSIS — O99.119 ANTIPHOSPHOLIPID ANTIBODY SYNDROME COMPLICATING PREGNANCY (HCC): Primary | ICD-10-CM

## 2021-07-02 PROCEDURE — 99214 OFFICE O/P EST MOD 30 MIN: CPT | Performed by: OBSTETRICS & GYNECOLOGY

## 2021-07-02 PROCEDURE — 76817 TRANSVAGINAL US OBSTETRIC: CPT | Performed by: OBSTETRICS & GYNECOLOGY

## 2021-07-02 PROCEDURE — 76816 OB US FOLLOW-UP PER FETUS: CPT | Performed by: OBSTETRICS & GYNECOLOGY

## 2021-07-02 NOTE — LETTER
July 2, 2021     Jimena Elder [address]  [address] [address]    Patient: April Kendrick   YOB: 1985   Date of Visit: 7/2/2021       Dear Dr Simón Pérez: Thank you for referring April Kendrick to me for evaluation  Below are my notes for this consultation  If you have questions, please do not hesitate to call me  I look forward to following your patient along with you  Sincerely,        Cecy Perez MD        CC: No Recipients  Cecy Perez MD  7/1/2021  9:16 AM  Sign when Signing Visit   Please refer to the [facility] ultrasound report in Ob Procedures for additional information regarding today's visit

## 2021-07-08 ENCOUNTER — TELEPHONE (OUTPATIENT)
Dept: OBGYN CLINIC | Facility: CLINIC | Age: 36
End: 2021-07-08

## 2021-07-08 NOTE — TELEPHONE ENCOUNTER
----- Message from Dion Weinberg MD sent at 7/8/2021  3:36 PM EDT -----  Regarding: follow up  Please reach out to Lore to schedule her next prenatal appointment  She should ideally also get her lab work done (including 3 hour glucose) soon   -AMM

## 2021-07-09 ENCOUNTER — ROUTINE PRENATAL (OUTPATIENT)
Dept: OBGYN CLINIC | Facility: CLINIC | Age: 36
End: 2021-07-09

## 2021-07-09 VITALS — BODY MASS INDEX: 43.27 KG/M2 | DIASTOLIC BLOOD PRESSURE: 80 MMHG | SYSTOLIC BLOOD PRESSURE: 124 MMHG | WEIGHT: 229 LBS

## 2021-07-09 DIAGNOSIS — Z98.891 HISTORY OF CESAREAN SECTION: ICD-10-CM

## 2021-07-09 DIAGNOSIS — E66.01 MATERNAL MORBID OBESITY IN THIRD TRIMESTER, ANTEPARTUM (HCC): ICD-10-CM

## 2021-07-09 DIAGNOSIS — O09.523 ELDERLY MULTIGRAVIDA, THIRD TRIMESTER: ICD-10-CM

## 2021-07-09 DIAGNOSIS — D68.61 ANTIPHOSPHOLIPID SYNDROME DURING PREGNANCY (HCC): ICD-10-CM

## 2021-07-09 DIAGNOSIS — O99.119 ANTIPHOSPHOLIPID SYNDROME DURING PREGNANCY (HCC): ICD-10-CM

## 2021-07-09 DIAGNOSIS — Z87.828 H/O RUPTURE OF UTERUS: ICD-10-CM

## 2021-07-09 DIAGNOSIS — O99.213 MATERNAL MORBID OBESITY IN THIRD TRIMESTER, ANTEPARTUM (HCC): ICD-10-CM

## 2021-07-09 DIAGNOSIS — Z34.93 PREGNANCY, OBSTETRICAL CARE, THIRD TRIMESTER: Primary | ICD-10-CM

## 2021-07-09 PROBLEM — O99.212 OBESITY COMPLICATING PREGNANCY IN SECOND TRIMESTER: Status: RESOLVED | Noted: 2021-05-15 | Resolved: 2021-07-09

## 2021-07-09 PROBLEM — Z3A.21 21 WEEKS GESTATION OF PREGNANCY: Status: RESOLVED | Noted: 2021-05-14 | Resolved: 2021-07-09

## 2021-07-09 PROBLEM — O09.91 HIGH-RISK PREGNANCY IN FIRST TRIMESTER: Status: RESOLVED | Noted: 2021-02-11 | Resolved: 2021-07-09

## 2021-07-09 PROBLEM — O09.522 SUPERVISION OF ELDERLY MULTIGRAVIDA, SECOND TRIMESTER: Status: RESOLVED | Noted: 2021-05-15 | Resolved: 2021-07-09

## 2021-07-09 PROBLEM — E66.9 OBESITY (BMI 30-39.9): Status: RESOLVED | Noted: 2021-03-25 | Resolved: 2021-07-09

## 2021-07-09 LAB
SL AMB  POCT GLUCOSE, UA: NORMAL
SL AMB POCT URINE PROTEIN: NORMAL

## 2021-07-09 PROCEDURE — PNV: Performed by: OBSTETRICS & GYNECOLOGY

## 2021-07-09 NOTE — PROGRESS NOTES
Patient presents for a routine prenatal visit    29W 0D  Good Fetal Movement  No LOF,bleeding, cramping or discharge  No current complaints at this time  Urine-Neg      VIS for Tdap given to patient and will consider for next visit  28 Week labs given at today's visit

## 2021-07-09 NOTE — ASSESSMENT & PLAN NOTE
Repeat csection and bilateral salpingectomy scheduled 2021  -36 weeks due to h/o rupture of uterus    Consent signed today     She was counseled on the risks of   She was counseled that there are medical and surgical methods to manage excessive postpartum bleeding  She was counseled that in the event of excessive blood loss, she may require blood transfusion which includes a small risk of blood borne diseases such as hepatitis and HIV  The patient is OK with receiving a blood transfusion if necessary  Also discussed c-hysterectomy if bleeding can not be controlled by other means  We reviewed the risks of the surgery including but not limited to infection, bleeding, injury to nearby organs (bowel  bladder, ureter, blood vessel, nerve) and possible long term consequences of such injury, as well as possibility of oopherectomy  The patient had an opportunity to ask questions and signed consent

## 2021-07-09 NOTE — ASSESSMENT & PLAN NOTE
29 weeks  28 week labs with 3hr GTT given    PNC follow up scheduled  Signs of labor and fetal kick counts reviewed

## 2021-07-09 NOTE — PROGRESS NOTES
Problem List Items Addressed This Visit        Hematopoietic and Hemostatic    Antiphospholipid syndrome during pregnancy ([facility] [address] )     Continues with lovenox and ldASA            Genitourinary    H/O rupture of uterus       Other    History of  section     Repeat csection and bilateral salpingectomy scheduled 2021  -36 weeks due to h/o rupture of uterus    Consent signed today     She was counseled on the risks of   She was counseled that there are medical and surgical methods to manage excessive postpartum bleeding  She was counseled that in the event of excessive blood loss, she may require blood transfusion which includes a small risk of blood borne diseases such as hepatitis and HIV  The patient is OK with receiving a blood transfusion if necessary  Also discussed c-hysterectomy if bleeding can not be controlled by other means  We reviewed the risks of the surgery including but not limited to infection, bleeding, injury to nearby organs (bowel  bladder, ureter, blood vessel, nerve) and possible long term consequences of such injury, as well as possibility of oopherectomy  The patient had an opportunity to ask questions and signed consent  Pregnancy, obstetrical care, third trimester - Primary     29 weeks  28 week labs with 3hr GTT given    PNC follow up scheduled  Signs of labor and fetal kick counts reviewed              Relevant Orders    POCT urine dip (Completed)    CBC and differential    Glucose JOSELUIS 3HR 100GM PREG PTS    RPR    Elderly multigravida, third trimester    Maternal morbid obesity in third trimester, antepartum ([facility] [address] )

## 2021-07-13 LAB — MISCELLANEOUS LAB TEST RESULT: NORMAL

## 2021-07-14 ENCOUNTER — TELEPHONE (OUTPATIENT)
Dept: OBGYN CLINIC | Facility: CLINIC | Age: 36
End: 2021-07-14

## 2021-07-19 ENCOUNTER — APPOINTMENT (OUTPATIENT)
Dept: LAB | Facility: HOSPITAL | Age: 36
End: 2021-07-19
Attending: OBSTETRICS & GYNECOLOGY
Payer: COMMERCIAL

## 2021-07-19 DIAGNOSIS — Z34.93 PREGNANCY, OBSTETRICAL CARE, THIRD TRIMESTER: ICD-10-CM

## 2021-07-19 LAB
BASOPHILS # BLD AUTO: 0.04 THOUSANDS/ΜL (ref 0–0.1)
BASOPHILS NFR BLD AUTO: 0 % (ref 0–1)
EOSINOPHIL # BLD AUTO: 0.33 THOUSAND/ΜL (ref 0–0.61)
EOSINOPHIL NFR BLD AUTO: 4 % (ref 0–6)
ERYTHROCYTE [DISTWIDTH] IN BLOOD BY AUTOMATED COUNT: 13.7 % (ref 11.6–15.1)
GLUCOSE 1H P 100 G GLC PO SERPL-MCNC: 208 MG/DL (ref 65–179)
GLUCOSE 2H P 100 G GLC PO SERPL-MCNC: 167 MG/DL (ref 65–154)
GLUCOSE 3H P 100 G GLC PO SERPL-MCNC: 59 MG/DL (ref 65–139)
GLUCOSE P FAST SERPL-MCNC: 82 MG/DL (ref 65–99)
HCT VFR BLD AUTO: 35.5 % (ref 34.8–46.1)
HGB BLD-MCNC: 11.6 G/DL (ref 11.5–15.4)
IMM GRANULOCYTES # BLD AUTO: 0.1 THOUSAND/UL (ref 0–0.2)
IMM GRANULOCYTES NFR BLD AUTO: 1 % (ref 0–2)
LYMPHOCYTES # BLD AUTO: 1.99 THOUSANDS/ΜL (ref 0.6–4.47)
LYMPHOCYTES NFR BLD AUTO: 21 % (ref 14–44)
MCH RBC QN AUTO: 30.2 PG (ref 26.8–34.3)
MCHC RBC AUTO-ENTMCNC: 32.7 G/DL (ref 31.4–37.4)
MCV RBC AUTO: 92 FL (ref 82–98)
MONOCYTES # BLD AUTO: 0.6 THOUSAND/ΜL (ref 0.17–1.22)
MONOCYTES NFR BLD AUTO: 6 % (ref 4–12)
NEUTROPHILS # BLD AUTO: 6.34 THOUSANDS/ΜL (ref 1.85–7.62)
NEUTS SEG NFR BLD AUTO: 68 % (ref 43–75)
NRBC BLD AUTO-RTO: 0 /100 WBCS
PLATELET # BLD AUTO: 205 THOUSANDS/UL (ref 149–390)
PMV BLD AUTO: 9.1 FL (ref 8.9–12.7)
RBC # BLD AUTO: 3.84 MILLION/UL (ref 3.81–5.12)
WBC # BLD AUTO: 9.4 THOUSAND/UL (ref 4.31–10.16)

## 2021-07-19 PROCEDURE — 85025 COMPLETE CBC W/AUTO DIFF WBC: CPT

## 2021-07-19 PROCEDURE — 86592 SYPHILIS TEST NON-TREP QUAL: CPT

## 2021-07-19 PROCEDURE — 82952 GTT-ADDED SAMPLES: CPT

## 2021-07-19 PROCEDURE — 82951 GLUCOSE TOLERANCE TEST (GTT): CPT

## 2021-07-19 PROCEDURE — 36415 COLL VENOUS BLD VENIPUNCTURE: CPT

## 2021-07-20 ENCOUNTER — TELEPHONE (OUTPATIENT)
Dept: OBGYN CLINIC | Facility: CLINIC | Age: 36
End: 2021-07-20

## 2021-07-20 DIAGNOSIS — O99.810 ABNORMAL GLUCOSE AFFECTING PREGNANCY: Primary | ICD-10-CM

## 2021-07-20 LAB — RPR SER QL: NORMAL

## 2021-07-20 NOTE — TELEPHONE ENCOUNTER
Per comm consent lm to pt I was placing referral to DP and why   Along with phone #    ----- Message from Frank Caldera MD sent at 7/19/2021  2:15 PM EDT -----  Please call Barbara Gil - need consultation for gestational diabetes

## 2021-07-27 ENCOUNTER — ROUTINE PRENATAL (OUTPATIENT)
Dept: OBGYN CLINIC | Facility: CLINIC | Age: 36
End: 2021-07-27
Payer: COMMERCIAL

## 2021-07-27 VITALS — DIASTOLIC BLOOD PRESSURE: 70 MMHG | SYSTOLIC BLOOD PRESSURE: 122 MMHG

## 2021-07-27 DIAGNOSIS — Z34.83 ENCOUNTER FOR SUPERVISION OF NORMAL PREGNANCY IN MULTIGRAVIDA IN THIRD TRIMESTER: Primary | ICD-10-CM

## 2021-07-27 LAB
SL AMB  POCT GLUCOSE, UA: NEGATIVE
SL AMB POCT URINE PROTEIN: NEGATIVE

## 2021-07-27 PROCEDURE — PNV: Performed by: OBSTETRICS & GYNECOLOGY

## 2021-07-27 PROCEDURE — 59025 FETAL NON-STRESS TEST: CPT | Performed by: STUDENT IN AN ORGANIZED HEALTH CARE EDUCATION/TRAINING PROGRAM

## 2021-07-27 NOTE — PATIENT INSTRUCTIONS
Valuable Online Resource:    [facility] pregnancy essential guide    [email or web address] [email or web address]      On the right side of the screen is a 50 page guide providing valuable information about your entire pregnancy  On the left hand side of the site you will see several other links to great information and resources that [facility] offers     If you click on the tab that says "Pregnancy and Birth Packet" this opens another 150 page guide to labor and delivery information as well as breast feeding information,  care, pediatricians, car seat safety and much more     The [facility] and [address] tab has a virtual tour of the new L&D unit, as well as valuable information about classes that are offered, breast feeding support, support groups and much more  Click around and enjoy all we have to offer! Please note that all information in regards to locations and visiting hours have not been updated due to COVID    We only deliver our babies at one location which is at [facility] [address] [address]                Diabetes and Pregnancy, Ambulatory Care   GENERAL INFORMATION:   What you need to know about diabetes and pregnancy:  Plan your pregnancy so healthcare providers can help you have a healthy pregnancy and baby  Decrease your risk of health problems by controlling your blood sugar levels before and during pregnancy  Seek immediate care for the following symptoms:   · Symptoms of hypoglycemia including being dizzy or shaking  You may sweat or have a headache  · Blood sugar level is over 200 mg/dL and you have stomach pain, nausea, vomiting, and confusion  Treatment for diabetes and pregnancy includes  getting prenatal care  Diabetes medicine or insulin may be needed to help control your blood sugar  Your healthcare provider may do a test called A1c at least every 3 months while you are pregnant   This blood test shows the average amount of sugar in your blood over the past 2 to 3 months  During labor and delivery, healthcare providers will check your blood sugar levels  They will give you insulin or glucose throughout your labor to keep your blood sugar at the right level  Manage diabetes and pregnancy:   · Eat a variety of healthy foods  The amount of calories you need depends on your weight before you got pregnant  Your healthcare provider or dietitian will tell you how many calories you need each day  You may need more calories while you are pregnant  You may need fewer calories if you are overweight  Your risk of problems such as high blood pressure and premature labor are higher if you are overweight  · Exercise  can help you keep your blood sugar level steady  Exercise can also help you lose weight if you are overweight  Ask your healthcare provider how much exercise you should get each day  Ask what types of physical activity you can do safely while you are pregnant  · Check your blood sugar level  Ask your healthcare providers when and how often you should check during the day  He will tell you what your blood sugar levels should be at different times throughout the day  He may want your blood sugar levels to be 60 mg/dL to 99 mg/dL before meals, at bedtime, and during the night  He may want them to be 100 mg/dL to 129 mg/dL after meals  Your healthcare provider can show you how to use a blood glucose meter to check your levels  · Take your diabetes medicine or insulin  as directed by your healthcare provider  If you have type 2 diabetes and you take diabetes pills, you may need to stop taking them and start using insulin  Insulin is safe to use during pregnancy  Certain medicines are not safe to use during pregnancy  Talk to your healthcare provider about all of the medicines you are currently taking  · Prevent hypoglycemia    Your risk of hypoglycemia (low blood sugar) is higher during pregnancy because you may not feel the symptoms  This risk is highest during the first trimester  Eat regular meals and snacks to avoid hypoglycemia  Always keep glucose tablets with you in case your blood sugar level gets low  If you do not have glucose tablets, drink milk, juice, or regular soda  Tell your family about the symptoms of hypoglycemia so they can help you if you cannot help yourself  Ask your healthcare provider how to manage hypoglycemia  · Prevent diabetic ketoacidosis (DKA)  DKA is a serious condition that can happen when your blood sugar level gets too high  Pregnancy increases your risk of DKA  The symptoms of DKA include stomach pain, nausea, vomiting, and confusion  Your healthcare provider may suggest that you test the levels of ketones in your urine when your blood sugar level is high  He may also ask you to check your ketones regularly if you are sick  · Do not drink alcohol  Alcohol is dangerous for your unborn baby  Alcohol can also increase your blood sugar levels and make your diabetes more difficult to manage  Ask your healthcare provider for information if you need help to quit drinking alcohol  · Do not smoke  If you smoke, it is never too late to quit  Smoking is harmful to your baby  Do not smoke around your baby, and do not let others smoke around him  Smoking can also worsen the problems that happen with diabetes  Ask your healthcare provider for information about quitting if you need help to stop smoking  Follow up with your healthcare provider as directed:  Write down your questions so you remember to ask them during your visits  and may not be sold, redistributed or otherwise used for commercial purposes  All illustrations and images included in CareNotes® are the copyrighted property of [employer]  or Taran Butcher  Diabetes and Nutrition   WHAT YOU NEED TO KNOW:   Why are nutrition plans important?   Nutrition plans help with healthy eating patterns that improve health  Nutrition plans and regular exercise help keep your blood sugar levels steady  They also help delay or prevent complications of diabetes, such as diabetic kidney disease  How do I create a nutrition plan? A dietitian will help you create a nutrition plan to meet your needs and your family's needs  The goal is for you to reach or maintain healthy weight, blood sugar, blood pressure, and lipid levels  You should meet with the dietitian at least 1 time each year  You will learn the following:  · How food affects your blood sugar levels    · How to create healthy eating habits    · How to make food choices based on your activity level, weight, and glucose levels    · How your favorite foods may fit into your plan    · Foods that contain carbohydrates (sugars and starches), including simple and complex carbohydrates    · How to keep track of all carbohydrates    · Correct portion sizes for each food    · Changes you can make to your plan if you get pregnant or are breastfeeding    What are some tips to do until I meet with the dietitian? · Do not skip meals  The goal is to keep your blood sugar level steady  Blood sugar levels may drop too low if you have received insulin and do not eat  · Eat more high-fiber foods, such as fresh or frozen fruits and vegetables, whole-grain breads, and beans  Fiber helps control or lower blood sugar and cholesterol levels  Choose whole fruits instead of fruit juice as much as possible  Sugar may be added to juice, and fiber may be removed  · Choose heart-healthy fats  Foods high in heart-healthy fats include olive oil, nuts, avocados, and fatty fish, such as salmon and tuna  Foods high in unhealthy fats include red meat, full-fat dairy products, and soft margarine  Unhealthy fats can increase your risk for heart disease, increase bad cholesterol, and lower good cholesterol  · Choose complex carbohydrates    Foods with complex carbohydrates include brown rice, whole-grain breads and cereals, and cooked beans  Foods with simple carbohydrates include white bread, white rice, most cold cereals, and snack foods  Your plan will include the amount of carbohydrate to have at one time or in a day  Your blood sugar level can get too high if you eat too much carbohydrate at one time  Blood sugar levels do not spike as high or drop as quickly with complex carbohydrates as with simple carbohydrates  Choose complex carbohydrates whenever possible  · Have less sodium (salt)  The risk for high blood pressure (BP) increases with high-sodium foods  Limit high-sodium foods, such as soy sauce, potato chips, and canned soup  Do not add salt to food you cook  Limit your use of table salt  Read labels to have no more than 2,300 milligrams of sodium in one day  · Limit artificial sweeteners  These may be found in food or drinks, such as diet soft drinks or other low-calorie beverages  Artificial sweeteners are low in calories  They may help you lower your overall calories and carbohydrates  It is important not to have more calories from other foods to make up for the calories saved  Artificial sweeteners do not have any nutrition  Eat whole foods and drink water as much as possible  Your plan may include beverages with artificial sweeteners for a short time  These can help you transition from high-sugar beverages to water  · Use the plate method for each meal   This method can help you eat the right amount of carbohydrates and keep your blood sugar levels under control  ? Draw an imaginary line down the middle of a 9-inch dinner plate  On one side, draw another line to divide that section in half  Your plate will have one large section and 2 small sections  ? Fill the largest section with non-starchy vegetables  These include broccoli, spinach, cucumbers, peppers, cauliflower, and tomatoes  ? Add a starch to one of the small sections   Starches include pasta, rice, whole-grain bread, tortillas, corn, potatoes, and beans  ? Add meat or another source of protein to the other small section  Examples include chicken or turkey without skin, fish, lean beef or pork, low-fat cheese, tofu, and eggs  ? Add dairy products or fruit next to your plate if your meal plan allows  Examples of dairy include skim or 1% milk and low-fat yogurt  If you do not drink milk or eat dairy products, you may be able to add another serving of starchy food instead  ? Have a low-calorie or calorie-free drink with your meal  Examples include water or unsweetened tea or coffee  What are the risks of alcohol? Alcohol can cause hypoglycemia (very low blood sugar level), especially if you use insulin  Alcohol can cause high blood sugar and BP levels, and weight gain if you drink too much  Women 21 years or older and men 72 years or older should limit alcohol to 1 drink a day  Men aged 24 to 59 years should limit alcohol to 2 drinks a day  A drink of alcohol is 12 ounces of beer, 5 ounces of wine, or 1½ ounces of liquor  Hypoglycemia can happen hours after you drink alcohol  Check your blood sugar level for several hours after you drink alcohol  Have a source of fast-acting carbohydrates with you in case your level goes too low  You need immediate care if you have signs or symptoms of hypoglycemia, such as sweating, confusion, or fainting  Why is it important to maintain a healthy weight? A healthy weight can help you control your diabetes  You can maintain a healthy weight with a nutrition plan and exercise  Ask your healthcare provider how much you should weigh  Ask him or her to help you create a weight loss plan if you are overweight  Together you can set weight loss and maintenance goals  Call your local emergency number [phone] in the [address]) if:   · You have any of the following signs of a heart attack:      ?  Squeezing, pressure, or pain in your chest    ? You may  also have any of the following:     § Discomfort or pain in your back, neck, jaw, stomach, or arm    § Shortness of breath    § Nausea or vomiting    § Lightheadedness or a sudden cold sweat      When should I seek immediate care? · You have a low blood sugar level and it does not improve with treatment  Symptoms are trouble thinking, a pounding heartbeat, and sweating  · Your blood sugar level is above 240 mg/dL and does not come down within 15 minutes of treatment  · You have ketones in your blood or urine  · You have nausea or are vomiting and cannot keep any food or liquid down  · You have blurred or double vision  · Your breath has a fruity, sweet smell, or your breathing is shallow  When should I call my doctor or diabetes care team?   · Your blood sugar levels are higher than your target goals  · You often have low blood sugar levels  · You have trouble coping with diabetes, or you feel anxious or depressed  · You have questions or concerns about your condition or care  CARE AGREEMENT:   You have the right to help plan your care  Learn about your health condition and how it may be treated  Discuss treatment options with your healthcare providers to decide what care you want to receive  You always have the right to refuse treatment  The above information is an  only  It is not intended as medical advice for individual conditions or treatments  Talk to your doctor, nurse or pharmacist before following any medical regimen to see if it is safe and effective for you  © Copyright [employer] 2021 Information is for End User's use only and may not be sold, redistributed or otherwise used for commercial purposes   All illustrations and images included in CareNotes® are the copyrighted property of [employer]  or [address] [place], [place] [email or web address]

## 2021-07-27 NOTE — ASSESSMENT & PLAN NOTE
Agnieszka Carr is a 39 y o  [id number]  31w4d who presents for routine PNV  Hx of bicornuate uterus   28 week labs reviewed: failed 3 hour glucose test  Will be following with MFM for diabetic management  Has not made appt yet  Reports felling less fetal movement than normal today  Reports 10 movements since arriving at the office today  NST completed, reactive per Dr Ethan Bradley     Denies contractions, cramping, leakage of fluid or vaginal bleeding  Reviewed  labor precautions and FKCs       Pregnancy Essential guide and Baby and Me web site recommended

## 2021-07-30 ENCOUNTER — TELEMEDICINE (OUTPATIENT)
Dept: PERINATAL CARE | Facility: CLINIC | Age: 36
End: 2021-07-30
Payer: COMMERCIAL

## 2021-07-30 ENCOUNTER — DOCUMENTATION (OUTPATIENT)
Dept: PERINATAL CARE | Facility: CLINIC | Age: 36
End: 2021-07-30

## 2021-07-30 DIAGNOSIS — Z3A.32 32 WEEKS GESTATION OF PREGNANCY: ICD-10-CM

## 2021-07-30 DIAGNOSIS — O24.419 GESTATIONAL DIABETES MELLITUS (GDM) IN THIRD TRIMESTER, GESTATIONAL DIABETES METHOD OF CONTROL UNSPECIFIED: Primary | ICD-10-CM

## 2021-07-30 PROCEDURE — G0108 DIAB MANAGE TRN  PER INDIV: HCPCS | Performed by: DIETITIAN, REGISTERED

## 2021-07-30 RX ORDER — BLOOD-GLUCOSE METER
EACH MISCELLANEOUS
Qty: 1 KIT | Refills: 0 | Status: SHIPPED | OUTPATIENT
Start: 2021-07-30 | End: 2022-07-18

## 2021-07-30 RX ORDER — LANCETS
EACH MISCELLANEOUS
Qty: 100 EACH | Refills: 3 | Status: SHIPPED | OUTPATIENT
Start: 2021-07-30 | End: 2022-07-18

## 2021-07-30 RX ORDER — BLOOD SUGAR DIAGNOSTIC
STRIP MISCELLANEOUS
Qty: 100 EACH | Refills: 3 | Status: SHIPPED | OUTPATIENT
Start: 2021-07-30 | End: 2021-08-29 | Stop reason: HOSPADM

## 2021-07-30 NOTE — PATIENT INSTRUCTIONS
1  Start 1800 calorie meal plan consisting of 3 meals and 3 snacks daily, including protein at each  2  Try to eat every 2-3 5 hours while awake  3  Do not exceed 8-10 hours fasting overnight  4  Continue self-monitoring blood glucose: 4 x per day (Fasting, 2 hour after start of each meal)  Goal: fasting glucose 90 mg/dL or less, and 2 hrs after the start of each meal 120 mg/dL or less (1 hr after the start of each meal 140 mg/dL or less)  5  Submit blood sugar values weekly via: Telephone  at [phone] or [employer] messaging or [employer] glucose flowsheet  6  If no restriction from physician, recommend up to 30 minutes walking daily  7  Report blood sugars on Thursday, 8/5/21  8  Complete 3 day food log and  self assessment form and send in via [employer] message as image attachment to M provider prior to class 2 appointment     9    F/u in 2 weeks

## 2021-07-30 NOTE — PROGRESS NOTES
Thank you for referring your patient to [facility] Maternal Fetal Medicine Diabetes in Pregnancy Program      Micki Dakins is a  39 y o  female who presents today for Class 1 and Class 2  Patient is at 32w0d gestation, Estimated Date of Delivery: 21  Reviewed and updated the following from patients medical record: PMH, Problem List, Allergies, and Current Medications  Visit Diagnosis:  GDM in pregnancy method of control unspecified    Discussed with patient what GDM is, pathophysiology of GDM, untreated GDM and maternal fetal complications including fetal macrosomia,  hypoglycemia, polyhydramnios, increased incidence of  section,  labor, and in severe cases fetal demise and still birth   Discussed importance of blood glucose monitoring, nutrition, and medication if necessary in achieving BG goals  Discussed resumption of non-diabetic state post delivery, but reviewed increased risk of Type 2 DM later in life and ways to reduce risk by maintaining a healthy weight and eating habits      Additional Pregnancy Complications:  Obesity and fibromyalgia, and history of PCOS, IUGR, and     Labs:  No components found for: HGA1C    Medications:  No diabetes related medications    Anthropometrics:  Ht Readings from Last 3 Encounters:   21 5' 1" (1 549 m)   21 5' 1" (1 549 m)   21 5' 1" (1 549 m)     Wt Readings from Last 3 Encounters:   21 104 kg (229 lb)   21 101 kg (223 lb 3 2 oz)   21 98 8 kg (217 lb 12 8 oz)     Pre-gravid weight: 99 8 kg (220 lb)  Pre-gravid BMI: 41 59  Weight Change: 4 082 kg (9 lb)  Weight gain recommendations: BMI (> 30) 11-20 lbs    Blood Glucose Monitoring:   Glucose Meter: Contour Next EZ  Instructed on testing blood sugars: 4 x per day (Fasting, 2 hour after start of each meal)    Gave instruction on site selection, skin preparation, loading strips and lancet device, meter activation, obtaining blood sample, test strip and lancet disposal and storage, and recording log book entries  Patient has good understanding of material covered and was able to test their own blood sugar in office today  Instruction for reporting blood sugar results weekly via:  Phone: [phone]   OR  My Chart (Message with image attachment, or Glucose Flowsheet)    Goal Blood Sugar Ranges:   Fastin-90 mg/dL  1 hour after the start of each meal: 140 mg/dL or <  2 hours after start of each meal: 120 mg/dL or <    Meal Plan (daily calorie and protein needs):  Calories: 1800 calorie (CHO: 40-67-53-43-51-38) (PRO:2-1-3-1-3-2)    Additional Nutrition Concerns: acid reflux and heart burn with all foods right now not just acidic and spicy foods     Meal Plan Tips:  1  Patient was provided with a meal plan including 3 meals and 3 snacks  2  Discussed appropriate amounts of CHO, PRO, and Fat at each meal and snack  3  Reviewed CHO exchange list, and portion sizes for both CHO and PRO via food models  4  Instruction on how to read a food label  5  Provided suggested meal/snack options to increase nutrition and maintain consistent meal and snack intakes  6  Instructed on how to keep a 3-day food diary to be brought to follow- up appointment  7  Encouraged  patient to eat every 2 0-3 5 hours while awake  8  Encouraged patient to go no longer than 8-10 hours fasting overnight until first meal of the day  Physical Activity:  Discussed benefits of physical activity to optimize blood glucose control, encouraged activity at patient is physically able  Always consult a physician prior to starting an exercise program  Recommend 20-30 minutes daily      Patient Stated Goal: "I will eat 3 meals and 3 snacks each day, including protein at each"    Diabetes Self Management Support Plan outside of ongoing care: Spouse/Family    Learner/s Present:Learners Present: Patient   Barriers to Learning/Change: No Barriers  Expected Compliance: very good    Date to report blood sugars: Thursday, 8/5/21  F/u: PRN or if need to start medication    Begin Time: 9:00 am  End Time: 10:00 am    It was a pleasure working with them today  Please feel free to call with any questions or concerns  Ric Yepez RD, ADINAN  Diabetes Educator  [facility] Maternal Fetal Medicine  Diabetes in Pregnancy Program  [address] [facility] [address] [facility]  Virtual Regular Visit    Verification of patient location:    Patient is located in the following state in which I hold an active license [place]      Assessment/Plan:    Problem List Items Addressed This Visit     None      Visit Diagnoses     Gestational diabetes mellitus (GDM) in third trimester, gestational diabetes method of control unspecified    -  Primary    32 weeks gestation of pregnancy                   Reason for visit is   Chief Complaint   Patient presents with    Virtual Regular Visit        Encounter provider Ric Yepez    Provider located at [address] [place] [id number] [phone]      Recent Visits  No visits were found meeting these conditions  Showing recent visits within past 7 days and meeting all other requirements  Today's Visits  Date Type Provider Dept   07/30/21 Telemedicine [facility]   Showing today's visits and meeting all other requirements  Future Appointments  No visits were found meeting these conditions  Showing future appointments within next 150 days and meeting all other requirements       The patient was identified by name and date of birth  Linda Quintero was informed that this is a telemedicine visit and that the visit is being conducted through [address] [place] [address] Road Now and patient was informed that this is a secure, HIPAA-compliant platform  She agrees to proceed     My office door was closed  No one else was in the room  She acknowledged consent and understanding of privacy and security of the video platform  The patient has agreed to participate and understands they can discontinue the visit at any time  Patient is aware this is a billable service  I spent 60 minutes directly with the patient during this visit    VIRTUAL VISIT DISCLAIMER      Santina Sacks verbally agrees to participate in [employer]  Pt is aware that [employer] could be limited without vital signs or the ability to perform a full hands-on physical exam  Olga Lee understands she or the provider may request at any time to terminate the video visit and request the patient to seek care or treatment in person

## 2021-07-30 NOTE — PROGRESS NOTES
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 no  Family members with diabetes: Mother and Father    Pregnancy History:  How many total pregnancies have you had? Other 10  How many children do you have? 4  Have you had a baby weighing larger than 9 lbs? no  Are you having swelling or fluid retention? Sometimes how busy I am or hot out  Have you been placed on bedrest? no    Physical Activity:   Do you exercise? no    Nutrition Questionnaire: How many meals do you eat daily? 2  List times of meals: Breakfast: 9-10 am/ Lunch: skips/ Dinner: 5-6  How many snacks do you eat daily? Other no set snack times grazes all day   List times of snacks: Other varies depending on schedule has multiple [facility]  What type of diet are you following at home? Other is on regular diet now, was following keto style diet before pregnancy  Do you have special or ethnic dietary preferences? no  Do you have any food allergies or intolerances? no      Learning preferences: How do you learn best? Reading  How do you rate your health? Good  Who is your primary support person? Spouse  How do you cope with stress? crouchet  Do you have any cultural or Confucianist beliefs we should be aware of? no  How do you feel the diabetes diagnosis will affect the rest of your pregnancy?  Ensure I eat more regularly  Do you receive WIC or food stamps? no

## 2021-08-02 ENCOUNTER — TELEPHONE (OUTPATIENT)
Dept: PERINATAL CARE | Facility: CLINIC | Age: 36
End: 2021-08-02

## 2021-08-02 NOTE — TELEPHONE ENCOUNTER
Called patient to schedule follow up appointment:  30 MIN VIRTUAL APPT WITH DIETICIAN IN 2 WEEKS    Left voicemail request patient to call back to schedule at [phone]

## 2021-08-03 ENCOUNTER — TELEPHONE (OUTPATIENT)
Dept: PERINATAL CARE | Facility: CLINIC | Age: 36
End: 2021-08-03

## 2021-08-05 ENCOUNTER — ROUTINE PRENATAL (OUTPATIENT)
Dept: PERINATAL CARE | Facility: OTHER | Age: 36
End: 2021-08-05
Payer: COMMERCIAL

## 2021-08-05 ENCOUNTER — APPOINTMENT (OUTPATIENT)
Dept: PERINATAL CARE | Facility: OTHER | Age: 36
End: 2021-08-05
Payer: COMMERCIAL

## 2021-08-05 VITALS
HEIGHT: 61 IN | WEIGHT: 228.4 LBS | DIASTOLIC BLOOD PRESSURE: 72 MMHG | BODY MASS INDEX: 43.12 KG/M2 | HEART RATE: 86 BPM | SYSTOLIC BLOOD PRESSURE: 109 MMHG

## 2021-08-05 DIAGNOSIS — Z3A.32 32 WEEKS GESTATION OF PREGNANCY: ICD-10-CM

## 2021-08-05 DIAGNOSIS — O99.810 ABNORMAL GLUCOSE AFFECTING PREGNANCY: ICD-10-CM

## 2021-08-05 DIAGNOSIS — D68.61 ANTIPHOSPHOLIPID SYNDROME DURING PREGNANCY (HCC): ICD-10-CM

## 2021-08-05 DIAGNOSIS — O44.42 LOW-LYING PLACENTA WITHOUT HEMORRHAGE, SECOND TRIMESTER: Primary | ICD-10-CM

## 2021-08-05 DIAGNOSIS — Z34.90 PREGNANCY AT EARLY STAGE: ICD-10-CM

## 2021-08-05 DIAGNOSIS — O99.119 ANTIPHOSPHOLIPID SYNDROME DURING PREGNANCY (HCC): ICD-10-CM

## 2021-08-05 DIAGNOSIS — O09.523 ELDERLY MULTIGRAVIDA, THIRD TRIMESTER: ICD-10-CM

## 2021-08-05 DIAGNOSIS — O40.3XX0 POLYHYDRAMNIOS AFFECTING PREGNANCY IN THIRD TRIMESTER: ICD-10-CM

## 2021-08-05 PROCEDURE — 76817 TRANSVAGINAL US OBSTETRIC: CPT | Performed by: OBSTETRICS & GYNECOLOGY

## 2021-08-05 PROCEDURE — 76816 OB US FOLLOW-UP PER FETUS: CPT | Performed by: OBSTETRICS & GYNECOLOGY

## 2021-08-05 PROCEDURE — 99214 OFFICE O/P EST MOD 30 MIN: CPT | Performed by: OBSTETRICS & GYNECOLOGY

## 2021-08-05 PROCEDURE — 76818 FETAL BIOPHYS PROFILE W/NST: CPT | Performed by: OBSTETRICS & GYNECOLOGY

## 2021-08-05 NOTE — PROGRESS NOTES
Ultrasound Probe Disinfection    A transvaginal ultrasound was performed  Prior to use, disinfection was performed with High Level Disinfection Process ([employer])  Probe serial number M1: [id number] was used        [facility]  08/05/21  2:07 PM

## 2021-08-05 NOTE — PROGRESS NOTES
NST procedure and expected outcome explained to patient  Daily fetal kick count discussed with handout given  Patient verbalized understanding of all and was receptive      Tao Ingram RN

## 2021-08-05 NOTE — PATIENT INSTRUCTIONS
Kick Counts in Pregnancy   AMBULATORY CARE:   Kick counts  measure how much your baby is moving in your womb  A kick from your baby can be felt as a twist, turn, swish, roll, or jab  It is common to feel your baby kicking at 26 to 28 weeks of pregnancy  You may feel your baby kick as early as 20 weeks of pregnancy  You may want to start counting at 28 weeks  Contact your healthcare provider immediately if:   · You feel a change in the number of kicks or movements of your baby  · You feel fewer than 10 kicks within 2 hours  · You have questions or concerns about your baby's movements  Why measure kick counts:  Your baby's movement may provide information about your baby's health  He or she may move less, or not at all, if there are problems  Your baby may move less if he or she is not getting enough oxygen or nutrition from the placenta  Do not smoke while you are pregnant  Smoking decreases the amount of oxygen that gets to your baby  Talk to your healthcare provider if you need help to quit smoking  Tell your healthcare provider as soon as you feel a change in your baby's movements  When to measure kick counts:   · Measure kick counts at the same time every day  · Measure kick counts when your baby is awake and most active  Your baby may be most active in the evening  How to measure kick counts:  Check that your baby is awake before you measure kick counts  You can wake up your baby by lightly pushing on your belly, walking, or drinking something cold  Your healthcare provider may tell you different ways to measure kick counts  You may be told to do the following:  · Use a chart or clock to keep track of the time you start and finish counting  · Sit in a chair or lie on your left side  · Place your hands on the largest part of your belly  · Count until you reach 10 kicks  Write down how much time it takes to count 10 kicks  · It may take 30 minutes to 2 hours to count 10 kicks  It should not take more than 2 hours to count 10 kicks  Follow up with your healthcare provider as directed:  Write down your questions so you remember to ask them during your visits  © Copyright [employer] 2021 Information is for End User's use only and may not be sold, redistributed or otherwise used for commercial purposes  All illustrations and images included in CareNotes® are the copyrighted property of A D A M , Inc  or [facility] In[employer]   The above information is an  only  It is not intended as medical advice for individual conditions or treatments  Talk to your doctor, nurse or pharmacist before following any medical regimen to see if it is safe and effective for you  Kick Counts in Pregnancy   AMBULATORY CARE:   Kick counts  measure how much your baby is moving in your womb  A kick from your baby can be felt as a twist, turn, swish, roll, or jab  It is common to feel your baby kicking at 26 to 28 weeks of pregnancy  You may feel your baby kick as early as 20 weeks of pregnancy  You may want to start counting at 28 weeks  Contact your healthcare provider immediately if:   · You feel a change in the number of kicks or movements of your baby  · You feel fewer than 10 kicks within 2 hours  · You have questions or concerns about your baby's movements  Why measure kick counts:  Your baby's movement may provide information about your baby's health  He or she may move less, or not at all, if there are problems  Your baby may move less if he or she is not getting enough oxygen or nutrition from the placenta  Do not smoke while you are pregnant  Smoking decreases the amount of oxygen that gets to your baby  Talk to your healthcare provider if you need help to quit smoking  Tell your healthcare provider as soon as you feel a change in your baby's movements  When to measure kick counts:   · Measure kick counts at the same time every day        · Measure kick counts when your baby is awake and most active  Your baby may be most active in the evening  How to measure kick counts:  Check that your baby is awake before you measure kick counts  You can wake up your baby by lightly pushing on your belly, walking, or drinking something cold  Your healthcare provider may tell you different ways to measure kick counts  You may be told to do the following:  · Use a chart or clock to keep track of the time you start and finish counting  · Sit in a chair or lie on your left side  · Place your hands on the largest part of your belly  · Count until you reach 10 kicks  Write down how much time it takes to count 10 kicks  · It may take 30 minutes to 2 hours to count 10 kicks  It should not take more than 2 hours to count 10 kicks  Follow up with your healthcare provider as directed:  Write down your questions so you remember to ask them during your visits  © Copyright [employer] 2021 Information is for End User's use only and may not be sold, redistributed or otherwise used for commercial purposes  All illustrations and images included in CareNotes® are the copyrighted property of [employer]  or [facility] In[employer]   The above information is an  only  It is not intended as medical advice for individual conditions or treatments  Talk to your doctor, nurse or pharmacist before following any medical regimen to see if it is safe and effective for you

## 2021-08-09 ENCOUNTER — TELEPHONE (OUTPATIENT)
Dept: PERINATAL CARE | Facility: CLINIC | Age: 36
End: 2021-08-09

## 2021-08-09 NOTE — TELEPHONE ENCOUNTER
Pt called office stating she ran out of Lovenox and DR Tariq Aviles was to reorder  She states she went to the pharmacy and was told they did not have the reorder  PT is requesting this be submitted immediately as she does not have any further medication  PT aware there is an order from DR Tariq Aviles in the system and it was sent electronically  I called pts [address] to place verbal order  Wale Hyman Pharmacist took the verbal order of pts Lovenox (inject  0 4ml [40 mg total] under the skin daily  Dispense Quantity 12 ML with 3 refills) and states he will refill the prescription  I contacted pt to inform her of above  She was very appreciative and declined further needs at this time

## 2021-08-10 ENCOUNTER — ROUTINE PRENATAL (OUTPATIENT)
Dept: PERINATAL CARE | Facility: OTHER | Age: 36
End: 2021-08-10
Payer: COMMERCIAL

## 2021-08-10 VITALS
SYSTOLIC BLOOD PRESSURE: 119 MMHG | HEIGHT: 61 IN | BODY MASS INDEX: 43.2 KG/M2 | WEIGHT: 228.8 LBS | DIASTOLIC BLOOD PRESSURE: 70 MMHG | HEART RATE: 88 BPM

## 2021-08-10 DIAGNOSIS — D68.61 ANTIPHOSPHOLIPID SYNDROME DURING PREGNANCY (HCC): ICD-10-CM

## 2021-08-10 DIAGNOSIS — O99.119 ANTIPHOSPHOLIPID SYNDROME DURING PREGNANCY (HCC): ICD-10-CM

## 2021-08-10 DIAGNOSIS — Z3A.33 33 WEEKS GESTATION OF PREGNANCY: Primary | ICD-10-CM

## 2021-08-10 PROCEDURE — 59025 FETAL NON-STRESS TEST: CPT | Performed by: OBSTETRICS & GYNECOLOGY

## 2021-08-10 NOTE — PROGRESS NOTES
Nonstress testing was performed for the indication of antiphospholipid antibody syndrome which was reactive

## 2021-08-12 PROBLEM — Z34.83 PRENATAL CARE, SUBSEQUENT PREGNANCY, THIRD TRIMESTER: Status: ACTIVE | Noted: 2021-08-12

## 2021-08-12 NOTE — ASSESSMENT & PLAN NOTE
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

## 2021-08-12 NOTE — PATIENT INSTRUCTIONS
Valuable Online Resource:     [facility] pregnancy essential guide    http://pratima gay/      On the right side of the screen is a 50 page guide providing valuable information about your entire pregnancy  On the left hand side of the site you will see several other links to great information and resources that [facility] offers     If you click on the tab that says "Pregnancy and Birth Packet" this opens another 150 page guide to labor and delivery information as well as breast feeding information,  care, pediatricians, car seat safety and much more     The  [facility] and [address] tab has a virtual tour of the new L&D unit, as well as valuable information about classes that are offered, breast feeding support, support groups and much more  Click around and enjoy all we have to offer! Please note that all information in regards to locations and visiting hours have not been updated due to COVID    We only deliver our babies at one location which is at [facility] [place] [address]  Pregnancy at 28 to 45 Weeks   AMBULATORY CARE:   What changes are happening to your body: You are considered full term at the beginning of 37 weeks  Your breathing may be easier if your baby has moved down into a head-down position  You may need to urinate more often because the baby may be pressing on your bladder  You may also feel more discomfort and get tired easily  Seek care immediately if:   · You develop a severe headache that does not go away  · You have new or increased vision changes, such as blurred or spotted vision  · You have new or increased swelling in your face or hands  · You have vaginal spotting or bleeding  · Your water broke or you feel warm water gushing or trickling from your vagina  Contact your healthcare provider if:   · You have more than 5 contractions in 1 hour      · You notice any changes in your baby's movements  · You have abdominal cramps, pressure, or tightening  · You have a change in vaginal discharge  · You have chills or a fever  · You have vaginal itching, burning, or pain  · You have yellow, green, white, or foul-smelling vaginal discharge  · You have pain or burning when you urinate, less urine than usual, or pink or bloody urine  · You have questions or concerns about your condition or care  How to care for yourself at this stage of your pregnancy:   · Eat a variety of healthy foods  Healthy foods include fruits, vegetables, whole-grain breads, low-fat dairy foods, beans, lean meats, and fish  Drink liquids as directed  Ask how much liquid to drink each day and which liquids are best for you  Limit caffeine to less than 200 milligrams each day  Limit your intake of fish to 2 servings each week  Choose fish low in mercury such as canned light tuna, shrimp, salmon, cod, or tilapia  Do not  eat fish high in mercury such as swordfish, tilefish, iesha mackerel, and shark  · Take prenatal vitamins as directed  Your need for certain vitamins and minerals, such as folic acid, increases during pregnancy  Prenatal vitamins provide some of the extra vitamins and minerals you need  Prenatal vitamins may also help to decrease the risk of certain birth defects  · Rest as needed  Put your feet up if you have swelling in your ankles and feet  · Do not smoke  Smoking increases your risk of a miscarriage and other health problems during your pregnancy  Smoking can cause your baby to be born early or weigh less at birth  Ask your healthcare provider for information if you need help quitting  · Do not drink alcohol  Alcohol passes from your body to your baby through the placenta  It can affect your baby's brain development and cause fetal alcohol syndrome (FAS)  FAS is a group of conditions that causes mental, behavior, and growth problems      · Talk to your healthcare provider before you take any medicines  Many medicines may harm your baby if you take them when you are pregnant  Do not take any medicines, vitamins, herbs, or supplements without first talking to your healthcare provider  Never use illegal or street drugs (such as marijuana or cocaine) while you are pregnant  · Talk to your healthcare provider before you travel  You may not be able to travel in an airplane after 36 weeks  He may also recommend that you avoid long road trips  Safety tips during pregnancy:   · Avoid hot tubs and saunas  Do not use a hot tub or sauna while you are pregnant, especially during your first trimester  Hot tubs and saunas may raise your baby's temperature and increase the risk of birth defects  · Avoid toxoplasmosis  This is an infection caused by eating raw meat or being around infected cat feces  It can cause birth defects, miscarriages, and other problems  Wash your hands after you touch raw meat  Make sure any meat is well-cooked before you eat it  Avoid raw eggs and unpasteurized milk  Use gloves or ask someone else to clean your cat's litter box while you are pregnant  · Ask your healthcare provider about travel  The most comfortable time to travel is during the second trimester  Ask your healthcare provider if you can travel after 36 weeks  You may not be able to travel in an airplane after 36 weeks  He may also recommend that you avoid long road trips  Changes that are happening with your baby:  By 38 weeks, your baby may weigh between 6 and 9 pounds  Your baby may be about 14 inches long from the top of the head to the rump (baby's bottom)  Your baby hears well enough to know your voice  As your baby gets larger, you may feel fewer kicks and more stretching and rolling  Your baby may move into a head-down position  Your baby will also rest lower in your abdomen  What you need to know about prenatal care:   Your healthcare provider will check your blood pressure and weight  You may also need the following:  · A urine test  may also be done to check for sugar and protein  These can be signs of gestational diabetes or infection  Protein in your urine may also be a sign of preeclampsia  Preeclampsia is a condition that can develop during week 20 or later of your pregnancy  It causes high blood pressure, and it can cause problems with your kidneys and other organs  · A blood test  may be done to check for anemia (low iron level)  · A Tdap vaccine  may be recommended by your healthcare provider  · A group B strep test  is a test that is done to check for group B strep infection  Group B strep is a type of bacteria that may be found in the vagina or rectum  It can be passed to your baby during delivery if you have it  Your healthcare provider will take swab your vagina or rectum and send the sample to the lab for tests  · Fundal height  is a measurement of your uterus to check your baby's growth  This number is usually the same as the number of weeks that you have been pregnant  Your healthcare provider may also check your baby's position  · Your baby's heart rate  will be checked  © Copyright [employer] 2021 Information is for End User's use only and may not be sold, redistributed or otherwise used for commercial purposes  All illustrations and images included in CareNotes® are the copyrighted property of [employer]  or [employer]  The above information is an  only  It is not intended as medical advice for individual conditions or treatments  Talk to your doctor, nurse or pharmacist before following any medical regimen to see if it is safe and effective for you

## 2021-08-13 ENCOUNTER — ROUTINE PRENATAL (OUTPATIENT)
Dept: OBGYN CLINIC | Facility: CLINIC | Age: 36
End: 2021-08-13

## 2021-08-13 VITALS
SYSTOLIC BLOOD PRESSURE: 110 MMHG | WEIGHT: 226 LBS | BODY MASS INDEX: 42.67 KG/M2 | HEIGHT: 61 IN | DIASTOLIC BLOOD PRESSURE: 78 MMHG

## 2021-08-13 DIAGNOSIS — O24.419 GESTATIONAL DIABETES MELLITUS (GDM) IN THIRD TRIMESTER, GESTATIONAL DIABETES METHOD OF CONTROL UNSPECIFIED: ICD-10-CM

## 2021-08-13 DIAGNOSIS — Z34.83 PRENATAL CARE, SUBSEQUENT PREGNANCY, THIRD TRIMESTER: Primary | ICD-10-CM

## 2021-08-13 LAB
SL AMB  POCT GLUCOSE, UA: NEGATIVE
SL AMB POCT URINE PROTEIN: NEGATIVE

## 2021-08-13 PROCEDURE — PNV: Performed by: OBSTETRICS & GYNECOLOGY

## 2021-08-13 NOTE — PROGRESS NOTES
Patient is 34 weeks 0 days she reports positive fetal movements  Patient reports that she is schedule for repeat   With Dr Lavell Trujillo at 12:00pm on 2021  Patient reports NO lost of fluids and No contractions at all   Patient reports that she is doing well so in the pregnancy

## 2021-08-13 NOTE — PROGRESS NOTES
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

## 2021-08-16 ENCOUNTER — ROUTINE PRENATAL (OUTPATIENT)
Dept: PERINATAL CARE | Facility: CLINIC | Age: 36
End: 2021-08-16
Payer: COMMERCIAL

## 2021-08-16 VITALS
BODY MASS INDEX: 42.86 KG/M2 | SYSTOLIC BLOOD PRESSURE: 124 MMHG | DIASTOLIC BLOOD PRESSURE: 68 MMHG | HEART RATE: 92 BPM | HEIGHT: 61 IN | WEIGHT: 227 LBS

## 2021-08-16 DIAGNOSIS — Z3A.34 34 WEEKS GESTATION OF PREGNANCY: ICD-10-CM

## 2021-08-16 DIAGNOSIS — O40.3XX0 POLYHYDRAMNIOS AFFECTING PREGNANCY IN THIRD TRIMESTER: ICD-10-CM

## 2021-08-16 DIAGNOSIS — D68.61 ANTIPHOSPHOLIPID SYNDROME DURING PREGNANCY (HCC): Primary | ICD-10-CM

## 2021-08-16 DIAGNOSIS — E66.01 MATERNAL MORBID OBESITY IN THIRD TRIMESTER, ANTEPARTUM (HCC): ICD-10-CM

## 2021-08-16 DIAGNOSIS — O99.119 ANTIPHOSPHOLIPID SYNDROME DURING PREGNANCY (HCC): Primary | ICD-10-CM

## 2021-08-16 DIAGNOSIS — O99.213 MATERNAL MORBID OBESITY IN THIRD TRIMESTER, ANTEPARTUM (HCC): ICD-10-CM

## 2021-08-16 DIAGNOSIS — O24.410 DIET CONTROLLED GESTATIONAL DIABETES MELLITUS (GDM) IN THIRD TRIMESTER: ICD-10-CM

## 2021-08-16 PROCEDURE — 59025 FETAL NON-STRESS TEST: CPT | Performed by: OBSTETRICS & GYNECOLOGY

## 2021-08-16 NOTE — PROGRESS NOTES
Pt  Reported active fetal movement at home between visit  Daily fetal kick count reviewed and emphasized  Patient verbalized understanding of all and was receptive      Isrrael Correa RN

## 2021-08-16 NOTE — PROGRESS NOTES
NST is reactive   Trace Baires MD [facility]  Progress Note     Di[facility] Patient Status:  Inpatient    1977 MRN [id number]   [facility] 6NE-A Attending Orlin Vigil MD   Hosp Day # 3 PCP Wolfgang Brunner     Chief Complaint: CP    S: pt tearful and anxious a --   --   --    ALB 3.9  --   --   --   --   --   --      --  139  --   --   --   --    K 2.9*   < > 3.3*   < > 3.7 3.7 4.2     --  109  --   --   --   --    CO2 25.0  --  22.0  --   --   --   --    ALKPHO 101*  --   --   --   --   --   --    A recently filled script on 3/29  3. Pt very concerned about discharge, SW following  4. Discussed SSRI's in detail, she is refusing as she had hallucinations w/ venlafaxine in the past  6. Smoker  1.  Encouraged cessation, pt plans to stop, no cravings at th

## 2021-08-19 ENCOUNTER — ULTRASOUND (OUTPATIENT)
Dept: PERINATAL CARE | Facility: CLINIC | Age: 36
End: 2021-08-19
Payer: COMMERCIAL

## 2021-08-19 VITALS
BODY MASS INDEX: 42.86 KG/M2 | HEIGHT: 61 IN | DIASTOLIC BLOOD PRESSURE: 85 MMHG | WEIGHT: 227 LBS | SYSTOLIC BLOOD PRESSURE: 133 MMHG | HEART RATE: 89 BPM

## 2021-08-19 DIAGNOSIS — O99.119 ANTIPHOSPHOLIPID SYNDROME DURING PREGNANCY (HCC): Primary | ICD-10-CM

## 2021-08-19 DIAGNOSIS — D68.61 ANTIPHOSPHOLIPID SYNDROME DURING PREGNANCY (HCC): Primary | ICD-10-CM

## 2021-08-19 DIAGNOSIS — O40.3XX0 POLYHYDRAMNIOS AFFECTING PREGNANCY IN THIRD TRIMESTER: ICD-10-CM

## 2021-08-19 DIAGNOSIS — E66.01 MATERNAL MORBID OBESITY IN THIRD TRIMESTER, ANTEPARTUM (HCC): ICD-10-CM

## 2021-08-19 DIAGNOSIS — Z3A.34 34 WEEKS GESTATION OF PREGNANCY: ICD-10-CM

## 2021-08-19 DIAGNOSIS — O99.213 MATERNAL MORBID OBESITY IN THIRD TRIMESTER, ANTEPARTUM (HCC): ICD-10-CM

## 2021-08-19 PROCEDURE — 76818 FETAL BIOPHYS PROFILE W/NST: CPT | Performed by: OBSTETRICS & GYNECOLOGY

## 2021-08-19 PROCEDURE — 76815 OB US LIMITED FETUS(S): CPT | Performed by: OBSTETRICS & GYNECOLOGY

## 2021-08-19 NOTE — LETTER
August 20, 2021     [address] [address]    Patient: Chao Flores   YOB: 1985   Date of Visit: 8/19/2021       Dear Dr Rose Sanchez: Thank you for referring Chao Flores to me for evaluation  Below are my notes for this consultation  If you have questions, please do not hesitate to call me  I look forward to following your patient along with you  Sincerely,        Savannah Marx MD        CC: No Recipients  Savannah Marx MD  8/20/2021 12:48 AM  Sign when Signing Visit   Fetal NST today was difficult to trace continuously likely secondary to the moderate polyhydramnios  BPP was 8/8      Savannah Marx MD

## 2021-08-20 ENCOUNTER — TELEPHONE (OUTPATIENT)
Dept: OBGYN CLINIC | Facility: CLINIC | Age: 36
End: 2021-08-20

## 2021-08-20 NOTE — TELEPHONE ENCOUNTER
Confirmed with l and d -pt is scheduled for  for  12:30 pm, pt to arrive at 10:30 am at [facility] will call with instructions day prior to surgery  with instructions  Pt informed  Pt stated partner lost breast pump form and they need another due to form had code that where pre written on the from for her  to fill it out online  I advised pt I will send this concern to the MA for further advisement and they will be reaching out to her

## 2021-08-20 NOTE — PROGRESS NOTES
Fetal NST today was difficult to trace continuously likely secondary to the moderate polyhydramnios  BPP was 8/8      Karen Mahmood MD

## 2021-08-23 ENCOUNTER — TELEPHONE (OUTPATIENT)
Dept: OBGYN CLINIC | Facility: CLINIC | Age: 36
End: 2021-08-23

## 2021-08-23 NOTE — TELEPHONE ENCOUNTER
Pt informed as directed  Pt agreed to plan of action  Pt asked if she can get soap sooner as she lives [place] hrs from the hospital  Pt informed per l and d grsielle she may purchase hibiclens  Pt agreed to plan of action

## 2021-08-23 NOTE — TELEPHONE ENCOUNTER
OB set to deliver this week and needs to know what day she is supposed to stop her injections    Best number ending in: [phone]

## 2021-08-25 ENCOUNTER — ROUTINE PRENATAL (OUTPATIENT)
Dept: OBGYN CLINIC | Facility: CLINIC | Age: 36
End: 2021-08-25
Payer: COMMERCIAL

## 2021-08-25 VITALS — WEIGHT: 228 LBS | DIASTOLIC BLOOD PRESSURE: 80 MMHG | BODY MASS INDEX: 43.08 KG/M2 | SYSTOLIC BLOOD PRESSURE: 122 MMHG

## 2021-08-25 DIAGNOSIS — O24.410 DIET CONTROLLED GESTATIONAL DIABETES MELLITUS (GDM) IN THIRD TRIMESTER: ICD-10-CM

## 2021-08-25 DIAGNOSIS — E66.01 MATERNAL MORBID OBESITY IN THIRD TRIMESTER, ANTEPARTUM (HCC): ICD-10-CM

## 2021-08-25 DIAGNOSIS — Q51.3 BICORNUATE UTERUS AFFECTING PREGNANCY IN THIRD TRIMESTER, ANTEPARTUM: ICD-10-CM

## 2021-08-25 DIAGNOSIS — O09.523 ELDERLY MULTIGRAVIDA, THIRD TRIMESTER: ICD-10-CM

## 2021-08-25 DIAGNOSIS — Z87.828 H/O RUPTURE OF UTERUS: ICD-10-CM

## 2021-08-25 DIAGNOSIS — O40.3XX0 POLYHYDRAMNIOS AFFECTING PREGNANCY IN THIRD TRIMESTER: ICD-10-CM

## 2021-08-25 DIAGNOSIS — O34.03 BICORNUATE UTERUS AFFECTING PREGNANCY IN THIRD TRIMESTER, ANTEPARTUM: ICD-10-CM

## 2021-08-25 DIAGNOSIS — Z34.93 PREGNANCY, OBSTETRICAL CARE, THIRD TRIMESTER: Primary | ICD-10-CM

## 2021-08-25 DIAGNOSIS — Z98.891 HISTORY OF CESAREAN SECTION: ICD-10-CM

## 2021-08-25 DIAGNOSIS — O99.119 ANTIPHOSPHOLIPID SYNDROME DURING PREGNANCY (HCC): ICD-10-CM

## 2021-08-25 DIAGNOSIS — Z30.2 REQUEST FOR STERILIZATION: ICD-10-CM

## 2021-08-25 DIAGNOSIS — O99.213 MATERNAL MORBID OBESITY IN THIRD TRIMESTER, ANTEPARTUM (HCC): ICD-10-CM

## 2021-08-25 DIAGNOSIS — D68.61 ANTIPHOSPHOLIPID SYNDROME DURING PREGNANCY (HCC): ICD-10-CM

## 2021-08-25 DIAGNOSIS — Z23 NEED FOR TDAP VACCINATION: ICD-10-CM

## 2021-08-25 LAB
SL AMB  POCT GLUCOSE, UA: NORMAL
SL AMB POCT URINE PROTEIN: NORMAL

## 2021-08-25 PROCEDURE — 90715 TDAP VACCINE 7 YRS/> IM: CPT

## 2021-08-25 PROCEDURE — 87150 DNA/RNA AMPLIFIED PROBE: CPT | Performed by: NURSE PRACTITIONER

## 2021-08-25 PROCEDURE — PNV: Performed by: NURSE PRACTITIONER

## 2021-08-25 PROCEDURE — 90471 IMMUNIZATION ADMIN: CPT

## 2021-08-25 NOTE — PATIENT INSTRUCTIONS
Pregnancy at 28 to 38 Weeks   AMBULATORY CARE:   What changes are happening to your body: You are considered full term at the beginning of 37 weeks  Your breathing may be easier if your baby has moved down into a head-down position  You may need to urinate more often because the baby may be pressing on your bladder  You may also feel more discomfort and get tired easily  Seek care immediately if:   · You develop a severe headache that does not go away  · You have new or increased vision changes, such as blurred or spotted vision  · You have new or increased swelling in your face or hands  · You have vaginal spotting or bleeding  · Your water broke or you feel warm water gushing or trickling from your vagina  Contact your healthcare provider if:   · You have more than 5 contractions in 1 hour  · You notice any changes in your baby's movements  · You have abdominal cramps, pressure, or tightening  · You have a change in vaginal discharge  · You have chills or a fever  · You have vaginal itching, burning, or pain  · You have yellow, green, white, or foul-smelling vaginal discharge  · You have pain or burning when you urinate, less urine than usual, or pink or bloody urine  · You have questions or concerns about your condition or care  How to care for yourself at this stage of your pregnancy:   · Eat a variety of healthy foods  Healthy foods include fruits, vegetables, whole-grain breads, low-fat dairy foods, beans, lean meats, and fish  Drink liquids as directed  Ask how much liquid to drink each day and which liquids are best for you  Limit caffeine to less than 200 milligrams each day  Limit your intake of fish to 2 servings each week  Choose fish low in mercury such as canned light tuna, shrimp, salmon, cod, or tilapia  Do not  eat fish high in mercury such as swordfish, tilefish, iesha mackerel, and shark  · Take prenatal vitamins as directed    Your need for certain vitamins and minerals, such as folic acid, increases during pregnancy  Prenatal vitamins provide some of the extra vitamins and minerals you need  Prenatal vitamins may also help to decrease the risk of certain birth defects  · Rest as needed  Put your feet up if you have swelling in your ankles and feet  · Do not smoke  Smoking increases your risk of a miscarriage and other health problems during your pregnancy  Smoking can cause your baby to be born early or weigh less at birth  Ask your healthcare provider for information if you need help quitting  · Do not drink alcohol  Alcohol passes from your body to your baby through the placenta  It can affect your baby's brain development and cause fetal alcohol syndrome (FAS)  FAS is a group of conditions that causes mental, behavior, and growth problems  · Talk to your healthcare provider before you take any medicines  Many medicines may harm your baby if you take them when you are pregnant  Do not take any medicines, vitamins, herbs, or supplements without first talking to your healthcare provider  Never use illegal or street drugs (such as marijuana or cocaine) while you are pregnant  · Talk to your healthcare provider before you travel  You may not be able to travel in an airplane after 36 weeks  He may also recommend that you avoid long road trips  Safety tips during pregnancy:   · Avoid hot tubs and saunas  Do not use a hot tub or sauna while you are pregnant, especially during your first trimester  Hot tubs and saunas may raise your baby's temperature and increase the risk of birth defects  · Avoid toxoplasmosis  This is an infection caused by eating raw meat or being around infected cat feces  It can cause birth defects, miscarriages, and other problems  Wash your hands after you touch raw meat  Make sure any meat is well-cooked before you eat it  Avoid raw eggs and unpasteurized milk   Use gloves or ask someone else to clean your cat's litter box while you are pregnant  · Ask your healthcare provider about travel  The most comfortable time to travel is during the second trimester  Ask your healthcare provider if you can travel after 36 weeks  You may not be able to travel in an airplane after 36 weeks  He may also recommend that you avoid long road trips  Changes that are happening with your baby:  By 38 weeks, your baby may weigh between 6 and 9 pounds  Your baby may be about 14 inches long from the top of the head to the rump (baby's bottom)  Your baby hears well enough to know your voice  As your baby gets larger, you may feel fewer kicks and more stretching and rolling  Your baby may move into a head-down position  Your baby will also rest lower in your abdomen  What you need to know about prenatal care: Your healthcare provider will check your blood pressure and weight  You may also need the following:  · A urine test  may also be done to check for sugar and protein  These can be signs of gestational diabetes or infection  Protein in your urine may also be a sign of preeclampsia  Preeclampsia is a condition that can develop during week 20 or later of your pregnancy  It causes high blood pressure, and it can cause problems with your kidneys and other organs  · A blood test  may be done to check for anemia (low iron level)  · A Tdap vaccine  may be recommended by your healthcare provider  · A group B strep test  is a test that is done to check for group B strep infection  Group B strep is a type of bacteria that may be found in the vagina or rectum  It can be passed to your baby during delivery if you have it  Your healthcare provider will take swab your vagina or rectum and send the sample to the lab for tests  · Fundal height  is a measurement of your uterus to check your baby's growth  This number is usually the same as the number of weeks that you have been pregnant   Your healthcare provider may also check your baby's position  · Your baby's heart rate  will be checked  © Copyright [employer] 2021 Information is for End User's use only and may not be sold, redistributed or otherwise used for commercial purposes  All illustrations and images included in CareNotes® are the copyrighted property of [employer] D [employer] M [employer]  or Jaime Daily  The above information is an  only  It is not intended as medical advice for individual conditions or treatments  Talk to your doctor, nurse or pharmacist before following any medical regimen to see if it is safe and effective for you

## 2021-08-25 NOTE — PROGRESS NOTES
Pt is feeling fetal movement  No LOF or vaginal bleedin  Adminstered in pt LD VS given pt had no questions

## 2021-08-25 NOTE — PROGRESS NOTES
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, antepartum    H/O rupture of uterus       Other    History of  section    Request for sterilization    Pregnancy, obstetrical care, third trimester - Primary    Relevant Orders    POCT urine dip    Elderly multigravida, third trimester    [facility] mor[facility] in third trimester, [facility]    Polyhydramnios affecting pregnancy in third trimester      Other Visit Diagnoses     Need for Tdap vaccination        Relevant Orders    TDAP VACCINE GREATER THAN OR EQUAL TO 8YO IM        Feels well  Denies LOF/CTX/VB  No concerns  Discussed fetal kick counting  She reports excellent blood sugars  She has Hibiclens   with BTL scheduled in 2 days  Tdap given today  Aware of Lovenox instructions   GBS done today

## 2021-08-26 ENCOUNTER — PRE-BIRTH ASSESSMENT (OUTPATIENT)
Dept: LABOR AND DELIVERY | Facility: HOSPITAL | Age: 36
End: 2021-08-26

## 2021-08-26 ENCOUNTER — ANESTHESIA EVENT (INPATIENT)
Dept: LABOR AND DELIVERY | Facility: HOSPITAL | Age: 36
End: 2021-08-26
Payer: COMMERCIAL

## 2021-08-26 NOTE — PRE-PROCEDURE INSTRUCTIONS
Phone call to patient for pre-operative instructions prior to   Voice mail was left with the following instructions:    Pt was instructed to arrive@ 1030, 2 hours before her [id number] OR time  (If the patient is RH negative, she should be told to come in 2 1/2 hours before scheduled OR)    Pt instructed to remain NPO after midnight  *This includes gum, water and hard candy  *If patient takes AM meds (e g hypertensive meds) they may take these meds with a sip of water  *This should not include medications like prenatal vitamins Asprin or colace       Pt was instructed to buy and use a pre-surgical wash containing chlorhexidine the night before and the morning of her scheduled  and to wear clean clothing after the wash  Pt was advised not to wear any jewelry  Pt was requested to leave her large suitcases or bags locked in the car until she is assigned a post partum room  Pt is advised that she may have 1 support person in the OR/PACU area and of the current visiting policy for post partum

## 2021-08-26 NOTE — DISCHARGE INSTRUCTIONS
Section   WHAT YOU SHOULD KNOW:   A  delivery, or , is abdominal surgery to deliver your baby  There are many reasons you may need a   · A  may be scheduled before labor if you had a  with your last baby  It may be scheduled if your baby is not positioned normally, or you are pregnant with more than 1 baby  · Your caregiver may perform an emergency  during labor to prevent life-threatening complications for you or your baby  A  may be done if your cervix does not dilate after several hours of active labor  · Other reasons for a  include maternal infections and problems with the placenta  AFTER YOU LEAVE:   Medicines:   · Prescription pain medicine  may be given  Ask how to take this medicine safely  · Acetaminophen  decreases pain and fever  It is available without a doctor's order  Ask how much to take and how often to take it  Follow directions  Acetaminophen can cause liver damage if not taken correctly  · NSAIDs  help decrease swelling and pain or fever  This medicine is available with or without a doctor's order  NSAIDs can cause stomach bleeding or kidney problems in certain people  If you take blood thinner medicine, always ask your obstetrician if NSAIDs are safe for you  Always read the medicine label and follow directions  · Take your medicine as directed  Contact your obstetrician (OB) if you think your medicine is not helping or if you have side effects  Tell him if you are allergic to any medicine  Keep a list of the medicines, vitamins, and herbs you take  Include the amounts, and when and why you take them  Bring the list or the pill bottles to follow-up visits  Carry your medicine list with you in case of an emergency  Follow up with your OB as directed: You may need to return to have your stitches or staples removed  Write down your questions so you remember to ask them during your visits    Wound care:  Carefully wash your wound with soap and water every day  Keep your wound clean and dry  Wear loose, comfortable clothes that do not rub against your wound  Ask your OB about bathing and showering  Drink plenty of liquids: You can lower your risk for a blood clot if you drink plenty of liquids  Ask how much liquid to drink each day and which liquids are best for you  Limit activity until you have fully recovered from surgery:   · Ask when it is safe for you to drive, walk up stairs, lift heavy objects, and have sex  · Ask when it is okay to exercise, and what types of exercise to do  Start slowly and do more as you get stronger  Contact your OB if:   · You have heavy vaginal bleeding that fills 1 or more sanitary pads in 1 hour  · You have a fever  · Your incision is swollen, red, or draining pus  · You have questions or concerns about yourself or your baby  Seek care immediately or call 911 if:   · Blood soaks through your bandage  · Your stitches come apart  · You feel lightheaded, short of breath, and have chest pain  · You cough up blood  · Your arm or leg feels warm, tender, and painful  It may look swollen and red  © 2014 [address] is for End User's use only and may not be sold, redistributed or otherwise used for commercial purposes  All illustrations and images included in CareNotes® are the copyrighted property of [employer] A M [employer] Inc  or Taran Butcher  The above information is an  only  It is not intended as medical advice for individual conditions or treatments  Talk to your doctor, nurse or pharmacist before following any medical regimen to see if it is safe and effective for you

## 2021-08-27 ENCOUNTER — HOSPITAL ENCOUNTER (INPATIENT)
Facility: HOSPITAL | Age: 36
LOS: 2 days | Discharge: HOME | End: 2021-08-29
Attending: STUDENT IN AN ORGANIZED HEALTH CARE EDUCATION/TRAINING PROGRAM | Admitting: STUDENT IN AN ORGANIZED HEALTH CARE EDUCATION/TRAINING PROGRAM
Payer: COMMERCIAL

## 2021-08-27 ENCOUNTER — ANESTHESIA (INPATIENT)
Dept: LABOR AND DELIVERY | Facility: HOSPITAL | Age: 36
End: 2021-08-27
Payer: COMMERCIAL

## 2021-08-27 DIAGNOSIS — S37.69XA: ICD-10-CM

## 2021-08-27 DIAGNOSIS — O34.219 PREVIOUS CESAREAN SECTION COMPLICATING PREGNANCY: ICD-10-CM

## 2021-08-27 DIAGNOSIS — Z98.891 STATUS POST REPEAT LOW TRANSVERSE CESAREAN SECTION: ICD-10-CM

## 2021-08-27 DIAGNOSIS — Z34.93 PREGNANCY, OBSTETRICAL CARE, THIRD TRIMESTER: Primary | ICD-10-CM

## 2021-08-27 PROBLEM — M35.00 SJOGREN'S DISEASE (HCC): Status: ACTIVE | Noted: 2021-08-27

## 2021-08-27 PROBLEM — F41.9 ANXIETY: Status: ACTIVE | Noted: 2021-08-27

## 2021-08-27 LAB
ABO GROUP BLD: NORMAL
BASE EXCESS BLDCOA CALC-SCNC: -3.3 MMOL/L (ref 3–11)
BASE EXCESS BLDCOV CALC-SCNC: -2.4 MMOL/L (ref 1–9)
BLD GP AB SCN SERPL QL: NEGATIVE
ERYTHROCYTE [DISTWIDTH] IN BLOOD BY AUTOMATED COUNT: 14.5 % (ref 11.6–15.1)
GLUCOSE SERPL-MCNC: 65 MG/DL (ref 65–140)
GLUCOSE SERPL-MCNC: 73 MG/DL (ref 65–140)
GP B STREP DNA SPEC QL NAA+PROBE: NEGATIVE
HCO3 BLDCOA-SCNC: 23.1 MMOL/L (ref 17.3–27.3)
HCO3 BLDCOV-SCNC: 22.9 MMOL/L (ref 12.2–28.6)
HCT VFR BLD AUTO: 35.7 % (ref 34.8–46.1)
HGB BLD-MCNC: 11.9 G/DL (ref 11.5–15.4)
HOLD SPECIMEN: NORMAL
MCH RBC QN AUTO: 30.7 PG (ref 26.8–34.3)
MCHC RBC AUTO-ENTMCNC: 33.3 G/DL (ref 31.4–37.4)
MCV RBC AUTO: 92 FL (ref 82–98)
O2 CT VFR BLDCOA CALC: 5.3 ML/DL
OXYHGB MFR BLDCOA: 27.5 %
OXYHGB MFR BLDCOV: 55 %
PCO2 BLDCOA: 46.8 MM[HG] (ref 30–60)
PCO2 BLDCOV: 41.3 MM HG (ref 27–43)
PH BLDCOA: 7.31 [PH] (ref 7.23–7.43)
PH BLDCOV: 7.36 [PH] (ref 7.19–7.49)
PLATELET # BLD AUTO: 208 THOUSANDS/UL (ref 149–390)
PMV BLD AUTO: 9.6 FL (ref 8.9–12.7)
PO2 BLDCOA: 13.4 MM HG (ref 5–25)
PO2 BLDCOV: 22.2 MM HG (ref 15–45)
RBC # BLD AUTO: 3.88 MILLION/UL (ref 3.81–5.12)
RH BLD: POSITIVE
SAO2 % BLDCOV: 10.8 ML/DL
SPECIMEN EXPIRATION DATE: NORMAL
WBC # BLD AUTO: 8.47 THOUSAND/UL (ref 4.31–10.16)

## 2021-08-27 PROCEDURE — 86592 SYPHILIS TEST NON-TREP QUAL: CPT | Performed by: OBSTETRICS & GYNECOLOGY

## 2021-08-27 PROCEDURE — 4A1HXCZ MONITORING OF PRODUCTS OF CONCEPTION, CARDIAC RATE, EXTERNAL APPROACH: ICD-10-PCS | Performed by: STUDENT IN AN ORGANIZED HEALTH CARE EDUCATION/TRAINING PROGRAM

## 2021-08-27 PROCEDURE — 86900 BLOOD TYPING SEROLOGIC ABO: CPT | Performed by: OBSTETRICS & GYNECOLOGY

## 2021-08-27 PROCEDURE — 86901 BLOOD TYPING SEROLOGIC RH(D): CPT | Performed by: OBSTETRICS & GYNECOLOGY

## 2021-08-27 PROCEDURE — 59510 CESAREAN DELIVERY: CPT | Performed by: STUDENT IN AN ORGANIZED HEALTH CARE EDUCATION/TRAINING PROGRAM

## 2021-08-27 PROCEDURE — 0UB70ZZ EXCISION OF BILATERAL FALLOPIAN TUBES, OPEN APPROACH: ICD-10-PCS | Performed by: STUDENT IN AN ORGANIZED HEALTH CARE EDUCATION/TRAINING PROGRAM

## 2021-08-27 PROCEDURE — 88302 TISSUE EXAM BY PATHOLOGIST: CPT | Performed by: PATHOLOGY

## 2021-08-27 PROCEDURE — 82948 REAGENT STRIP/BLOOD GLUCOSE: CPT

## 2021-08-27 PROCEDURE — 85027 COMPLETE CBC AUTOMATED: CPT | Performed by: OBSTETRICS & GYNECOLOGY

## 2021-08-27 PROCEDURE — 58611 LIGATE OVIDUCT(S) ADD-ON: CPT | Performed by: STUDENT IN AN ORGANIZED HEALTH CARE EDUCATION/TRAINING PROGRAM

## 2021-08-27 PROCEDURE — 86923 COMPATIBILITY TEST ELECTRIC: CPT

## 2021-08-27 PROCEDURE — 99024 POSTOP FOLLOW-UP VISIT: CPT | Performed by: OBSTETRICS & GYNECOLOGY

## 2021-08-27 PROCEDURE — 82805 BLOOD GASES W/O2 SATURATION: CPT | Performed by: STUDENT IN AN ORGANIZED HEALTH CARE EDUCATION/TRAINING PROGRAM

## 2021-08-27 PROCEDURE — NC001 PR NO CHARGE: Performed by: STUDENT IN AN ORGANIZED HEALTH CARE EDUCATION/TRAINING PROGRAM

## 2021-08-27 PROCEDURE — 86850 RBC ANTIBODY SCREEN: CPT | Performed by: OBSTETRICS & GYNECOLOGY

## 2021-08-27 RX ORDER — OXYCODONE HYDROCHLORIDE 5 MG/1
10 TABLET ORAL EVERY 4 HOURS PRN
Status: DISCONTINUED | OUTPATIENT
Start: 2021-08-28 | End: 2021-08-29 | Stop reason: HOSPADM

## 2021-08-27 RX ORDER — METOCLOPRAMIDE HYDROCHLORIDE 5 MG/ML
5 INJECTION INTRAMUSCULAR; INTRAVENOUS EVERY 6 HOURS PRN
Status: ACTIVE | OUTPATIENT
Start: 2021-08-27 | End: 2021-08-28

## 2021-08-27 RX ORDER — NALOXONE HYDROCHLORIDE 0.4 MG/ML
0.1 INJECTION, SOLUTION INTRAMUSCULAR; INTRAVENOUS; SUBCUTANEOUS
Status: ACTIVE | OUTPATIENT
Start: 2021-08-27 | End: 2021-08-28

## 2021-08-27 RX ORDER — SODIUM CHLORIDE, SODIUM LACTATE, POTASSIUM CHLORIDE, CALCIUM CHLORIDE 600; 310; 30; 20 MG/100ML; MG/100ML; MG/100ML; MG/100ML
INJECTION, SOLUTION INTRAVENOUS CONTINUOUS PRN
Status: DISCONTINUED | OUTPATIENT
Start: 2021-08-27 | End: 2021-08-27

## 2021-08-27 RX ORDER — ONDANSETRON 2 MG/ML
INJECTION INTRAMUSCULAR; INTRAVENOUS AS NEEDED
Status: DISCONTINUED | OUTPATIENT
Start: 2021-08-27 | End: 2021-08-27

## 2021-08-27 RX ORDER — FENTANYL CITRATE/PF 50 MCG/ML
25 SYRINGE (ML) INJECTION
Status: COMPLETED | OUTPATIENT
Start: 2021-08-27 | End: 2021-08-27

## 2021-08-27 RX ORDER — ONDANSETRON 2 MG/ML
4 INJECTION INTRAMUSCULAR; INTRAVENOUS EVERY 4 HOURS PRN
Status: ACTIVE | OUTPATIENT
Start: 2021-08-27 | End: 2021-08-28

## 2021-08-27 RX ORDER — DEXAMETHASONE SODIUM PHOSPHATE 4 MG/ML
8 INJECTION, SOLUTION INTRA-ARTICULAR; INTRALESIONAL; INTRAMUSCULAR; INTRAVENOUS; SOFT TISSUE ONCE AS NEEDED
Status: ACTIVE | OUTPATIENT
Start: 2021-08-27 | End: 2021-08-28

## 2021-08-27 RX ORDER — CALCIUM CARBONATE 200(500)MG
1000 TABLET,CHEWABLE ORAL 3 TIMES DAILY PRN
Status: DISCONTINUED | OUTPATIENT
Start: 2021-08-27 | End: 2021-08-29 | Stop reason: HOSPADM

## 2021-08-27 RX ORDER — DIPHENHYDRAMINE HCL 25 MG
25 TABLET ORAL EVERY 6 HOURS PRN
Status: DISCONTINUED | OUTPATIENT
Start: 2021-08-27 | End: 2021-08-29 | Stop reason: HOSPADM

## 2021-08-27 RX ORDER — SODIUM CHLORIDE, SODIUM LACTATE, POTASSIUM CHLORIDE, CALCIUM CHLORIDE 600; 310; 30; 20 MG/100ML; MG/100ML; MG/100ML; MG/100ML
125 INJECTION, SOLUTION INTRAVENOUS CONTINUOUS
Status: DISCONTINUED | OUTPATIENT
Start: 2021-08-27 | End: 2021-08-29 | Stop reason: HOSPADM

## 2021-08-27 RX ORDER — MEPERIDINE HYDROCHLORIDE 25 MG/ML
25 INJECTION INTRAMUSCULAR; INTRAVENOUS; SUBCUTANEOUS ONCE AS NEEDED
Status: DISCONTINUED | OUTPATIENT
Start: 2021-08-27 | End: 2021-08-29 | Stop reason: HOSPADM

## 2021-08-27 RX ORDER — PROPOFOL 10 MG/ML
INJECTION, EMULSION INTRAVENOUS AS NEEDED
Status: DISCONTINUED | OUTPATIENT
Start: 2021-08-27 | End: 2021-08-27

## 2021-08-27 RX ORDER — SODIUM CHLORIDE, SODIUM LACTATE, POTASSIUM CHLORIDE, CALCIUM CHLORIDE 600; 310; 30; 20 MG/100ML; MG/100ML; MG/100ML; MG/100ML
125 INJECTION, SOLUTION INTRAVENOUS CONTINUOUS
Status: DISCONTINUED | OUTPATIENT
Start: 2021-08-27 | End: 2021-08-27

## 2021-08-27 RX ORDER — KETAMINE HCL IN NACL, ISO-OSM 100MG/10ML
SYRINGE (ML) INJECTION AS NEEDED
Status: DISCONTINUED | OUTPATIENT
Start: 2021-08-27 | End: 2021-08-27

## 2021-08-27 RX ORDER — DEXAMETHASONE SODIUM PHOSPHATE 4 MG/ML
INJECTION, SOLUTION INTRA-ARTICULAR; INTRALESIONAL; INTRAMUSCULAR; INTRAVENOUS; SOFT TISSUE AS NEEDED
Status: DISCONTINUED | OUTPATIENT
Start: 2021-08-27 | End: 2021-08-27

## 2021-08-27 RX ORDER — ONDANSETRON 2 MG/ML
4 INJECTION INTRAMUSCULAR; INTRAVENOUS EVERY 8 HOURS PRN
Status: DISCONTINUED | OUTPATIENT
Start: 2021-08-27 | End: 2021-08-29 | Stop reason: HOSPADM

## 2021-08-27 RX ORDER — ONDANSETRON 2 MG/ML
4 INJECTION INTRAMUSCULAR; INTRAVENOUS EVERY 8 HOURS PRN
Status: DISCONTINUED | OUTPATIENT
Start: 2021-08-27 | End: 2021-08-27

## 2021-08-27 RX ORDER — OXYCODONE HYDROCHLORIDE AND ACETAMINOPHEN 5; 325 MG/1; MG/1
1 TABLET ORAL EVERY 4 HOURS PRN
Status: DISPENSED | OUTPATIENT
Start: 2021-08-27 | End: 2021-08-28

## 2021-08-27 RX ORDER — CEFAZOLIN SODIUM 2 G/50ML
2000 SOLUTION INTRAVENOUS ONCE
Status: COMPLETED | OUTPATIENT
Start: 2021-08-27 | End: 2021-08-27

## 2021-08-27 RX ORDER — SIMETHICONE 80 MG
80 TABLET,CHEWABLE ORAL EVERY 6 HOURS PRN
Status: DISCONTINUED | OUTPATIENT
Start: 2021-08-27 | End: 2021-08-29 | Stop reason: HOSPADM

## 2021-08-27 RX ORDER — DOCUSATE SODIUM 100 MG/1
100 CAPSULE, LIQUID FILLED ORAL 2 TIMES DAILY
Status: DISCONTINUED | OUTPATIENT
Start: 2021-08-27 | End: 2021-08-29 | Stop reason: HOSPADM

## 2021-08-27 RX ORDER — HYDROMORPHONE HCL/PF 1 MG/ML
SYRINGE (ML) INJECTION AS NEEDED
Status: DISCONTINUED | OUTPATIENT
Start: 2021-08-27 | End: 2021-08-27

## 2021-08-27 RX ORDER — ACETAMINOPHEN 325 MG/1
650 TABLET ORAL EVERY 4 HOURS PRN
Status: DISCONTINUED | OUTPATIENT
Start: 2021-08-27 | End: 2021-08-29 | Stop reason: HOSPADM

## 2021-08-27 RX ORDER — FENTANYL CITRATE 50 UG/ML
INJECTION, SOLUTION INTRAMUSCULAR; INTRAVENOUS AS NEEDED
Status: DISCONTINUED | OUTPATIENT
Start: 2021-08-27 | End: 2021-08-27

## 2021-08-27 RX ORDER — PROPOFOL 10 MG/ML
INJECTION, EMULSION INTRAVENOUS CONTINUOUS PRN
Status: DISCONTINUED | OUTPATIENT
Start: 2021-08-27 | End: 2021-08-27

## 2021-08-27 RX ORDER — KETOROLAC TROMETHAMINE 30 MG/ML
INJECTION, SOLUTION INTRAMUSCULAR; INTRAVENOUS AS NEEDED
Status: DISCONTINUED | OUTPATIENT
Start: 2021-08-27 | End: 2021-08-27

## 2021-08-27 RX ORDER — OXYTOCIN/RINGER'S LACTATE 30/500 ML
PLASTIC BAG, INJECTION (ML) INTRAVENOUS AS NEEDED
Status: DISCONTINUED | OUTPATIENT
Start: 2021-08-27 | End: 2021-08-27

## 2021-08-27 RX ORDER — BUPIVACAINE HYDROCHLORIDE 7.5 MG/ML
INJECTION, SOLUTION INTRASPINAL AS NEEDED
Status: DISCONTINUED | OUTPATIENT
Start: 2021-08-27 | End: 2021-08-27

## 2021-08-27 RX ORDER — TRANEXAMIC ACID 100 MG/ML
INJECTION, SOLUTION INTRAVENOUS AS NEEDED
Status: DISCONTINUED | OUTPATIENT
Start: 2021-08-27 | End: 2021-08-27

## 2021-08-27 RX ORDER — LORATADINE 10 MG/1
10 TABLET ORAL DAILY
Status: DISCONTINUED | OUTPATIENT
Start: 2021-08-28 | End: 2021-08-29 | Stop reason: HOSPADM

## 2021-08-27 RX ORDER — NALBUPHINE HCL 10 MG/ML
5 AMPUL (ML) INJECTION
Status: DISPENSED | OUTPATIENT
Start: 2021-08-27 | End: 2021-08-28

## 2021-08-27 RX ORDER — MORPHINE SULFATE 0.5 MG/ML
INJECTION, SOLUTION EPIDURAL; INTRATHECAL; INTRAVENOUS AS NEEDED
Status: DISCONTINUED | OUTPATIENT
Start: 2021-08-27 | End: 2021-08-27

## 2021-08-27 RX ORDER — IBUPROFEN 600 MG/1
600 TABLET ORAL EVERY 6 HOURS PRN
Status: DISCONTINUED | OUTPATIENT
Start: 2021-08-27 | End: 2021-08-29 | Stop reason: HOSPADM

## 2021-08-27 RX ORDER — SODIUM CHLORIDE 9 MG/ML
125 INJECTION, SOLUTION INTRAVENOUS CONTINUOUS
Status: DISCONTINUED | OUTPATIENT
Start: 2021-08-27 | End: 2021-08-29 | Stop reason: HOSPADM

## 2021-08-27 RX ORDER — OXYTOCIN/RINGER'S LACTATE 30/500 ML
62.5 PLASTIC BAG, INJECTION (ML) INTRAVENOUS CONTINUOUS
Status: ACTIVE | OUTPATIENT
Start: 2021-08-27 | End: 2021-08-27

## 2021-08-27 RX ORDER — KETOROLAC TROMETHAMINE 30 MG/ML
30 INJECTION, SOLUTION INTRAMUSCULAR; INTRAVENOUS EVERY 6 HOURS PRN
Status: DISPENSED | OUTPATIENT
Start: 2021-08-27 | End: 2021-08-28

## 2021-08-27 RX ORDER — OXYCODONE HYDROCHLORIDE 5 MG/1
5 TABLET ORAL EVERY 4 HOURS PRN
Status: DISCONTINUED | OUTPATIENT
Start: 2021-08-28 | End: 2021-08-29 | Stop reason: HOSPADM

## 2021-08-27 RX ADMIN — TRANEXAMIC ACID 1 G: 1 INJECTION, SOLUTION INTRAVENOUS at 13:51

## 2021-08-27 RX ADMIN — SODIUM CHLORIDE, SODIUM LACTATE, POTASSIUM CHLORIDE, AND CALCIUM CHLORIDE 125 ML/HR: .6; .31; .03; .02 INJECTION, SOLUTION INTRAVENOUS at 16:12

## 2021-08-27 RX ADMIN — KETOROLAC TROMETHAMINE 30 MG: 30 INJECTION, SOLUTION INTRAMUSCULAR; INTRAVENOUS at 20:52

## 2021-08-27 RX ADMIN — FENTANYL CITRATE 50 MCG: 50 INJECTION, SOLUTION INTRAMUSCULAR; INTRAVENOUS at 13:42

## 2021-08-27 RX ADMIN — SODIUM CHLORIDE 125 ML/HR: 0.9 INJECTION, SOLUTION INTRAVENOUS at 12:49

## 2021-08-27 RX ADMIN — CEFAZOLIN SODIUM 2000 MG: 2 SOLUTION INTRAVENOUS at 12:58

## 2021-08-27 RX ADMIN — BUPIVACAINE HYDROCHLORIDE IN DEXTROSE 1.7 ML: 7.5 INJECTION, SOLUTION SUBARACHNOID at 13:00

## 2021-08-27 RX ADMIN — FENTANYL CITRATE 25 MCG: 50 INJECTION INTRAMUSCULAR; INTRAVENOUS at 15:28

## 2021-08-27 RX ADMIN — KETOROLAC TROMETHAMINE 30 MG: 30 INJECTION, SOLUTION INTRAMUSCULAR at 14:45

## 2021-08-27 RX ADMIN — FENTANYL CITRATE 25 MCG: 50 INJECTION INTRAMUSCULAR; INTRAVENOUS at 17:06

## 2021-08-27 RX ADMIN — MORPHINE SULFATE 2.35 MG: 0.5 INJECTION, SOLUTION EPIDURAL; INTRATHECAL; INTRAVENOUS at 13:57

## 2021-08-27 RX ADMIN — FENTANYL CITRATE 50 MCG: 50 INJECTION, SOLUTION INTRAMUSCULAR; INTRAVENOUS at 14:44

## 2021-08-27 RX ADMIN — DEXAMETHASONE SODIUM PHOSPHATE 4 MG: 4 INJECTION INTRA-ARTICULAR; INTRALESIONAL; INTRAMUSCULAR; INTRAVENOUS; SOFT TISSUE at 13:04

## 2021-08-27 RX ADMIN — NALBUPHINE HYDROCHLORIDE 5 MG: 10 INJECTION, SOLUTION INTRAMUSCULAR; INTRAVENOUS; SUBCUTANEOUS at 16:09

## 2021-08-27 RX ADMIN — SODIUM CHLORIDE, SODIUM LACTATE, POTASSIUM CHLORIDE, AND CALCIUM CHLORIDE: .6; .31; .03; .02 INJECTION, SOLUTION INTRAVENOUS at 14:36

## 2021-08-27 RX ADMIN — Medication 10 MG: at 14:32

## 2021-08-27 RX ADMIN — FENTANYL CITRATE 25 MCG: 50 INJECTION INTRAMUSCULAR; INTRAVENOUS at 15:46

## 2021-08-27 RX ADMIN — DOCUSATE SODIUM 100 MG: 100 CAPSULE ORAL at 20:52

## 2021-08-27 RX ADMIN — HYDROMORPHONE HYDROCHLORIDE 0.5 MG: 1 INJECTION, SOLUTION INTRAMUSCULAR; INTRAVENOUS; SUBCUTANEOUS at 14:59

## 2021-08-27 RX ADMIN — ACETAMINOPHEN 650 MG: 325 TABLET ORAL at 22:10

## 2021-08-27 RX ADMIN — HYDROMORPHONE HYDROCHLORIDE 0.5 MG: 1 INJECTION, SOLUTION INTRAMUSCULAR; INTRAVENOUS; SUBCUTANEOUS at 15:06

## 2021-08-27 RX ADMIN — DIPHENHYDRAMINE HCL 25 MG: 25 TABLET, COATED ORAL at 23:34

## 2021-08-27 RX ADMIN — MORPHINE SULFATE 2.5 MG: 0.5 INJECTION, SOLUTION EPIDURAL; INTRATHECAL; INTRAVENOUS at 13:45

## 2021-08-27 RX ADMIN — FENTANYL CITRATE 25 MCG: 50 INJECTION INTRAMUSCULAR; INTRAVENOUS at 17:10

## 2021-08-27 RX ADMIN — Medication 250 UNITS: at 13:41

## 2021-08-27 RX ADMIN — ONDANSETRON 4 MG: 2 INJECTION INTRAMUSCULAR; INTRAVENOUS at 13:04

## 2021-08-27 RX ADMIN — SODIUM CHLORIDE, SODIUM LACTATE, POTASSIUM CHLORIDE, AND CALCIUM CHLORIDE: .6; .31; .03; .02 INJECTION, SOLUTION INTRAVENOUS at 12:28

## 2021-08-27 RX ADMIN — MORPHINE SULFATE 0.15 MG: 0.5 INJECTION, SOLUTION EPIDURAL; INTRATHECAL; INTRAVENOUS at 13:00

## 2021-08-27 RX ADMIN — FENTANYL CITRATE 50 MCG: 50 INJECTION, SOLUTION INTRAMUSCULAR; INTRAVENOUS at 13:45

## 2021-08-27 RX ADMIN — PROPOFOL 80 MCG/KG/MIN: 10 INJECTION, EMULSION INTRAVENOUS at 14:12

## 2021-08-27 RX ADMIN — Medication 10 MG: at 14:21

## 2021-08-27 RX ADMIN — CALCIUM CARBONATE (ANTACID) CHEW TAB 500 MG 1000 MG: 500 CHEW TAB at 23:34

## 2021-08-27 RX ADMIN — Medication 62.5 MILLI-UNITS/MIN: at 15:43

## 2021-08-27 RX ADMIN — PHENYLEPHRINE HYDROCHLORIDE 50 MCG/MIN: 10 INJECTION INTRAVENOUS at 13:02

## 2021-08-27 RX ADMIN — PROPOFOL 20 MG: 10 INJECTION, EMULSION INTRAVENOUS at 14:12

## 2021-08-27 RX ADMIN — FENTANYL CITRATE 50 MCG: 50 INJECTION, SOLUTION INTRAMUSCULAR; INTRAVENOUS at 14:12

## 2021-08-27 RX ADMIN — PROPOFOL 10 MG: 10 INJECTION, EMULSION INTRAVENOUS at 14:32

## 2021-08-27 NOTE — PLAN OF CARE
Problem: Potential for Falls  Goal: Patient will remain free of falls  Description: INTERVENTIONS:  - Educate patient/family on patient safety including physical limitations  - Instruct patient to call for assistance with activity   - Consult OT/PT to assist with strengthening/mobility   - Keep Call bell within reach  - Keep bed low and locked with side rails adjusted as appropriate  - Keep care items and personal belongings within reach  - Initiate and maintain comfort rounds  - Make Fall Risk Sign visible to staff  - Apply yellow socks and bracelet for high fall risk patients  - Consider moving patient to room near nurses station  Outcome: Progressing     Problem: PAIN - ADULT  Goal: Verbalizes/displays adequate comfort level or baseline comfort level  Description: Interventions:  - Encourage patient to monitor pain and request assistance  - Assess pain using appropriate pain scale  - Administer analgesics based on type and severity of pain and evaluate response  - Implement non-pharmacological measures as appropriate and evaluate response  - Consider cultural and social influences on pain and pain management  - Notify physician/advanced practitioner if interventions unsuccessful or patient reports new pain  Outcome: Progressing     Problem: INFECTION - ADULT  Goal: Absence or prevention of progression during hospitalization  Description: INTERVENTIONS:  - Assess and monitor for signs and symptoms of infection  - Monitor lab/diagnostic results  - Monitor all insertion sites, i e  indwelling lines, tubes, and drains  - Monitor endotracheal if appropriate and nasal secretions for changes in amount and color  - Leadore appropriate cooling/warming therapies per order  - Administer medications as ordered  - Instruct and encourage patient and family to use good hand hygiene technique  - Identify and instruct in appropriate isolation precautions for identified infection/condition  Outcome: Progressing  Goal: Absence of fever/infection during neutropenic period  Description: INTERVENTIONS:  - Monitor WBC    Outcome: Progressing     Problem: SAFETY ADULT  Goal: Patient will remain free of falls  Description: INTERVENTIONS:  - Educate patient/family on patient safety including physical limitations  - Instruct patient to call for assistance with activity   - Consult OT/PT to assist with strengthening/mobility   - Keep Call bell within reach  - Keep bed low and locked with side rails adjusted as appropriate  - Keep care items and personal belongings within reach  - Initiate and maintain comfort rounds  - Make Fall Risk Sign visible to staff  - Apply yellow socks and bracelet for high fall risk patients  - Consider moving patient to room near nurses station  Outcome: Progressing  Goal: Maintain or return to baseline ADL function  Description: INTERVENTIONS:  -  Assess patient's ability to carry out ADLs; assess patient's baseline for ADL function and identify physical deficits which impact ability to perform ADLs (bathing, care of mouth/teeth, toileting, grooming, dressing, etc )  - Assess/evaluate cause of self-care deficits   - Assess range of motion  - Assess patient's mobility; develop plan if impaired  - Assess patient's need for assistive devices and provide as appropriate  - Encourage maximum independence but intervene and supervise when necessary  - Involve family in performance of ADLs  - Assess for home care needs following discharge   - Consider OT consult to assist with ADL evaluation and planning for discharge  - Provide patient education as appropriate  Outcome: Progressing  Goal: Maintains/Returns to pre admission functional level  Description: INTERVENTIONS:  - Perform BMAT or MOVE assessment daily    - Set and communicate daily mobility goal to care team and patient/family/caregiver     - Collaborate with rehabilitation services on mobility goals if consulted  - Out of bed for toileting  - Record patient progress and toleration of activity level   Outcome: Progressing     Problem: Knowledge Deficit  Goal: Patient/family/caregiver demonstrates understanding of disease process, treatment plan, medications, and discharge instructions  Description: Complete learning assessment and assess knowledge base    Interventions:  - Provide teaching at level of understanding  - Provide teaching via preferred learning methods  Outcome: Progressing     Problem: DISCHARGE PLANNING  Goal: Discharge to home or other facility with appropriate resources  Description: INTERVENTIONS:  - Identify barriers to discharge w/patient and caregiver  - Arrange for needed discharge resources and transportation as appropriate  - Identify discharge learning needs (meds, wound care, etc )  - Arrange for interpretive services to assist at discharge as needed  - Refer to Case Management Department for coordinating discharge planning if the patient needs post-hospital services based on physician/advanced practitioner order or complex needs related to functional status, cognitive ability, or social support system  Outcome: Progressing     Problem: POSTPARTUM  Goal: Experiences normal postpartum course  Description: INTERVENTIONS:  - Monitor maternal vital signs  - Assess uterine involution and lochia  Outcome: Progressing  Goal: Appropriate maternal -  bonding  Description: INTERVENTIONS:  - Identify family support  - Assess for appropriate maternal/infant bonding   -Encourage maternal/infant bonding opportunities  - Referral to  or  as needed  Outcome: Progressing  Goal: Establishment of infant feeding pattern  Description: INTERVENTIONS:  - Assess breast/bottle feeding  - Refer to lactation as needed  Outcome: Progressing  Goal: Incision(s), wounds(s) or drain site(s) healing without S/S of infection  Description: INTERVENTIONS  - Assess and document dressing, incision, wound bed, drain sites and surrounding tissue  - Provide patient and family education  Outcome: Progressing     Problem: ALTERATION IN THE BREAST  Goal: Optimize infant feeding at the breast  Description: INTERVENTIONS:  - Latch, breast and nipple assessment  - Assess prior breast feeding history  - Hand expression of breast milk  - For cracked, bleeding and or sore nipples reassess latch, treat damaged nipple  -Educate mother on feeding cues  -Positioning/latch techniques  Outcome: Progressing     Problem: INADEQUATE LATCH, SUCK OR SWALLOW  Goal: Demonstrate ability to latch and sustain latch, audible swallowing and satiety  Description: INTERVENTIONS:  - Assess oral anatomy, notify Physician/AP for abnormal findings  - Establish milk expression  - Maximize feeding opportunity (skin to skin, behavioral state)  - Position/latch techniques  - Discourage use of pacifier-artificial nipple  - Mechanical pumping  - Nipple Shield  - Supplemental formula feeding (Physician/AP order)  - Alternative feeding method  Outcome: Progressing

## 2021-08-27 NOTE — H&P
[facility] 39 [facility]  [facility] MRN: [id number]  Unit/Bed#: LD PACU-03 Encounter: [id number]      Assessment: 39 y o  [id number] at 36w0d admitted for RLTCS and bilateral salpignectomy  FHT: 145, reactive    Plan:   · Admit  · CBC, RPR, Type & Screen  · Type and cross for 2 units  · Spinal for analgesia  · RLTCS  · 2g ancef for surgical ppx    Dr Johanna Garvey aware      SUBJECTIVE:    Chief Complaint: here for my c section and tubal     HPI: Santina Sacks is a 39 y o  [id number] with an EDUARD of 2021, by Ultrasound at 36w0d who is being admitted for RLTCS and bilateral salpingectomy  She denies having uterine contractions, has no LOF, and reports no VB  She states she has felt good FM  [facility]     Pregnancy complications: G0NCV, hx uterine rupture, hx 3 prior cesareans, antiphospholipid syndrome, bicornuate uterus, desire for sterilization, multigravida, AMA, obesity    Baby complications/comments: polyhydramnios    Patient Active Problem List   Diagnosis    Fibromyalgia    Bicornuate uterus affecting pregnancy in third trimester, antepartum    H/O rupture of uterus    History of  section    Request for sterilization    Antiphospholipid syndrome during pregnancy ([facility] [address] )    Pregnancy, obstetrical care, third trimester    Diet controlled gestational diabetes mellitus (GDM) in third trimester    Hx of intrauterine growth restriction in prior pregnancy, currently pregnant, third trimester    Elderly multigravida, third trimester    Maternal morbid obesity in third trimester, antepartum ([facility] [address] )    Polyhydramnios affecting pregnancy in third trimester    Prenatal care, subsequent pregnancy, third trimester    Status post repeat low transverse  section    Sjogren's disease ([facility] [address] )    Anxiety       OB History    Para Term  AB Living   10 4 3 1 5 4   SAB TAB Ectopic Multiple Live Births   5 0 0 0 4      # Outcome Date GA Lbr Mac/2nd Weight Sex Delivery Anes PTL Lv   10 Current 9  16 36w3d  3180 g (7 lb 0 2 oz) M CS-LTranv Spinal N JOHN      Birth Comments: Prenatal:Received prenatal care: yesMaternal Blood Type O positive   Antibody negativeMaternal hepatitis B surface antigen status: negativeMaternal HIV status: unknownMaternal RPR not doneMaternal Rubella not doneMaternal Group B strep: negativeMaternal STDs: chlamydia (neg) and gonorrhea (neg)Hepatitis C Antibody negativeMaternal drug screen: not done   8 Term 13 39w0d  3402 g (7 lb 8 oz) M CS-LTranv Spinal N JOHN      Birth Comments: Uterine Rupture      Complications: Uterine rupture, History of precipitous labor and delivery   7 2012 8w0d          6 Term 11 39w0d  2693 g (5 lb 15 oz) M CS-LTranv Gen N JOHN      Birth Comments: Fetal intolerance/decels/General Anes      Complications: Fetal Intolerance, IUGR (intrauterine growth restriction) affecting care of mother   5 SAB 2010 6w0d          4 Term 01/14/10 39w0d  2977 g (6 lb 9 oz) M Vag-Spont EPI N JOHN      Birth Comments: Describes delivery as "Truamatic"   3 2009 7w0d    SAB      2 2009 10w0d    SAB      1 SAB 03 15w0d              Past Medical History:   Diagnosis Date    Anemia     Antiphospholipid antibody syndrome ([facility] [address] ) not on AC     Antiphospholipid syndrome during pregnancy ([facility] [address] )     Asthma     Endometriosis     Fibromyalgia     Fibromyalgia, primary     Polycystic ovary syndrome     with insulin resistence    Sjogren's disease ([facility] [address] )        Past Surgical History:   Procedure Laterality Date    APPENDECTOMY           SECTION      C/S x 3    LAPAROSCOPY AND HYSTEROSCOPY      Reproductive Medicine/Recurrent losses    TX LAP,APPENDECTOMY N/A 2019    Procedure: APPENDECTOMY LAPAROSCOPIC;  Surgeon: Randy Cali MD;  Location: [facility];  Service: General       Social History     Tobacco Use    Smoking status: Former Smoker     Packs/day: 0 50     Types: Cigarettes     Quit date: 2021 Years since quittin 6    Smokeless tobacco: Never Used   Substance Use Topics    Alcohol use: Not Currently     Comment: occasional       Allergies   Allergen Reactions    Valacyclovir Itching and Swelling     Treated for shingles and 24years of age       Medications Prior to Admission   Medication    acetaminophen (TYLENOL) 325 mg tablet    Blood Glucose Monitoring Suppl (Contour Next EZ) w/Device KIT    Contour Next Test test strip    Doxylamine-Pyridoxine 10-10 MG TBEC    enoxaparin (LOVENOX) 40 mg/0 4 mL    Famotidine (PEPCID PO)    loratadine (CLARITIN) 10 mg tablet    metoclopramide (REGLAN) 5 mg tablet    Microlet Lancets MISC    ondansetron (ZOFRAN-ODT) 4 mg disintegrating tablet    Prenatal MV-Min-Fe Fum-FA-DHA (Prenatal Multivitamin Plus DHA) 27-0 8-250 MG CAPS    calcium gluconate 500 mg tablet           OBJECTIVE:  Vitals:  Temp:  [98 °F (36 7 °C)] 98 °F (36 7 °C)  HR:  [74] 74  Resp:  [18] 18  BP: (115)/(72) 115/72  Body mass index is 43 08 kg/m²  Physical Exam:  Physical Exam  Constitutional:       Appearance: She is well-developed  HENT:      Head: Normocephalic and atraumatic  Eyes:      Extraocular Movements: Extraocular movements intact  Conjunctiva/sclera: Conjunctivae normal    Cardiovascular:      Rate and Rhythm: Normal rate and regular rhythm  Heart sounds: Normal heart sounds  Abdominal:      Comments: Gravid   Neurological:      Mental Status: She is alert and oriented to person, place, and time  Skin:     General: Skin is warm     Psychiatric:         Mood and Affect: Mood normal          Behavior: Behavior normal             Lab Results   Component Value Date    WBC 8 47 2021    HGB 11 9 2021    HCT 35 7 2021     2021     Lab Results   Component Value Date    K 3 8 04/15/2021     04/15/2021    CO2 25 04/15/2021    BUN 7 04/15/2021    CREATININE 0 58 (L) 04/15/2021    AST 23 04/15/2021    ALT 73 04/15/2021 Prenatal Labs   Blood type: O+  Antibody: negative  Group B strep: pending  HIV: negative  Hepatitis B: negative  RPR: non-reactive  Rubella: Immune  Varicella: Unknown  1 hour Glucose: 170  3 hour glucose: 59    >2 Midnights  INPATIENT       Anita Calvo MD  PGY-2 OB/GYN   8/27/2021 11:50 AM

## 2021-08-27 NOTE — DISCHARGE SUMMARY
CS Discharge Summary - Pamela Cervantes 39 y o  female MRN: [id number]    Unit/Bed#: LD PACU-03 Encounter: [id number]    Admission Date: 2021     Discharge Date: 21      Admitting Diagnosis:   36 weeks gestation of pregnancy  Bicornuate uterus  History of uterine rupture  History of  section  Request for sterilization  Antiphospholipid syndrome  A1GDM  History of FGR  AMA  Obesity  Polyhydramnios  Sjogren's disease  Anxiety  Fibromyalgia    Discharge Diagnosis:   Delivery  S/p RLVCS  S/p Bilateral fimbriectomy and partial salpingectomy    Procedures:   repeat  section, low vertical incision  Bilateral fimbriectomy    Admitting Attending: Dr Pattie Hernandez  Delivery Attending: Dr Pattie Hernandez  Discharge Attending: Dr Deisy Worrell service: none  Consult attending: none    Hospital Course:     Pamela Cervantes is a 39 y o  [id number] who was admitted at 36w0d for scheduled  section and tubal sterilization  She then underwent a repeat  section complicated by extensive adhesions  She delivered a viable male  on 21 at [phone]  APGARS were 8, 9 at 1 and 5 minutes, respectively   weighed 6lb 8oz   was then transferred to  nursery  Patient was transferred to recovery in stable condition  The patient's post partum course was unremarkable    Preoperative hemaglobin was 11 9, postoperative was 9 9  Her postoperative pain was well controlled with oral analgesics  On day of discharge, she was ambulating and able to reasonably perform all ADLs  She was voiding and had appropriate bowel function  Pain was well controlled  She was discharged home on post-operative day #2 without complications  Patient was instructed to follow up with her OB as an outpatient and was given appropriate warnings to call provider if she develops signs of infection or uncontrolled pain  She is breast and bottlefeeding   Mom's blood type is O positive       Complications: None    Condition at discharge:   good     Provisions for Follow-Up Care:  See after visit summary for information related to follow-up care and any pertinent home health orders  Disposition:   Home    Planned Readmission:   No    Discharge Medications:   Prenatal vitamin daily for 6 months or the duration of nursing whichever is longer  Motrin 600 mg orally every 6 hours as needed for pain  Tylenol (over the counter) per bottle directions as needed for pain  Hydrocortisone cream 1% (over the counter) applied 1-2x daily to hemorrhoids as needed  Witch hazel pads for hemorrhoidal discomfort as needed  Roxicodone PRN    Discharge instructions :   -Do not place anything (no partner, tampons or douche) in your vagina for 6 weeks  -You may walk for exercise for the first 6 weeks then gradually return to your usual activities    -Please do not drive for 1 week if you have no stitches and for 2 weeks if you have stitches or underwent a  delivery     -You may take baths or shower per your preference    -Please look at your bust (breasts) in the mirror daily and call provider for redness or tenderness or increased warmth  - If you have had a  please look at your incision daily as well and call provider for increasing redness or steady drainage from the incision    -Please call your provider if temperature > 100 4*F or 38* C, worsening pain or a foul discharge      Rivka Crisostomo MD  21  10:51 AM

## 2021-08-27 NOTE — OP NOTE
Section Procedure Note    Indications:   History of C/S x3  History of uterine rupture  Failed TOLAC  Undesired fertility desiring permanent sterilization    Pre-operative Diagnosis:   36 weeks gestation of pregnancy  Bicornuate uterus  History of uterine rupture  History of  section  Request for sterilization  Antiphospholipid syndrome  A1GDM  History of FGR  AMA  Obesity  Polyhydramnios  Sjogren's disease  Anxiety  Fibromyalgia    Post-operative Diagnosis:   S/p RLVCS  Fimbriectomy and partial salpingectomy  Bicornuate uterus  History of uterine rupture  History of  section  Antiphospholipid syndrome  A1GDM  History of FGR  AMA  Obesity  Polyhydramnios  Sjogren's disease  Anxiety  Fibromyalgia    Attending: Solomon Mireles MD & Marleen Garcia MD  Resident: Lamonte Corea MD & Soco Figueredo MD    Maternal Findings:  Bicornuate uterus - Baby in left uterine horn; Placenta in right uterine horn  Normal tubes and ovaries bilaterally  Dense adhesions of the rectus muscles to the anterior abdominal wall and fascia  Time >30 minutes from start to delivery of the  due to adhesions  No adhesions of the fallopian tubes, ovaries, fundal (indicating uterus was relatively easy to exteriorize)     Findings:  Viable male weighing 6lbs 8oz;  Apgar scores of 8 at one minute and 9 at five minutes     Clear amniotic fluid  Normal placenta with 3-vessel cord    Arterial and Venous Gases:  Umbilical Cord Venous Blood Gas:  Results from last 7 days   Lab Units 21  1343   PH COV  7 361   PCO2 COV mm HG 41 3   HCO3 COV mmol/L 22 9   BASE EXC COV mmol/L -2 4*   O2 CT CD VB mL/dL 10 8   O2 HGB, VENOUS CORD % 74 8     Umbilical Cord Arterial Blood Gas:  Results from last 7 days   Lab Units 21  1343   PH COA  7 312   PCO2 COA  46 8   PO2 COA mm HG 13 4   HCO3 COA mmol/L 23 1   BASE EXC COA mmol/L -3 3*   O2 CONTENT CORD ART ml/dl 5 3   O2 HGB, ARTERIAL CORD % 27 5       Specimens: Arterial and venous cord gases, cord blood, segment of umbilical cord, placenta to storage; Bilateral fimbriae and part of fallopian tube     Quantitative Blood Loss: 658 mL    Drains: Green catheter           Complications:  None           Disposition: PACU            Condition: stable    Brief Labor Course:   Patient was admitted for scheduled repeat  section and tubal sterilization    Procedure Details   The patient was seen prior to the procedure  Risks, benefits, possible complications, alternate treatment options, and expected outcomes were discussed with the patient  The patient agreed with the proposed plan and gave informed consent for a  section and tubal sterilization  The patient was taken to the [facility] Operating Room where she received spinal (with additional IV analgesia) anesthesia  For infection prophylaxis, she received Ancef 2g preoperatively  Fetal heart tones in the OR were assessed and noted to be within normal limits and a Green catheter and SCDs were placed  The abdomen was prepped with Chloraprep, the vagina was prepped with Betadine, and following appropriate drying time, the patient was draped in the usual sterile manner  A Time Out was held and the above information confirmed  The patient was identified as Montserrat Peterson and the procedure verified as a  Delivery and tubal sterilization  A Pfannenstiel incision was made and carried down through the underlying subcutaneous tissue to the fascia using a scalpel  The rectus fascia was then nicked in the midline and dissected laterally using Ramirez scissors  The superior edge of the  fascial incision was grasped with Kocher clamps bilaterally, tented upward and the underlying rectus muscles were dissected off sharply with Ramirez scissors and scapel  This was repeated on the inferior edge of the fascia and dissected down to the pubic rami    The rectus muscles were  and the peritoneum was identified and entered  The abdominal cavity was checked digitally to confirm safe entry  Adhesions were noted inferiorly from the peritoneum and bladder serosa to the lower uterine segment  The rectus muscles and peritoneum were unable to be extended and further dissection of the rectus muscles from the fascia anteriorly was performed with Ramirez scissors  The right rectus muscle was incised to make space for provider fingers for blunt extension of the peritoneum  The uterus was identified and the adhesions were inspected  The bladder blade was unable to be inserted secondary to adhesions inferiorly  The decision was made to forego a low transverse hysterotomy due to concern for potential bladder injury with dissection of the uterine adhesions  Therefore, the decision was made to proceed with a low vertical uterine incision  A vertical uterine incision was made with the scalpel and extended superiorly (as we were unable to extend inferiorly due to adhesions) with the bandage scissors  The amnion was entered with an Allis  The fetal head was palpated, elevated, and delivered through the uterine incision followed by the body without difficulty  There was noted to be spontaneous cry and good tone  There was no apparent injury to the   The umbilical cord was doubly clamped and cut after 30 seconds to allow for delayed cord clamping  The infant was handed off to the  providers  Arterial and venous cord gases, cord blood, and a segment of umbilical cord were obtained for evaluation  The placenta delivered spontaneously with uterine fundal massage and appeared normal  The uterus was exteriorized and cleaned out with a moist lap sponge  The first layer of the uterine incision was closed with a running locked suture of 0 Vicryl  Two additional layers of 0 Monocryl was used to imbricate the first  Several figure of eight sutures of 0 Monocryl were used to achieve hemostasis   A baseball stitch of 0 Monocryl was used to finish closing the uterus  Hemostasis was noted to be excellent  The uterus was returned to the abdomen     Secondary to patient discomfort and concern for disrupting surgical site, decision made to return the uterus to the abdomen and perform tubal sterilization in situ  At this point, our attention was turned to the adnexa  First the left Fallopian tube was identified and grasped with a Belinda Homans in an avascular area and tented up  A bovie was used to create an avascular window and a Sandrita clamp was placed from the fimbriea to the avascular window  The fimbrae was removed with the use of cautery  The pedicle was secured with 2-0 Vicryl  Next, part of the fallopian tube was grasped and the sandrita clamp was placed from the detached edge to the furthest extent of the clamp  The Bovie was used to remove the tube  The pedicle was secured with 2-0 Vicryl twice  Good hemostasis was noted from this tube  The right Fallopian tube was identified and removed in the same manner  Good hemostasis was noted on this side  Portions of bilateral tube were sent to pathology for routine evaluation  The hysterotomy was inspected and hemostasis was confirmed  Surgical powder was placed over the incision followed by Surgical Nu-knit  The omentum was replaced over the uterus  The fascia was closed with a running suture of 0 PDS from apices to midline  Subcutaneous adipose tissue was closed with a running suture of 2-0 Vicryl  The skin was closed with a subcuticular running suture of 4-0 Monocryl  Benzoin and steri-strip dressings were applied  Uterus noted to firm at umbilicus at end of procedure, fundal pressure applied with minimal lochia noted  Lap sponge, needle, and instrument counts were correct x2  The patient was transferred to her postpartum recovery room in stable condition and her infant went to the  nursery  Attending Attestation: Dr Veronica Ewign MD was present for the entire procedure      Rosalba Matthews C   Nigel Torres MD  OB/GYN  8/27/2021  3:26 PM

## 2021-08-27 NOTE — ANESTHESIA PROCEDURE NOTES
Spinal Block    Patient location during procedure: OB  Start time: 8/27/2021 12:54 PM  Reason for block: primary anesthetic  Staffing  Performed: Anesthesiologist and CRNA   Anesthesiologist: Tanisha Gunderson MD  Resident/CRNA: Valerie Chang CRNA  Preanesthetic Checklist  Completed: patient identified, IV checked, risks and benefits discussed, surgical consent, monitors and equipment checked, pre-op evaluation and timeout performed  Spinal Block  Patient position: sitting  Prep: ChloraPrep and site prepped and draped  Patient monitoring: frequent blood pressure checks, continuous pulse ox and heart rate  Approach: midline  Location: L3-4  Injection technique: single-shot  Needle  Needle type: pencil-tip   Needle gauge: 25 G  Assessment  Injection Assessment:  negative aspiration for heme, no paresthesia on injection and positive aspiration for clear CSF    Post-procedure:  site cleaned

## 2021-08-27 NOTE — UTILIZATION REVIEW
Inpatient Admission Authorization Request   Notification of Maternity/Delivery &  Birth Information for Admission   SERVICING FACILITY:   [address]  Tax ID: [id number]  NPI: [id number]  [facility]  [address]  Place of Service Code: 24     ATTENDING PROVIDER:  Attending Name and NPI#: Anny Mcdaniels Md [[id number]]  Address: [address]  Phone: [phone]     UTILIZATION REVIEW CONTACT:  Rea Garvey Utilization   Network Utilization Review Department  Phone: [phone]  Fax [phone]  Email: [email or web address]     PHYSICIAN ADVISORY SERVICES:  FOR [id number] REVIEW - MEDICAL NECESSITY DENIAL  Phone: [phone]  Fax: [phone]  Email: [email or web address]     TYPE OF REQUEST:  Inpatient Status     ADMISSION INFORMATION:  ADMISSION DATE/TIME: 21 10:28 AM  PATIENT DIAGNOSIS CODE/DESCRIPTION:  Uterine rupture [S37 69XA]  Previous  section complicating pregnancy [G95 096]  Encounter for  delivery without indication [O82] The primary encounter diagnosis was Pregnancy, obstetrical care, third trimester  Diagnoses of Uterine rupture and Previous  section complicating pregnancy were also pertinent to this visit  1  Pregnancy, obstetrical care, third trimester    2  Uterine rupture    3  Previous  section complicating pregnancy      DISCHARGE DATE/TIME: No discharge date for patient encounter    DISCHARGE DISPOSITION (IF DISCHARGED): Home/Self Care     MOTHER AND  INFORMATION:  Mother: Linda Quintero 1985   Delivering clinician:    OB History        10    Para   4    Term   3       1    AB   5    Living   4       SAB   5    TAB   0    Ectopic   0    Multiple   0    Live Births   4               [place] Name & MRN:   Information for the patient's :  Tanesha Long [facility] Boy Rei Love) [[id number]]      Delivery Information:  Sex: male  Delivered 2021 1:40 PM by ; Gestational Age: 44w0d    [place] Measurements:  Weight: 6 lb 8 oz (2948 g); Height: 19"    APGAR 1 minute 5 minutes 10 minutes   Totals: 8 9      [place] Birth Information: 39 y o  female MRN: [id number] Unit/Bed#: LD PACU-01 Estimated Date of Delivery: 21  Birthweight: No birth weight on file  Gestational Age: <None> Delivery Type:           APGARS  One minute Five minutes Ten minutes   Totals:               IMPORTANT INFORMATION:  Please contact the [facility] directly with any questions or concerns regarding this request  Department voicemails are confidential     Send requests for admission clinical reviews, concurrent reviews, approvals, and administrative denials due to lack of clinical to fax [phone]

## 2021-08-27 NOTE — ANESTHESIA PREPROCEDURE EVALUATION
Procedure:   SECTION () REPEAT (N/A Uterus)    Relevant Problems   ANESTHESIA (within normal limits)      CARDIO (within normal limits)      GYN   (+) Elderly multigravida, third trimester      MUSCULOSKELETAL   (+) Fibromyalgia      NEURO/PSYCH   (+) Anxiety   (+) Fibromyalgia   (+) H/O rupture of uterus      PULMONARY (within normal limits)      Other   (+) Antiphospholipid syndrome during pregnancy ([address] )   (+) Bicornuate uterus affecting pregnancy in third trimester, antepartum   (+) Diet controlled gestational diabetes mellitus (GDM) in third trimester   (+) History of  section   (+) Maternal morbid obesity in third trimester, antepartum ([address] )   (+) Request for sterilization   (+) Sjogren's disease ([address] )   (+) Status post repeat low transverse  section        Physical Exam    Airway    Mallampati score: II  TM Distance: >3 FB  Neck ROM: full     Dental   No notable dental hx     Cardiovascular      Pulmonary      Other Findings        Anesthesia Plan  ASA Score- 3     Anesthesia Type- spinal with ASA Monitors  Additional Monitors:   Airway Plan:     Comment: With Duramorph  Last Lovenox Tuesday  OK for spinal        Plan Factors-Exercise tolerance (METS): >4 METS  Chart reviewed  EKG reviewed  Existing labs reviewed  Patient summary reviewed  Induction-     Postoperative Plan-     Informed Consent- Anesthetic plan and risks discussed with patient  I personally reviewed this patient with the CRNA  Discussed and agreed on the Anesthesia Plan with the CRNA  Sherre Soulier

## 2021-08-27 NOTE — ANESTHESIA POSTPROCEDURE EVALUATION
Post-Op Assessment Note    CV Status:  Stable    Pain management: adequate     Mental Status:  Alert and awake   Hydration Status:  Euvolemic   PONV Controlled:  Controlled   Airway Patency:  Patent      Post Op Vitals Reviewed: Yes      Staff: CRNA         No complications documented      BP   108/56   Temp 96 4   Pulse 64   Resp 16   SpO2 98

## 2021-08-28 LAB
BASOPHILS # BLD AUTO: 0.02 THOUSANDS/ΜL (ref 0–0.1)
BASOPHILS NFR BLD AUTO: 0 % (ref 0–1)
EOSINOPHIL # BLD AUTO: 0.24 THOUSAND/ΜL (ref 0–0.61)
EOSINOPHIL NFR BLD AUTO: 2 % (ref 0–6)
ERYTHROCYTE [DISTWIDTH] IN BLOOD BY AUTOMATED COUNT: 14.6 % (ref 11.6–15.1)
HCT VFR BLD AUTO: 30 % (ref 34.8–46.1)
HGB BLD-MCNC: 9.9 G/DL (ref 11.5–15.4)
IMM GRANULOCYTES # BLD AUTO: 0.08 THOUSAND/UL (ref 0–0.2)
IMM GRANULOCYTES NFR BLD AUTO: 1 % (ref 0–2)
LYMPHOCYTES # BLD AUTO: 2.04 THOUSANDS/ΜL (ref 0.6–4.47)
LYMPHOCYTES NFR BLD AUTO: 19 % (ref 14–44)
MCH RBC QN AUTO: 31.3 PG (ref 26.8–34.3)
MCHC RBC AUTO-ENTMCNC: 33 G/DL (ref 31.4–37.4)
MCV RBC AUTO: 95 FL (ref 82–98)
MONOCYTES # BLD AUTO: 0.82 THOUSAND/ΜL (ref 0.17–1.22)
MONOCYTES NFR BLD AUTO: 8 % (ref 4–12)
NEUTROPHILS # BLD AUTO: 7.69 THOUSANDS/ΜL (ref 1.85–7.62)
NEUTS SEG NFR BLD AUTO: 70 % (ref 43–75)
NRBC BLD AUTO-RTO: 0 /100 WBCS
PLATELET # BLD AUTO: 206 THOUSANDS/UL (ref 149–390)
PMV BLD AUTO: 9.6 FL (ref 8.9–12.7)
RBC # BLD AUTO: 3.16 MILLION/UL (ref 3.81–5.12)
WBC # BLD AUTO: 10.89 THOUSAND/UL (ref 4.31–10.16)

## 2021-08-28 PROCEDURE — C9113 INJ PANTOPRAZOLE SODIUM, VIA: HCPCS | Performed by: OBSTETRICS & GYNECOLOGY

## 2021-08-28 PROCEDURE — 99024 POSTOP FOLLOW-UP VISIT: CPT | Performed by: STUDENT IN AN ORGANIZED HEALTH CARE EDUCATION/TRAINING PROGRAM

## 2021-08-28 PROCEDURE — 85025 COMPLETE CBC W/AUTO DIFF WBC: CPT | Performed by: OBSTETRICS & GYNECOLOGY

## 2021-08-28 RX ORDER — PANTOPRAZOLE SODIUM 40 MG/1
40 INJECTION, POWDER, FOR SOLUTION INTRAVENOUS ONCE
Status: COMPLETED | OUTPATIENT
Start: 2021-08-28 | End: 2021-08-28

## 2021-08-28 RX ORDER — LORATADINE AND PSEUDOEPHEDRINE 10; 240 MG/1; MG/1
1 TABLET, EXTENDED RELEASE ORAL DAILY
Status: DISCONTINUED | OUTPATIENT
Start: 2021-08-28 | End: 2021-08-28

## 2021-08-28 RX ADMIN — SODIUM CHLORIDE, SODIUM LACTATE, POTASSIUM CHLORIDE, AND CALCIUM CHLORIDE 125 ML/HR: .6; .31; .03; .02 INJECTION, SOLUTION INTRAVENOUS at 01:22

## 2021-08-28 RX ADMIN — ACETAMINOPHEN 650 MG: 325 TABLET ORAL at 08:37

## 2021-08-28 RX ADMIN — OXYCODONE HYDROCHLORIDE 5 MG: 5 TABLET ORAL at 16:37

## 2021-08-28 RX ADMIN — DOCUSATE SODIUM 100 MG: 100 CAPSULE ORAL at 16:37

## 2021-08-28 RX ADMIN — LORATADINE 10 MG: 10 TABLET ORAL at 11:39

## 2021-08-28 RX ADMIN — SODIUM CHLORIDE, SODIUM LACTATE, POTASSIUM CHLORIDE, AND CALCIUM CHLORIDE 999 ML/HR: .6; .31; .03; .02 INJECTION, SOLUTION INTRAVENOUS at 00:01

## 2021-08-28 RX ADMIN — SIMETHICONE 80 MG: 80 TABLET, CHEWABLE ORAL at 20:45

## 2021-08-28 RX ADMIN — IBUPROFEN 600 MG: 600 TABLET ORAL at 20:44

## 2021-08-28 RX ADMIN — PANTOPRAZOLE SODIUM 40 MG: 40 INJECTION, POWDER, FOR SOLUTION INTRAVENOUS at 06:08

## 2021-08-28 RX ADMIN — NALBUPHINE HYDROCHLORIDE 5 MG: 10 INJECTION, SOLUTION INTRAMUSCULAR; INTRAVENOUS; SUBCUTANEOUS at 01:22

## 2021-08-28 RX ADMIN — DOCUSATE SODIUM 100 MG: 100 CAPSULE ORAL at 08:37

## 2021-08-28 RX ADMIN — OXYCODONE HYDROCHLORIDE AND ACETAMINOPHEN 1 TABLET: 5; 325 TABLET ORAL at 13:49

## 2021-08-28 RX ADMIN — ENOXAPARIN SODIUM 40 MG: 40 INJECTION SUBCUTANEOUS at 11:39

## 2021-08-28 RX ADMIN — OXYCODONE HYDROCHLORIDE 5 MG: 5 TABLET ORAL at 22:58

## 2021-08-28 RX ADMIN — ACETAMINOPHEN 650 MG: 325 TABLET ORAL at 21:28

## 2021-08-28 RX ADMIN — KETOROLAC TROMETHAMINE 30 MG: 30 INJECTION, SOLUTION INTRAMUSCULAR; INTRAVENOUS at 04:18

## 2021-08-28 RX ADMIN — ACETAMINOPHEN 650 MG: 325 TABLET ORAL at 16:37

## 2021-08-28 NOTE — PLAN OF CARE
Problem: PAIN - ADULT  Goal: Verbalizes/displays adequate comfort level or baseline comfort level  Description: Interventions:  - Encourage patient to monitor pain and request assistance  - Assess pain using appropriate pain scale  - Administer analgesics based on type and severity of pain and evaluate response  - Implement non-pharmacological measures as appropriate and evaluate response  - Consider cultural and social influences on pain and pain management  - Notify physician/advanced practitioner if interventions unsuccessful or patient reports new pain  Outcome: Progressing     Problem: INFECTION - ADULT  Goal: Absence or prevention of progression during hospitalization  Description: INTERVENTIONS:  - Assess and monitor for signs and symptoms of infection  - Monitor lab/diagnostic results  - Monitor all insertion sites, i e  indwelling lines, tubes, and drains  - Monitor endotracheal if appropriate and nasal secretions for changes in amount and color  - [place] appropriate cooling/warming therapies per order  - Administer medications as ordered  - Instruct and encourage patient and family to use good hand hygiene technique  - Identify and instruct in appropriate isolation precautions for identified infection/condition  Outcome: Progressing  Goal: Absence of fever/infection during neutropenic period  Description: INTERVENTIONS:  - Monitor WBC    Outcome: Progressing     Problem: SAFETY ADULT  Goal: Patient will remain free of falls  Description: INTERVENTIONS:  - Educate patient/family on patient safety including physical limitations  - Instruct patient to call for assistance with activity   - Keep Call bell within reach  - Keep bed low and locked with side rails adjusted as appropriate  - Keep care items and personal belongings within reach  - Initiate and maintain comfort rounds    Outcome: Progressing  Goal: Maintain or return to baseline ADL function  Description: INTERVENTIONS:  -  Assess patient's ability to carry out ADLs; assess patient's baseline for ADL function and identify physical deficits which impact ability to perform ADLs (bathing, care of mouth/teeth, toileting, grooming, dressing, etc )  - Assess/evaluate cause of self-care deficits   - Assess range of motion  - Assess patient's mobility; develop plan if impaired  - Assess patient's need for assistive devices and provide as appropriate  - Encourage maximum independence but intervene and supervise when necessary  - Involve family in performance of ADLs  - Assess for home care needs following discharge   - Consider OT consult to assist with ADL evaluation and planning for discharge  - Provide patient education as appropriate  Outcome: Progressing  Goal: Maintains/Returns to pre admission functional level  Description: INTERVENTIONS:  - Set and communicate daily mobility goal to care team and patient/family/caregiver  - Record patient progress and toleration of activity level   Outcome: Progressing     Problem: Knowledge Deficit  Goal: Patient/family/caregiver demonstrates understanding of disease process, treatment plan, medications, and discharge instructions  Description: Complete learning assessment and assess knowledge base    Interventions:  - Provide teaching at level of understanding  - Provide teaching via preferred learning methods  Outcome: Progressing     Problem: DISCHARGE PLANNING  Goal: Discharge to home or other facility with appropriate resources  Description: INTERVENTIONS:  - Identify barriers to discharge w/patient and caregiver  - Arrange for needed discharge resources and transportation as appropriate  - Identify discharge learning needs (meds, wound care, etc )  - Arrange for interpretive services to assist at discharge as needed  - Refer to Case Management Department for coordinating discharge planning if the patient needs post-hospital services based on physician/advanced practitioner order or complex needs related to functional status, cognitive ability, or social support system  Outcome: Progressing     Problem: POSTPARTUM  Goal: Experiences normal postpartum course  Description: INTERVENTIONS:  - Monitor maternal vital signs  - Assess uterine involution and lochia  Outcome: Progressing  Goal: Appropriate maternal -  bonding  Description: INTERVENTIONS:  - Identify family support  - Assess for appropriate maternal/infant bonding   -Encourage maternal/infant bonding opportunities  - Referral to  or  as needed  Outcome: Progressing  Goal: Establishment of infant feeding pattern  Description: INTERVENTIONS:  - Assess breast/bottle feeding  - Refer to lactation as needed  Outcome: Progressing  Goal: Incision(s), wounds(s) or drain site(s) healing without S/S of infection  Description: INTERVENTIONS  - Assess and document dressing, incision, wound bed, drain sites and surrounding tissue  - Provide patient and family education  Outcome: Progressing     Problem: ALTERATION IN THE BREAST  Goal: Optimize infant feeding at the breast  Description: INTERVENTIONS:  - Latch, breast and nipple assessment  - Assess prior breast feeding history  - Hand expression of breast milk  - For cracked, bleeding and or sore nipples reassess latch, treat damaged nipple  -Educate mother on feeding cues  -Positioning/latch techniques  Outcome: Progressing     Problem: INADEQUATE LATCH, SUCK OR SWALLOW  Goal: Demonstrate ability to latch and sustain latch, audible swallowing and satiety  Description: INTERVENTIONS:  - Assess oral anatomy, notify Physician/AP for abnormal findings  - Establish milk expression  - Maximize feeding opportunity (skin to skin, behavioral state)  - Position/latch techniques  - Discourage use of pacifier-artificial nipple  - Mechanical pumping  - Nipple Shield  - Supplemental formula feeding (Physician/AP order)  - Alternative feeding method  Outcome: Progressing

## 2021-08-28 NOTE — PROGRESS NOTES
Progress Note - OB/GYN   Laurel Mello 39 y o  female MRN: [id number]  Unit/Bed#: -01 Encounter: [id number]    Assessment:  Post partum Day #1 s/pRLVCS & BS, stable, baby in room with mom and dad    Plan:  1  Post partum   - Continue routine post partum care  - Encourage ambulation  - Encourage breastfeeding  - QBL: 658mL; Hgb 11 9 --> AM CBC pending  - UOP: 66cc/hr; Anticipate Green removal and voiding trial this morning    2  Pain:   - Tylenol, Motrin, Eloisa 5/10 PRN  - Extensive adhesive disease     3  A1GDM: Plan for 2hr GTT 6wk PP    4  DVT Ppx: Lovenox to start today pending CBC    Subjective/Objective   Chief Complaint:     Post delivery  Patient is doing well  Lochia WNL  Pain well controlled  Subjective: This morning, she is tired this morning and concerned about breastfeeding  Pain: yes, cramping, improved with meds (rotating Toradol and tylenol)  Tolerating PO: yes  Voiding: yes via Green  Flatus: no  BM: no  Ambulating: yes  Breastfeeding:  Yes - struggling with sleepy baby and latch with concern for tongue tie  Chest pain: no  Shortness of breath: no  Leg pain: no  Lochia: minimal    Objective:     Vitals: BP (!) 93/48 (BP Location: Right arm)   Pulse 81   Temp 98 1 °F (36 7 °C) (Oral)   Resp 18   Ht 5' 1" (1 549 m)   Wt 103 kg (228 lb)   LMP  (LMP Unknown)   SpO2 96%   Breastfeeding Yes   BMI 43 08 kg/m²       Intake/Output Summary (Last 24 hours) at 8/28/2021 0408  Last data filed at 8/28/2021 0115  Gross per 24 hour   Intake 2500 ml   Output 1533 ml   Net 967 ml       Lab Results   Component Value Date    WBC 8 47 08/27/2021    HGB 11 9 08/27/2021    HCT 35 7 08/27/2021    MCV 92 08/27/2021     08/27/2021       Physical Exam:   Physical Exam  Vitals reviewed  Constitutional:       Appearance: She is well-developed  Cardiovascular:      Rate and Rhythm: Normal rate and regular rhythm  Heart sounds: Normal heart sounds  No murmur heard  No friction rub   No gallop  Pulmonary:      Effort: Pulmonary effort is normal  No respiratory distress  Breath sounds: Normal breath sounds  No stridor  No wheezing  Abdominal:      General: There is no distension  Palpations: Abdomen is soft  Tenderness: There is no abdominal tenderness  There is no guarding  Comments: Incision was not assessed as patient was attempting to breastfeed at time of exam   Skin:     General: Skin is warm and dry     Psychiatric:         Behavior: Behavior normal          Aviva Doss MD  8/28/2021  4:08 AM

## 2021-08-28 NOTE — LACTATION NOTE
This note was copied from a baby's chart    CONSULT - LACTATION  Baby Boy (Miri Cao) Rosa 1 days male MRN: [id number]    [facility] NURSERY Room / Bed: (N)/(N) Encounter: [id number]    Maternal Information     MOTHER:  Lore Lee  Maternal Age: 39 y o    OB History: # 1 - Date: 03, Sex: None, Weight: None, GA: 15w0d, Delivery: None, Apgar1: None, Apgar5: None, Living: None, Birth Comments: None    # 2 - Date: , Sex: None, Weight: None, GA: 10w0d, Delivery: Spontaneous , Apgar1: None, Apgar5: None, Living: None, Birth Comments: None    # 3 - Date: , Sex: None, Weight: None, GA: 7w0d, Delivery: Spontaneous , Apgar1: None, Apgar5: None, Living: None, Birth Comments: None    # 4 - Date: 01/14/10, Sex: Male, Weight: 2977 g (6 lb 9 oz), GA: 39w0d, Delivery: Vaginal, Spontaneous, Apgar1: None, Apgar5: None, Living: Living, Birth Comments: Describes delivery as "Truamatic"    # 5 - Date: 2010, Sex: None, Weight: None, GA: 6w0d, Delivery: None, Apgar1: None, Apgar5: None, Living: None, Birth Comments: None    # 6 - Date: 11, Sex: Male, Weight: 2693 g (5 lb 15 oz), GA: 39w0d, Delivery: , Low Transverse, Apgar1: None, Apgar5: None, Living: Living, Birth Comments: Fetal intolerance/decels/General Anes    # 7 - Date: , Sex: None, Weight: None, GA: 8w0d, Delivery: None, Apgar1: None, Apgar5: None, Living: None, Birth Comments: None    # 8 - Date: 13, Sex: Male, Weight: 3402 g (7 lb 8 oz), GA: 39w0d, Delivery: , Low Transverse, Apgar1: None, Apgar5: None, Living: Living, Birth Comments: Uterine Rupture    # 9 - Date: 16, Sex: Male, Weight: 3180 g (7 lb 0 2 oz), GA: 36w3d, Delivery: , Low Transverse, Apgar1: 8, Apgar5: 9, Living: Living, Birth Comments: Prenatal:Received prenatal care: yesMaternal Blood Type O positive   Antibody negativeMaternal hepatitis B surface antigen status: negativeMaternal HIV status: unknownMaternal RPR not doneMaternal Rubella not doneMaternal Group B strep: negativeMaternal STDs: chlamydia (neg) and gonorrhea (neg)Hepatitis C Antibody negativeMaternal drug screen: not done    # 10 - Date: 21, Sex: Male, Weight: 2948 g (6 lb 8 oz), GA: 36w0d, Delivery: , Low Transverse, Apgar1: 8, Apgar5: 9, Living: Living, Birth Comments: None   Previouse breast reduction surgery? No    Lactation history:   Has patient previously breast fed: Yes   How long had patient previously breast fed: 1 st baby was tongue restricted  Released at 3weeks of age, bottle fed and breast milk 50/50  2nd child  for one year, 3rd child  one year   4th child LPI with tongue restriction released at day 3, breast and SNS for 8 months (infant with hernia complicating breastfeeding relationship)   Previous breast feeding complications:       Past Surgical History:   Procedure Laterality Date    APPENDECTOMY           SECTION      C/S x 3    LAPAROSCOPY AND HYSTEROSCOPY      Reproductive Medicine/Recurrent losses    GA LAP,APPENDECTOMY N/A 2019    Procedure: APPENDECTOMY LAPAROSCOPIC;  Surgeon: Montserrat Mao MD;  Location: [facility];  Service: General        Birth information:  YOB: 2022   Time of birth: 1:40 PM   Sex: male   Delivery type: , Low Transverse   Birth Weight: 2948 g (6 lb 8 oz)   Percent of Weight Change: -2%     Gestational Age: 44w0d   [unfilled]    Assessment     Breast and nipple assessment: normal assessment    [place] Assessment: frequent gagging and sleepy    Feeding assessment: latch difficulty (LPI, disorganized suck pattern)  LATCH:  Latch: Repeated attempts, hold nipple in mouth, stimulate to suck   Audible Swallowing: None   Type of Nipple: Everted (After stimulation)   Comfort (Breast/Nipple): Soft/non-tender   Hold (Positioning): Full assist, staff holds infant at breast   LATCH Score: 5          Feeding recommendations:  pump every 2-3 hours and supplement with expressed colostrum and donor milk via bottle and nipple Paced with chin support    Lore requested and obtained a 20 mm NS  C/O too much breast going in flange  Downsized flange to 21 mm  Poor latch, suck, swallow  Kaleb Wilson is very experienced in challenging breastfeeding relationships with older 4 children  1 st baby was tongue restricted  Released at 3weeks of age, bottle fed and breast milk 50/50  2nd child  for one year, 3rd child  one year  4th child LPI with tongue restriction released at day 3, breast and SNS for 8 months (infant with hernia complicating breastfeeding relationship)    Worked on positioning infant up at chest level and starting to feed infant with nose arriving at the nipple  Then, using areolar compression to achieve a deep latch that is comfortable and exchanges optimum amounts of milk  Feeding Plan:  1) introduce breast first for every feeding  2) to feed infant expressed breast milk after breast  3)  feed infant donor bridge milk (you may do step 2 & 3 with paced bottle feeding)  4)  to pump after every feeding at the breast     Information given and discussed on breastfeeding a late  infant  Discussed sleepiness, maintaining body temperature, the lack of stamina necessitating shorter feedings  Encouraged feeding every 2-3 hours around the clock followed by hand expressing/pumping  Met with mother  Provided mother with Ready, Set, Baby booklet  Discussed Skin to Skin contact an benefits to mom and baby  Talked about the delay of the first bath until baby has adjusted  Spoke about the benefits of rooming in  Feeding on cue and what that means for recognizing infant's hunger  Avoidance of pacifiers for the first month discussed  Talked about exclusive breastfeeding for the first 6 months  Positioning and latch reviewed as well as showing images of other feeding positions    Discussed the properties of a good latch in any position  Reviewed hand/manual expression  Discussed s/s that baby is getting enough milk and some s/s that breastfeeding dyad may need further help  Gave information on common concerns, what to expect the first few weeks after delivery, preparing for other caregivers, and how partners can help  Resources for support also provided  Encouraged parents to call for assistance, questions, and concerns about breastfeeding  Extension provided          Milli Valdez RN 8/28/2021 10:09 AM

## 2021-08-28 NOTE — PROGRESS NOTES
Lucian Gipson  [id number]  8/27/2021  10:12 PM    POST-OP CHECK     S:  Lucian Gipson doing well  Pain controlled  She is more sore after this C/S than her others but understands that this is due to extensive adhesions during her case  Denies nausea/vomiting  Denies chest pain, shortness of breath  No complaints at this time  O:  Vitals:    08/27/21 2014   BP: 101/57   Pulse: 71   Resp: 18   Temp: 98 5 °F (36 9 °C)   SpO2: 96%       Physical Exam  Vitals reviewed  Constitutional:       General: She is not in acute distress  Appearance: She is well-developed  She is not diaphoretic  Cardiovascular:      Rate and Rhythm: Normal rate  Pulmonary:      Effort: Pulmonary effort is normal    Abdominal:      Palpations: Abdomen is soft  Skin:     General: Skin is warm and dry  Neurological:      Mental Status: She is alert and oriented to person, place, and time  Psychiatric:         Behavior: Behavior normal        A:  Lcuian Gipson is s/p RLVCS & BS    P:  Routine postop check  IV/PO pain meds as needed  Regular diet as tolerated  Antiemetics for nausea/vomiting prn  Follow up am labs  Adequate UOP, continue to monitor  SCDs for DVT ppx, encourage ambulation as tolerated  Rupali Herrera MD  OB/GYN  8/27/2021  10:12 PM

## 2021-08-29 VITALS
RESPIRATION RATE: 16 BRPM | SYSTOLIC BLOOD PRESSURE: 101 MMHG | TEMPERATURE: 98.2 F | OXYGEN SATURATION: 97 % | BODY MASS INDEX: 43.05 KG/M2 | HEART RATE: 61 BPM | WEIGHT: 228 LBS | HEIGHT: 61 IN | DIASTOLIC BLOOD PRESSURE: 61 MMHG

## 2021-08-29 LAB
ABO GROUP BLD BPU: NORMAL
ABO GROUP BLD BPU: NORMAL
BPU ID: NORMAL
BPU ID: NORMAL
CROSSMATCH: NORMAL
CROSSMATCH: NORMAL
UNIT DISPENSE STATUS: NORMAL
UNIT DISPENSE STATUS: NORMAL
UNIT PRODUCT CODE: NORMAL
UNIT PRODUCT CODE: NORMAL
UNIT PRODUCT VOLUME: 350 ML
UNIT PRODUCT VOLUME: 350 ML
UNIT RH: NORMAL
UNIT RH: NORMAL

## 2021-08-29 PROCEDURE — 99024 POSTOP FOLLOW-UP VISIT: CPT | Performed by: OBSTETRICS & GYNECOLOGY

## 2021-08-29 RX ORDER — OXYCODONE HYDROCHLORIDE 5 MG/1
5 TABLET ORAL EVERY 4 HOURS PRN
Qty: 5 TABLET | Refills: 0 | Status: SHIPPED | OUTPATIENT
Start: 2021-08-29 | End: 2021-09-08

## 2021-08-29 RX ORDER — OXYCODONE HYDROCHLORIDE 5 MG/1
5 TABLET ORAL EVERY 4 HOURS PRN
Qty: 5 TABLET | Refills: 0 | Status: SHIPPED | OUTPATIENT
Start: 2021-08-29 | End: 2021-08-29

## 2021-08-29 RX ORDER — DOCUSATE SODIUM 100 MG/1
100 CAPSULE, LIQUID FILLED ORAL 2 TIMES DAILY
Refills: 0
Start: 2021-08-29 | End: 2021-09-30

## 2021-08-29 RX ORDER — IBUPROFEN 600 MG/1
600 TABLET ORAL EVERY 6 HOURS PRN
Qty: 30 TABLET | Refills: 0
Start: 2021-08-29

## 2021-08-29 RX ADMIN — LORATADINE 10 MG: 10 TABLET ORAL at 09:06

## 2021-08-29 RX ADMIN — OXYCODONE HYDROCHLORIDE 10 MG: 5 TABLET ORAL at 12:51

## 2021-08-29 RX ADMIN — IBUPROFEN 600 MG: 600 TABLET ORAL at 02:55

## 2021-08-29 RX ADMIN — ENOXAPARIN SODIUM 40 MG: 40 INJECTION SUBCUTANEOUS at 12:51

## 2021-08-29 RX ADMIN — DOCUSATE SODIUM 100 MG: 100 CAPSULE ORAL at 09:05

## 2021-08-29 RX ADMIN — ACETAMINOPHEN 650 MG: 325 TABLET ORAL at 01:53

## 2021-08-29 RX ADMIN — SIMETHICONE 80 MG: 80 TABLET, CHEWABLE ORAL at 09:11

## 2021-08-29 RX ADMIN — CALCIUM CARBONATE (ANTACID) CHEW TAB 500 MG 1000 MG: 500 CHEW TAB at 03:00

## 2021-08-29 RX ADMIN — SIMETHICONE 80 MG: 80 TABLET, CHEWABLE ORAL at 02:55

## 2021-08-29 RX ADMIN — IBUPROFEN 600 MG: 600 TABLET ORAL at 09:05

## 2021-08-29 NOTE — PLAN OF CARE
Problem: Potential for Falls  Goal: Patient will remain free of falls  Description: INTERVENTIONS:  - Educate patient/family on patient safety including physical limitations  - Instruct patient to call for assistance with activity   - Keep Call bell within reach  - Keep bed low and locked with side rails adjusted as appropriate  - Keep care items and personal belongings within reach  - Initiate and maintain comfort rounds    Outcome: Progressing     Problem: PAIN - ADULT  Goal: Verbalizes/displays adequate comfort level or baseline comfort level  Description: Interventions:  - Encourage patient to monitor pain and request assistance  - Assess pain using appropriate pain scale  - Administer analgesics based on type and severity of pain and evaluate response  - Implement non-pharmacological measures as appropriate and evaluate response  - Consider cultural and social influences on pain and pain management  - Notify physician/advanced practitioner if interventions unsuccessful or patient reports new pain  Outcome: Progressing     Problem: INFECTION - ADULT  Goal: Absence or prevention of progression during hospitalization  Description: INTERVENTIONS:  - Assess and monitor for signs and symptoms of infection  - Monitor lab/diagnostic results  - Monitor all insertion sites, i e  indwelling lines, tubes, and drains  - Monitor endotracheal if appropriate and nasal secretions for changes in amount and color  - [place] appropriate cooling/warming therapies per order  - Administer medications as ordered  - Instruct and encourage patient and family to use good hand hygiene technique  - Identify and instruct in appropriate isolation precautions for identified infection/condition  Outcome: Progressing     Problem: SAFETY ADULT  Goal: Patient will remain free of falls  Description: INTERVENTIONS:  - Educate patient/family on patient safety including physical limitations  - Instruct patient to call for assistance with activity - Keep Call bell within reach  - Keep bed low and locked with side rails adjusted as appropriate  - Keep care items and personal belongings within reach  - Initiate and maintain comfort rounds    Outcome: Progressing     Problem: Knowledge Deficit  Goal: Patient/family/caregiver demonstrates understanding of disease process, treatment plan, medications, and discharge instructions  Description: Complete learning assessment and assess knowledge base    Interventions:  - Provide teaching at level of understanding  - Provide teaching via preferred learning methods  Outcome: Progressing     Problem: DISCHARGE PLANNING  Goal: Discharge to home or other facility with appropriate resources  Description: INTERVENTIONS:  - Identify barriers to discharge w/patient and caregiver  - Arrange for needed discharge resources and transportation as appropriate  - Identify discharge learning needs (meds, wound care, etc )  - Arrange for interpretive services to assist at discharge as needed  - Refer to Case Management Department for coordinating discharge planning if the patient needs post-hospital services based on physician/advanced practitioner order or complex needs related to functional status, cognitive ability, or social support system  Outcome: Progressing     Problem: POSTPARTUM  Goal: Experiences normal postpartum course  Description: INTERVENTIONS:  - Monitor maternal vital signs  - Assess uterine involution and lochia  Outcome: Progressing  Goal: Appropriate maternal -  bonding  Description: INTERVENTIONS:  - Identify family support  - Assess for appropriate maternal/infant bonding   -Encourage maternal/infant bonding opportunities  - Referral to  or  as needed  Outcome: Progressing  Goal: Establishment of infant feeding pattern  Description: INTERVENTIONS:  - Assess breast/bottle feeding  - Refer to lactation as needed  Outcome: Progressing  Goal: Incision(s), wounds(s) or drain site(s) healing without S/S of infection  Description: INTERVENTIONS  - Assess and document dressing, incision, wound bed, drain sites and surrounding tissue  - Provide patient and family education  Outcome: Progressing     Problem: ALTERATION IN THE BREAST  Goal: Optimize infant feeding at the breast  Description: INTERVENTIONS:  - Latch, breast and nipple assessment  - Assess prior breast feeding history  - Hand expression of breast milk  - For cracked, bleeding and or sore nipples reassess latch, treat damaged nipple  -Educate mother on feeding cues  -Positioning/latch techniques  Outcome: Progressing     Problem: INADEQUATE LATCH, SUCK OR SWALLOW  Goal: Demonstrate ability to latch and sustain latch, audible swallowing and satiety  Description: INTERVENTIONS:  - Assess oral anatomy, notify Physician/AP for abnormal findings  - Establish milk expression  - Maximize feeding opportunity (skin to skin, behavioral state)  - Position/latch techniques  - Discourage use of pacifier-artificial nipple  - Mechanical pumping  - Nipple Shield  - Supplemental formula feeding (Physician/AP order)  - Alternative feeding method  Outcome: Progressing

## 2021-08-29 NOTE — PLAN OF CARE
Problem: Potential for Falls  Goal: Patient will remain free of falls  Description: INTERVENTIONS:  - Educate patient/family on patient safety including physical limitations  - Instruct patient to call for assistance with activity   - Consult OT/PT to assist with strengthening/mobility   - Keep Call bell within reach  - Keep bed low and locked with side rails adjusted as appropriate  - Keep care items and personal belongings within reach  - Initiate and maintain comfort rounds  - Obtain necessary fall risk management equipment  - Apply yellow socks and bracelet for high fall risk patients  - Consider moving patient to room near nurses station  Outcome: Progressing     Problem: PAIN - ADULT  Goal: Verbalizes/displays adequate comfort level or baseline comfort level  Description: Interventions:  - Encourage patient to monitor pain and request assistance  - Assess pain using appropriate pain scale  - Administer analgesics based on type and severity of pain and evaluate response  - Implement non-pharmacological measures as appropriate and evaluate response  - Consider cultural and social influences on pain and pain management  - Notify physician/advanced practitioner if interventions unsuccessful or patient reports new pain  Outcome: Progressing     Problem: INFECTION - ADULT  Goal: Absence or prevention of progression during hospitalization  Description: INTERVENTIONS:  - Assess and monitor for signs and symptoms of infection  - Monitor lab/diagnostic results  - Monitor all insertion sites, i e  indwelling lines, tubes, and drains  - Monitor endotracheal if appropriate and nasal secretions for changes in amount and color  - [place] appropriate cooling/warming therapies per order  - Administer medications as ordered  - Instruct and encourage patient and family to use good hand hygiene technique  - Identify and instruct in appropriate isolation precautions for identified infection/condition  Outcome: Progressing  Goal: Absence of fever/infection during neutropenic period  Description: INTERVENTIONS:  - Monitor WBC    Outcome: Progressing     Problem: SAFETY ADULT  Goal: Patient will remain free of falls  Description: INTERVENTIONS:  - Educate patient/family on patient safety including physical limitations  - Instruct patient to call for assistance with activity   - Consult OT/PT to assist with strengthening/mobility   - Keep Call bell within reach  - Keep bed low and locked with side rails adjusted as appropriate  - Keep care items and personal belongings within reach  - Initiate and maintain comfort rounds  - Obtain necessary fall risk management equipment  - Apply yellow socks and bracelet for high fall risk patients  - Consider moving patient to room near nurses station  Outcome: Progressing  Goal: Maintain or return to baseline ADL function  Description: INTERVENTIONS:  -  Assess patient's ability to carry out ADLs; assess patient's baseline for ADL function and identify physical deficits which impact ability to perform ADLs (bathing, care of mouth/teeth, toileting, grooming, dressing, etc )  - Assess/evaluate cause of self-care deficits   - Assess range of motion  - Assess patient's mobility; develop plan if impaired  - Assess patient's need for assistive devices and provide as appropriate  - Encourage maximum independence but intervene and supervise when necessary  - Involve family in performance of ADLs  - Assess for home care needs following discharge   - Consider OT consult to assist with ADL evaluation and planning for discharge  - Provide patient education as appropriate  Outcome: Progressing  Goal: Maintains/Returns to pre admission functional level  Description: INTERVENTIONS:  - Perform BMAT or MOVE assessment daily    - Set and communicate daily mobility goal to care team and patient/family/caregiver     - Collaborate with rehabilitation services on mobility goals if consulted  - Ambulate patient - Out of bed to chair   - Out of bed for meals   - Out of bed for toileting  - Record patient progress and toleration of activity level   Outcome: Progressing     Problem: Knowledge Deficit  Goal: Patient/family/caregiver demonstrates understanding of disease process, treatment plan, medications, and discharge instructions  Description: Complete learning assessment and assess knowledge base    Interventions:  - Provide teaching at level of understanding  - Provide teaching via preferred learning methods  Outcome: Progressing     Problem: DISCHARGE PLANNING  Goal: Discharge to home or other facility with appropriate resources  Description: INTERVENTIONS:  - Identify barriers to discharge w/patient and caregiver  - Arrange for needed discharge resources and transportation as appropriate  - Identify discharge learning needs (meds, wound care, etc )  - Arrange for interpretive services to assist at discharge as needed  - Refer to Case Management Department for coordinating discharge planning if the patient needs post-hospital services based on physician/advanced practitioner order or complex needs related to functional status, cognitive ability, or social support system  Outcome: Progressing     Problem: POSTPARTUM  Goal: Experiences normal postpartum course  Description: INTERVENTIONS:  - Monitor maternal vital signs  - Assess uterine involution and lochia  Outcome: Progressing  Goal: Appropriate maternal -  bonding  Description: INTERVENTIONS:  - Identify family support  - Assess for appropriate maternal/infant bonding   -Encourage maternal/infant bonding opportunities  - Referral to  or  as needed  Outcome: Progressing  Goal: Establishment of infant feeding pattern  Description: INTERVENTIONS:  - Assess breast/bottle feeding  - Refer to lactation as needed  Outcome: Progressing  Goal: Incision(s), wounds(s) or drain site(s) healing without S/S of infection  Description: INTERVENTIONS  - Assess and document dressing, incision, wound bed, drain sites and surrounding tissue  - Provide patient and family education  - Perform skin care/dressing changes   Outcome: Progressing     Problem: ALTERATION IN THE BREAST  Goal: Optimize infant feeding at the breast  Description: INTERVENTIONS:  - Latch, breast and nipple assessment  - Assess prior breast feeding history  - Hand expression of breast milk  - For cracked, bleeding and or sore nipples reassess latch, treat damaged nipple  -Educate mother on feeding cues  -Positioning/latch techniques  Outcome: Progressing     Problem: INADEQUATE LATCH, SUCK OR SWALLOW  Goal: Demonstrate ability to latch and sustain latch, audible swallowing and satiety  Description: INTERVENTIONS:  - Assess oral anatomy, notify Physician/AP for abnormal findings  - Establish milk expression  - Maximize feeding opportunity (skin to skin, behavioral state)  - Position/latch techniques  - Discourage use of pacifier-artificial nipple  - Mechanical pumping  - Nipple Shield  - Supplemental formula feeding (Physician/AP order)  - Alternative feeding method  Outcome: Progressing

## 2021-08-29 NOTE — PROGRESS NOTES
Progress Note - OB/GYN   Corinne Pasadena 39 y o  female MRN: [id number]  Unit/Bed#:  319-01 Encounter: [id number]    Assessment:  Post partum Day #2 s/p RLVCS and BS, stable, baby in room with patient  Blood pressures: /48-65    Plan:  #1  Postoperative state s/p RLVCS and BS  - Bicornuate uterus w/ h/o uterine rupture and extensive adhesions  -  mL, Hgb 11 9 --> 9 9  - Pain well controlled with oral analgesics  - Voiding spontaneously  - Tolerating PO fluids and solids  - DVT ppx Lovenox 40 mg daily (BMI 43)    #2  Contraception  - s/p bilateral salpingectomy    #3  Continue routine post partum care  - Encourage ambulation  - Encourage breastfeeding  - Anticipate discharge to home today at patient request       Subjective:   Post delivery  Patient is doing well  Lochia WNL  Pain well controlled  Pain: yes, cramping, improved with meds  Tolerating PO: yes  Voiding: yes  Flatus: yes  BM: no  Ambulating: yes  Breastfeeding:  yes  Chest pain: no  Shortness of breath: no  Leg pain: no  Lochia: WNL      Objective:     Vitals:   Vitals:    08/28/21 1559 08/28/21 2006 08/29/21 0002 08/29/21 0255   BP: 100/59 120/65 112/64 101/61   BP Location: Right arm Right arm  Right arm   Pulse: 73 74 64 61   Resp: 18 18 18 16   Temp: 98 7 °F (37 1 °C) 98 5 °F (36 9 °C) 98 3 °F (36 8 °C) 98 2 °F (36 8 °C)   TempSrc: Oral Oral Oral Oral   SpO2: 97% 97%     Weight:       Height:             Intake/Output Summary (Last 24 hours) at 8/29/2021 0703  Last data filed at 8/28/2021 1300  Gross per 24 hour   Intake --   Output 700 ml   Net -700 ml       Lab Results   Component Value Date    WBC 10 89 (H) 08/28/2021    HGB 9 9 (L) 08/28/2021    HCT 30 0 (L) 08/28/2021    MCV 95 08/28/2021     08/28/2021       Physical Exam:     Gen: NAD  CV: RRR  Lungs: CTA b/l  Abd: Soft, non-tender, softly distended with gas, no rebound or guarding  Uterine fundus firm and non-tender     Ext: Non tender    Incision: C/D/I    Patient seen by resident Dr Pamela Booker MD  08/29/21  10:49 AM

## 2021-08-29 NOTE — LACTATION NOTE
This note was copied from a baby's chart  Met with Lore to go over the discharge breastfeeding booklet including the feeding log  Emphasized 8 or more (12) feedings in a 24 hour period and what to expect for the number of diapers per day of life  Info given on breastfeeding and your lifestyle, storage and preparation of breast milk, how to keep you breast pump clean, the employed breastfeeding mother and paced bottle feeding handouts  Discussed s/s engorgement, blocked milk ducts, and mastitis  Discussed how to remedy at home and when to contact physician  [facility] states that baby is starting to latch on  She states that she has everything that she needs at home to supplement until her milk is in and baby is breastfeeding well  (formula/pump/bottles)  She has no additional questions or concerns at this time  Encouraged her to call with additional questions or for assistance as needed  Breastfeeding Resources for after discharge including access to the number for the [address] were supplied to [facility] as well

## 2021-08-30 LAB — RPR SER QL: NORMAL

## 2021-09-03 LAB — PLACENTA IN STORAGE: NORMAL

## 2021-09-03 NOTE — UTILIZATION REVIEW
Chapito Martins MD   Physician   OB/GYN   Discharge Summary      Signed   Date of Service:  2021 12:07 PM               Signed             CS Discharge Summary - Marcos Person 39 y o  female MRN: [id number]     Unit/Bed#: LD PACU-03 Encounter: [id number]     Admission Date: 2021      Discharge Date: 21        Admitting Diagnosis:   39 weeks gestation of pregnancy  Bicornuate uterus  History of uterine rupture  History of  section  Request for sterilization  Antiphospholipid syndrome  A1GDM  History of FGR  AMA  Obesity  Polyhydramnios  Sjogren's disease  Anxiety  Fibromyalgia     Discharge Diagnosis:   Delivery  S/p RLVCS  S/p Bilateral fimbriectomy and partial salpingectomy     Procedures:   repeat  section, low vertical incision  Bilateral fimbriectomy     Admitting Attending: Dr Moriah Presley  Delivery Attending: Dr Moriah Presley  Discharge Attending: Dr Riri Elizalde service: none  Consult attending: none     Hospital Course:      Marcos Person is a 39 y o  [id number] who was admitted at 36w0d for scheduled  section and tubal sterilization       She then underwent a repeat  section complicated by extensive adhesions  She delivered a viable male  on 21 at [phone]  APGARS were 8, 9 at 1 and 5 minutes, respectively   weighed 6lb 8oz   was then transferred to  nursery  Patient was transferred to recovery in stable condition       The patient's post partum course was unremarkable    Preoperative hemaglobin was 11 9, postoperative was 9 9  Her postoperative pain was well controlled with oral analgesics      On day of discharge, she was ambulating and able to reasonably perform all ADLs  She was voiding and had appropriate bowel function  Pain was well controlled  She was discharged home on post-operative day #2 without complications   Patient was instructed to follow up with her OB as an outpatient and was given appropriate warnings to call provider if she develops signs of infection or uncontrolled pain  She is breast and bottlefeeding   Mom's blood type is O positive       Complications:   None     Condition at discharge:   good      Provisions for Follow-Up Care:  See after visit summary for information related to follow-up care and any pertinent home health orders        Disposition:   Home     Planned Readmission:   No     Discharge Medications:   Prenatal vitamin daily for 6 months or the duration of nursing whichever is longer  Motrin 600 mg orally every 6 hours as needed for pain  Tylenol (over the counter) per bottle directions as needed for pain  Hydrocortisone cream 1% (over the counter) applied 1-2x daily to hemorrhoids as needed  Witch hazel pads for hemorrhoidal discomfort as needed  Roxicodone PRN     Discharge instructions :   -Do not place anything (no partner, tampons or douche) in your vagina for 6 weeks  -You may walk for exercise for the first 6 weeks then gradually return to your usual activities    -Please do not drive for 1 week if you have no stitches and for 2 weeks if you have stitches or underwent a  delivery     -You may take baths or shower per your preference    -Please look at your bust (breasts) in the mirror daily and call provider for redness or tenderness or increased warmth     - If you have had a  please look at your incision daily as well and call provider for increasing redness or steady drainage from the incision    -Please call your provider if temperature > 100 4*F or 38* C, worsening pain or a foul discharge      Lola Bourgeois MD  21  10:51 AM                     Revision History

## 2021-09-27 PROBLEM — E66.01 MATERNAL MORBID OBESITY IN THIRD TRIMESTER, ANTEPARTUM (HCC): Status: RESOLVED | Noted: 2021-07-01 | Resolved: 2021-09-27

## 2021-09-27 PROBLEM — Z34.93 PREGNANCY, OBSTETRICAL CARE, THIRD TRIMESTER: Status: RESOLVED | Noted: 2021-03-25 | Resolved: 2021-09-27

## 2021-09-27 PROBLEM — O99.213 MATERNAL MORBID OBESITY IN THIRD TRIMESTER, ANTEPARTUM (HCC): Status: RESOLVED | Noted: 2021-07-01 | Resolved: 2021-09-27

## 2021-09-27 PROBLEM — O09.293 HX OF INTRAUTERINE GROWTH RESTRICTION IN PRIOR PREGNANCY, CURRENTLY PREGNANT, THIRD TRIMESTER: Status: RESOLVED | Noted: 2021-07-01 | Resolved: 2021-09-27

## 2021-09-27 PROBLEM — O09.523 ELDERLY MULTIGRAVIDA, THIRD TRIMESTER: Status: RESOLVED | Noted: 2021-07-01 | Resolved: 2021-09-27

## 2021-09-27 PROBLEM — O40.3XX0 POLYHYDRAMNIOS AFFECTING PREGNANCY IN THIRD TRIMESTER: Status: RESOLVED | Noted: 2021-08-05 | Resolved: 2021-09-27

## 2021-09-30 ENCOUNTER — POSTPARTUM VISIT (OUTPATIENT)
Dept: OBGYN CLINIC | Age: 36
End: 2021-09-30

## 2021-09-30 VITALS
BODY MASS INDEX: 38.71 KG/M2 | SYSTOLIC BLOOD PRESSURE: 124 MMHG | DIASTOLIC BLOOD PRESSURE: 80 MMHG | HEIGHT: 61 IN | WEIGHT: 205 LBS

## 2021-09-30 DIAGNOSIS — D68.61 ANTIPHOSPHOLIPID SYNDROME DURING PREGNANCY (HCC): ICD-10-CM

## 2021-09-30 DIAGNOSIS — O24.410 DIET CONTROLLED GESTATIONAL DIABETES MELLITUS (GDM) IN THIRD TRIMESTER: ICD-10-CM

## 2021-09-30 DIAGNOSIS — R53.83 OTHER FATIGUE: ICD-10-CM

## 2021-09-30 DIAGNOSIS — Z98.891 STATUS POST REPEAT LOW TRANSVERSE CESAREAN SECTION: Primary | ICD-10-CM

## 2021-09-30 DIAGNOSIS — O99.119 ANTIPHOSPHOLIPID SYNDROME DURING PREGNANCY (HCC): ICD-10-CM

## 2021-09-30 PROCEDURE — 99024 POSTOP FOLLOW-UP VISIT: CPT | Performed by: STUDENT IN AN ORGANIZED HEALTH CARE EDUCATION/TRAINING PROGRAM

## 2021-09-30 NOTE — PROGRESS NOTES
Assessment and Plan:  Postpartum visit  Problem List Items Addressed This Visit        Endocrine    Diet controlled gestational diabetes mellitus (GDM) in third trimester    Relevant Orders    Glucose JOSELUIS 2HR 75GM Nonpreg       Hematopoietic and Hemostatic    Antiphospholipid syndrome during pregnancy ([facility] [address] )     Plan to discontinue 6 weeks out            Other    Status post repeat low transverse  section - Primary     And tubal, doing well         Other fatigue     TSH checked today         Relevant Orders    TSH, 3rd generation with Free T4 reflex        Pap smear due this year - will collect at annual exam  Postpartum depression: low risk  Contraception: s/p tubal    She will return in 3-4 months for her yearly exam, sooner PRN    --------------------------------------------------  Chief Complaint   Patient presents with   [facility]      c- section delivery        Subjective:   39 y o  female here for postpartum visit  She is s/p  (repeat) on 2021   Antepartum complicated by history ruptured uterus, bicornuate uterus, history FGR, GDMA1, AMA, history failed TOLAC, polyhydramnios, Sjogren's, antiphospholipid syndome  Surgery complicated by significant adhesive disease and need for vertical incision baby in left uterine horn and placenta in right  Postpartum course uncomplicated  Concerns today: none! Feeling great  Had a bit of a reaction to steri strips but this has calmed down     Gender: male, named Ramez Hals:   Weight: 6lb 8oz  Her lochia has almost resolved  She is Breastfeeding without problems  She denies postpartum blues/depression  Her EPDS is 3        Last Pap: Not on file, due      Objective:  /80 (BP Location: Right arm, Patient Position: Sitting, Cuff Size: Standard)   Ht 5' 1" (1 549 m)   Wt 93 kg (205 lb)   LMP  (LMP Unknown)   BMI 38 73 kg/m²   General:  NAD AAOx3  HEENT:  moist mucous membranes  Chest:  non-labored breathing  Breasts: no redness, erythema, or tenderness    Abdomen:  soft, non-distended, incision reapproximated with subcuticular suture clean, dry, intact no erythema/induration/discharge noted     Speculum exam: Normal appearing external genitalia, normal hair distribution  Urethra well-suspended, no clitoromegaly noted  Vagina pink, moist, well-rugated  Scant lochia noted  Cervix without lesion  Pelvic exam: No cervical motion tenderness  No adnexal masses or tenderness  Anteverted uterus, normal size      Extremity: warm and well-perfused, calves non-tender to palpation   Neuro: grossly normal  Skin: no rash noted

## 2022-01-13 ENCOUNTER — AMB VIDEO VISIT (OUTPATIENT)
Dept: OTHER | Facility: HOSPITAL | Age: 37
End: 2022-01-13

## 2022-01-13 DIAGNOSIS — J01.40 ACUTE PANSINUSITIS, RECURRENCE NOT SPECIFIED: Primary | ICD-10-CM

## 2022-01-13 PROCEDURE — ECARE PR SL URGENT CARE VIRTUAL VISIT: Performed by: NURSE PRACTITIONER

## 2022-01-13 RX ORDER — AMOXICILLIN 875 MG/1
875 TABLET, COATED ORAL 2 TIMES DAILY
Qty: 20 TABLET | Refills: 0 | Status: SHIPPED | OUTPATIENT
Start: 2022-01-13 | End: 2022-01-23

## 2022-01-13 RX ORDER — FLUTICASONE PROPIONATE 50 MCG
1 SPRAY, SUSPENSION (ML) NASAL DAILY
Qty: 16 G | Refills: 0 | Status: SHIPPED | OUTPATIENT
Start: 2022-01-13 | End: 2022-01-29

## 2022-01-13 NOTE — PROGRESS NOTES
Video Visit - Marisabel Sheldon 39 y o  female MRN: [id number]    REQUIRED DOCUMENTATION:         1  This service was provided via [employer]  2  Provider located at [address] [place] [id number]  3  [facility] provider: JEREMI Little  4  Identify all parties in [facility]:  Patient and 2 month old    11  After connecting through [employer], patient was identified by name and date of birth  Patient was then informed that this was a Telemedicine visit and that the exam was being conducted confidentially over secure lines  My office door was closed  No one else was in the room  Patient acknowledged consent and understanding of privacy and security of the Telemedicine visit  I informed the patient that I have reviewed their record in Epic and presented the opportunity for them to ask any questions regarding the visit today  The patient agreed to participate  This is a 14-year-old female here today for video visit with complaints nasal congestion, sinus pressure and postnasal drip  She states symptoms are about 10 days ago  She states she is having pressure over the maxillary and frontal region  She is bringing up green mucus  She is eating and drinking okay  She is to irritation from postnasal drip  She did not have COVID-19 vaccine  No fevers, body aches or chills  Physical Exam  Constitutional:       Appearance: Normal appearance  She is not ill-appearing  HENT:      Head:      Comments: Tenderness to palpate over the maxillary and frontal sinuses with self palpation  Pulmonary:      Effort: Pulmonary effort is normal  No respiratory distress  Comments: No cough or audible wheeze on video exam  Neurological:      General: No focal deficit present  Mental Status: She is alert and oriented to person, place, and time     Psychiatric:         Mood and Affect: Mood normal          Behavior: Behavior normal          Thought Content: Thought content normal          Judgment: Judgment normal        Diagnoses and all orders for this visit:    Acute pansinusitis, recurrence not specified  -     amoxicillin (AMOXIL) 875 mg tablet; Take 1 tablet (875 mg total) by mouth 2 (two) times a day for 10 days  -     fluticasone (FLONASE) 50 mcg/act nasal spray; 1 spray into each nostril daily for 16 days      Patient Instructions   Will start antibiotic for sinus infection  Take probiotic  Nasal saline flushes or helpful  Take Flonase  Avoid decongestants as this may decrease removed applied  As needed pain or fever  Follow up PCP if no improvement  Go to the ER with any worsening symptoms  Follow up with PCP if not improved, if symptoms are worse, go to the ER

## 2022-01-13 NOTE — CARE ANYWHERE EVISITS
Visit Summary for [facility]   ANKITA - Gender: Female - Date of Birth: [id number]  Date: [id number] - Duration: 7 minutes  Patient: [facility]   ANKITA  Provider: [id number]    Patient Contact Information  Address   [place] [address]  [phone]    Visit Topics  Cold [Added By: Self - 2022-01-13]    Triage Questions   What is your current physical address in the event of a medical emergency? Answer []  Are you allergic to any medications? Answer []  Are you now or could you be pregnant? Answer []  Do you have any immune system compromise or chronic lung   disease? Answer []  Do you have any vulnerable family members in the home (infant, pregnant, cancer, elderly)? Answer []     Conversation Transcripts  [0A][0A] [Notification] You are connected with Navin Swanson, Urgent Care Specialist [0A][Notification] Michael Cancer is located in [place]  [0A][Notification] Michael Cancer has shared health history  Geoff Valdes  [0A]    Diagnosis  Acute pansinusitis    Procedures  Value: [id number] Code: CPT-4 UNLISTED E&M SERVICE    Medications Prescribed    No prescriptions ordered    Electronically signed by: Navin Quesada(NPI [id number])

## 2022-01-13 NOTE — PATIENT INSTRUCTIONS
Will start antibiotic for sinus infection  Take probiotic  Nasal saline flushes or helpful  Take Flonase  Avoid decongestants as this may decrease removed applied  As needed pain or fever  Follow up PCP if no improvement  Go to the ER with any worsening symptoms

## 2022-07-18 ENCOUNTER — AMB VIDEO VISIT (OUTPATIENT)
Dept: OTHER | Facility: HOSPITAL | Age: 37
End: 2022-07-18
Payer: COMMERCIAL

## 2022-07-18 VITALS — TEMPERATURE: 97 F | RESPIRATION RATE: 16 BRPM

## 2022-07-18 DIAGNOSIS — J01.40 ACUTE PANSINUSITIS, RECURRENCE NOT SPECIFIED: Primary | ICD-10-CM

## 2022-07-18 PROCEDURE — 99212 OFFICE O/P EST SF 10 MIN: CPT | Performed by: NURSE PRACTITIONER

## 2022-07-18 RX ORDER — AMOXICILLIN AND CLAVULANATE POTASSIUM 875; 125 MG/1; MG/1
1 TABLET, FILM COATED ORAL EVERY 12 HOURS SCHEDULED
Qty: 20 TABLET | Refills: 0 | Status: SHIPPED | OUTPATIENT
Start: 2022-07-18 | End: 2022-07-28

## 2022-07-18 NOTE — CARE ANYWHERE EVISITS
Visit Summary for [facility]   ANKITA - Gender: Female - Date of Birth: [id number]  Date: [id number] - Duration: 6 minutes  Patient: [facility]   ANKITA  Provider: [facility]    Patient Contact Information  Address   [place] [address]  [phone]    Visit Topics  Cold [Added By: Self - 2022-07-18]    Triage Questions   What is your current physical address in the event of a medical emergency? Answer []  Are you allergic to any medications? Answer []  Are you now or could you be pregnant? Answer []  Do you have any immune system compromise or chronic lung   disease? Answer []  Do you have any vulnerable family members in the home (infant, pregnant, cancer, elderly)? Answer []     Conversation Transcripts  [0A][0A] [Notification] You are connected with Navin Vega, Urgent Care Specialist [0A][Notification] Apolonia Spence is located in [place]  [0A][Notification] Apolonia Spence has shared health history  Avis Lazaro  [0A]    Diagnosis  Acute pansinusitis    Procedures  Value: [id number] Code: CPT-4 UNLISTED E&M SERVICE    Medications Prescribed    No prescriptions ordered    Electronically signed by: Navin Peraza(NPI [id number])

## 2022-07-18 NOTE — PROGRESS NOTES
Video Visit - Rosalinda Segovia 44 y o  female MRN: [id number]    REQUIRED DOCUMENTATION:         1  This service was provided via [email or web address]  2  Provider located at [address] [place] [id number]  3  [facility] [address] [facility], CRNP  4  Identify all parties in room with patient during [facility] visit:  Patient   5  After connecting through [employer], patient was identified by name and date of birth  Patient was then informed that this was a Telemedicine visit and that the exam was being conducted confidentially over secure lines  My office door was closed  No one else was in the room  Patient acknowledged consent and understanding of privacy and security of the Telemedicine visit  I informed the patient that I have reviewed their record in Epic and presented the opportunity for them to ask any questions regarding the visit today  The patient agreed to participate  44year old female here today for video visit  She has had congestion and an sinus pressure for about 2 weeks  She is blowing discolored mucus out her nose  Increased sinus pressure today, thought she was getting better the last few days  She has slight cough from post nasal drip  Negative covid test   She is not vaccinated  Review of Systems   Constitutional: Positive for activity change and fatigue  Negative for chills  HENT: Positive for congestion, rhinorrhea, sinus pressure and sinus pain  Negative for sore throat  Tinnitus: augmentin  Respiratory: Positive for cough  Negative for chest tightness, shortness of breath and wheezing  Cardiovascular: Negative  Neurological: Negative  Hematological: Negative  Psychiatric/Behavioral: Negative  Vitals:    07/18/22 1640   Resp: 16   Temp: (!) 97 °F (36 1 °C)     Physical Exam  Constitutional:       General: She is not in acute distress  Appearance: Normal appearance  She is not ill-appearing or toxic-appearing     HENT:      Head: Normocephalic and atraumatic  Comments: Sinus pressure      Nose: Congestion present  Eyes:      Conjunctiva/sclera: Conjunctivae normal    Pulmonary:      Effort: Pulmonary effort is normal  No respiratory distress  Comments: No cough or she audible wheezing   Skin:     Comments: No rash on head or neck  Neurological:      General: No focal deficit present  Mental Status: She is alert and oriented to person, place, and time  Psychiatric:         Mood and Affect: Mood normal          Behavior: Behavior normal          Thought Content: Thought content normal          Judgment: Judgment normal        Diagnoses and all orders for this visit:    Acute pansinusitis, recurrence not specified  -     amoxicillin-clavulanate (AUGMENTIN) 875-125 mg per tablet; Take 1 tablet by mouth every 12 (twelve) hours for 10 days      Patient Instructions   Start antibiotic for sinus infection  Take probiotic  Nasal saline flushes  Follow up PCP if no improvement  Go to the ER with any worsening symptoms, chest pain, shortness breath or difficulty breathing  Follow up with PCP if not improved, if symptoms are worse, go to the ER

## 2022-07-21 NOTE — PATIENT INSTRUCTIONS
Start antibiotic for sinus infection  Take probiotic  Nasal saline flushes  Follow up PCP if no improvement  Go to the ER with any worsening symptoms, chest pain, shortness breath or difficulty breathing

## 2022-12-06 ENCOUNTER — TELEPHONE (OUTPATIENT)
Dept: OTHER | Facility: HOSPITAL | Age: 37
End: 2022-12-06

## 2022-12-06 ENCOUNTER — AMB VIDEO VISIT (OUTPATIENT)
Dept: OTHER | Facility: HOSPITAL | Age: 37
End: 2022-12-06

## 2022-12-06 DIAGNOSIS — T50.905A ADVERSE EFFECT OF DRUG, INITIAL ENCOUNTER: ICD-10-CM

## 2022-12-06 DIAGNOSIS — J01.00 ACUTE NON-RECURRENT MAXILLARY SINUSITIS: Primary | ICD-10-CM

## 2022-12-06 DIAGNOSIS — J01.40 ACUTE NON-RECURRENT PANSINUSITIS: Primary | ICD-10-CM

## 2022-12-06 PROBLEM — O34.03 BICORNUATE UTERUS AFFECTING PREGNANCY IN THIRD TRIMESTER, ANTEPARTUM: Status: RESOLVED | Noted: 2021-02-11 | Resolved: 2022-12-06

## 2022-12-06 PROBLEM — O24.410 DIET CONTROLLED GESTATIONAL DIABETES MELLITUS (GDM) IN THIRD TRIMESTER: Status: RESOLVED | Noted: 2021-03-25 | Resolved: 2022-12-06

## 2022-12-06 PROBLEM — Q51.3 BICORNUATE UTERUS AFFECTING PREGNANCY IN THIRD TRIMESTER, ANTEPARTUM: Status: RESOLVED | Noted: 2021-02-11 | Resolved: 2022-12-06

## 2022-12-06 PROBLEM — Z34.83 PRENATAL CARE, SUBSEQUENT PREGNANCY, THIRD TRIMESTER: Status: RESOLVED | Noted: 2021-08-12 | Resolved: 2022-12-06

## 2022-12-06 PROBLEM — Z30.2 REQUEST FOR STERILIZATION: Status: RESOLVED | Noted: 2021-02-11 | Resolved: 2022-12-06

## 2022-12-06 PROBLEM — Z98.891 STATUS POST REPEAT LOW TRANSVERSE CESAREAN SECTION: Status: RESOLVED | Noted: 2021-08-25 | Resolved: 2022-12-06

## 2022-12-06 RX ORDER — AMOXICILLIN 875 MG/1
875 TABLET, COATED ORAL 2 TIMES DAILY
Qty: 20 TABLET | Refills: 0 | Status: SHIPPED | OUTPATIENT
Start: 2022-12-06 | End: 2022-12-06

## 2022-12-06 RX ORDER — PREDNISONE 20 MG/1
TABLET ORAL
Qty: 11 TABLET | Refills: 0 | Status: SHIPPED | OUTPATIENT
Start: 2022-12-06 | End: 2022-12-12

## 2022-12-06 RX ORDER — AZITHROMYCIN 250 MG/1
500 TABLET, FILM COATED ORAL DAILY
Qty: 6 TABLET | Refills: 0 | Status: SHIPPED | OUTPATIENT
Start: 2022-12-06 | End: 2022-12-09

## 2022-12-06 NOTE — PATIENT INSTRUCTIONS
Sinusitis   WHAT YOU NEED TO KNOW:   Sinusitis is inflammation or infection of your sinuses  Sinusitis is most often caused by a virus  Acute sinusitis may last up to 12 weeks  Chronic sinusitis lasts longer than 12 weeks  Recurrent sinusitis means you have 4 or more infections in 1 year  DISCHARGE INSTRUCTIONS:   Return to the emergency department if:   You have trouble breathing or wheezing that is getting worse  You have a stiff neck, a fever, or a bad headache  You cannot open your eye  Your eyeball bulges out or you cannot move your eye  You are more sleepy than normal, or you notice changes in your ability to think, move, or talk  You have swelling of your forehead or scalp  Call your doctor if:   You have vision changes, such as double vision  Your eye and eyelid are red, swollen, and painful  Your symptoms do not improve or go away after 10 days  You have nausea and are vomiting  Your nose is bleeding  You have questions or concerns about your condition or care  Medicines: Your symptoms may go away on their own  Your healthcare provider may recommend watchful waiting for up to 10 days before starting antibiotics  You may need any of the following:  Acetaminophen  decreases pain and fever  It is available without a doctor's order  Ask how much to take and how often to take it  Follow directions  Read the labels of all other medicines you are using to see if they also contain acetaminophen, or ask your doctor or pharmacist  Acetaminophen can cause liver damage if not taken correctly  Do not use more than 4 grams (4,000 milligrams) total of acetaminophen in one day  NSAIDs , such as ibuprofen, help decrease swelling, pain, and fever  This medicine is available with or without a doctor's order  NSAIDs can cause stomach bleeding or kidney problems in certain people   If you take blood thinner medicine, always ask your healthcare provider if NSAIDs are safe for you  Always read the medicine label and follow directions  Nasal steroid sprays  may help decrease inflammation in your nose and sinuses  Decongestants  help reduce swelling and drain mucus in the nose and sinuses  They may help you breathe easier  Antihistamines  help dry mucus in the nose and relieve sneezing  Antibiotics  help treat or prevent a bacterial infection  Take your medicine as directed  Contact your healthcare provider if you think your medicine is not helping or if you have side effects  Tell him or her if you are allergic to any medicine  Keep a list of the medicines, vitamins, and herbs you take  Include the amounts, and when and why you take them  Bring the list or the pill bottles to follow-up visits  Carry your medicine list with you in case of an emergency  Self-care:   Rinse your sinuses as directed  Use a sinus rinse device to rinse your nasal passages with a saline (salt water) solution or distilled water  Do not use tap water  This will help thin the mucus in your nose and rinse away pollen and dirt  It will also help reduce swelling so you can breathe normally  Use a humidifier  to increase air moisture in your home  This may make it easier for you to breathe and help decrease your cough  Sleep with your head elevated  Place an extra pillow under your head before you go to sleep to help your sinuses drain  Drink liquids as directed  Ask your healthcare provider how much liquid to drink each day and which liquids are best for you  Liquids will thin the mucus in your nose and help it drain  Avoid drinks that contain alcohol or caffeine  Do not smoke, and avoid secondhand smoke  Nicotine and other chemicals in cigarettes and cigars can make your symptoms worse  Ask your healthcare provider for information if you currently smoke and need help to quit  E-cigarettes or smokeless tobacco still contain nicotine   Talk to your healthcare provider before you use these products  Prevent the spread of germs:   Wash your hands often with soap and water  Wash your hands after you use the bathroom, change a child's diaper, or sneeze  Wash your hands before you prepare or eat food  Stay away from people who are sick  Some germs spread easily and quickly through contact  Follow up with your doctor as directed: You may be referred to an ear, nose, and throat specialist  Write down your questions so you remember to ask them during your visits  © Copyright [employer] 2022 Information is for End User's use only and may not be sold, redistributed or otherwise used for commercial purposes  All illustrations and images included in CareNotes® are the copyrighted property of A D A M , Inc  or [facility]   The above information is an  only  It is not intended as medical advice for individual conditions or treatments  Talk to your doctor, nurse or pharmacist before following any medical regimen to see if it is safe and effective for you

## 2022-12-06 NOTE — PROGRESS NOTES
Video Visit - Victor Hugo Sepulveda 44 y o  female MRN: [id number]    REQUIRED DOCUMENTATION:         1  [facility]  2  Provider located at [address] [place] [id number] [phone]  3  [facility] provider: Tobias Wray PA-C   4  Identify all parties in room with patient during [facility] visit:  child(gifty)- permission granted  5  After connecting through [employer], patient was identified by name and date of birth  Patient was then informed that this was a Telemedicine visit and that the exam was being conducted confidentially over secure lines  My office door was closed  No one else was in the room  Patient acknowledged consent and understanding of privacy and security of the Telemedicine visit  I informed the patient that I have reviewed their record in Epic and presented the opportunity for them to ask any questions regarding the visit today  The patient agreed to participate  VITALS: Heart Rate: 78 BPM, Respiratory Rate: 12 RPM, Temperature 98 4° F, Blood Pressure Unavailable mmHg, Pulse Ox Unavailable % on RA    HPI  PT reports sinus infection x4 days- sinus pain and pressure, PND, L ear clogged muffled  Had a cold/flu 1 5-2 weeks ago followed immediately by this  OTC cold flu/meds, hot showers, nasal sprays, elderberry, tea  Did not do COVID testing  Physical Exam  Constitutional:       General: She is not in acute distress  Appearance: Normal appearance  She is not toxic-appearing  HENT:      Head: Normocephalic and atraumatic  Nose: No rhinorrhea  Comments: Sounds congested     Mouth/Throat:      Mouth: Mucous membranes are moist    Eyes:      Conjunctiva/sclera: Conjunctivae normal       Comments: glasses   Pulmonary:      Effort: Pulmonary effort is normal  No respiratory distress  Breath sounds: No wheezing (no gross audible wheeze through computer)  Musculoskeletal:      Cervical back: Normal range of motion  Skin:     Findings: No rash (on face or neck)  Neurological:      Mental Status: She is alert  Cranial Nerves: No dysarthria or facial asymmetry  Psychiatric:         Mood and Affect: Mood normal          Behavior: Behavior normal          Diagnoses and all orders for this visit:    Acute non-recurrent pansinusitis  -     amoxicillin (AMOXIL) 875 mg tablet; Take 1 tablet (875 mg total) by mouth 2 (two) times a day for 10 days      Patient Instructions     Sinusitis   WHAT YOU NEED TO KNOW:   Sinusitis is inflammation or infection of your sinuses  Sinusitis is most often caused by a virus  Acute sinusitis may last up to 12 weeks  Chronic sinusitis lasts longer than 12 weeks  Recurrent sinusitis means you have 4 or more infections in 1 year  DISCHARGE INSTRUCTIONS:   Return to the emergency department if:   · You have trouble breathing or wheezing that is getting worse  · You have a stiff neck, a fever, or a bad headache  · You cannot open your eye  · Your eyeball bulges out or you cannot move your eye  · You are more sleepy than normal, or you notice changes in your ability to think, move, or talk  · You have swelling of your forehead or scalp  Call your doctor if:   · You have vision changes, such as double vision  · Your eye and eyelid are red, swollen, and painful  · Your symptoms do not improve or go away after 10 days  · You have nausea and are vomiting  · Your nose is bleeding  · You have questions or concerns about your condition or care  Medicines: Your symptoms may go away on their own  Your healthcare provider may recommend watchful waiting for up to 10 days before starting antibiotics  You may need any of the following:  · Acetaminophen  decreases pain and fever  It is available without a doctor's order  Ask how much to take and how often to take it  Follow directions   Read the labels of all other medicines you are using to see if they also contain acetaminophen, or ask your doctor or pharmacist  Acetaminophen can cause liver damage if not taken correctly  Do not use more than 4 grams (4,000 milligrams) total of acetaminophen in one day  · NSAIDs , such as ibuprofen, help decrease swelling, pain, and fever  This medicine is available with or without a doctor's order  NSAIDs can cause stomach bleeding or kidney problems in certain people  If you take blood thinner medicine, always ask your healthcare provider if NSAIDs are safe for you  Always read the medicine label and follow directions  · Nasal steroid sprays  may help decrease inflammation in your nose and sinuses  · Decongestants  help reduce swelling and drain mucus in the nose and sinuses  They may help you breathe easier  · Antihistamines  help dry mucus in the nose and relieve sneezing  · Antibiotics  help treat or prevent a bacterial infection  · Take your medicine as directed  Contact your healthcare provider if you think your medicine is not helping or if you have side effects  Tell him or her if you are allergic to any medicine  Keep a list of the medicines, vitamins, and herbs you take  Include the amounts, and when and why you take them  Bring the list or the pill bottles to follow-up visits  Carry your medicine list with you in case of an emergency  Self-care:   · Rinse your sinuses as directed  Use a sinus rinse device to rinse your nasal passages with a saline (salt water) solution or distilled water  Do not use tap water  This will help thin the mucus in your nose and rinse away pollen and dirt  It will also help reduce swelling so you can breathe normally  · Use a humidifier  to increase air moisture in your home  This may make it easier for you to breathe and help decrease your cough  · Sleep with your head elevated  Place an extra pillow under your head before you go to sleep to help your sinuses drain  · Drink liquids as directed    Ask your healthcare provider how much liquid to drink each day and which liquids are best for you  Liquids will thin the mucus in your nose and help it drain  Avoid drinks that contain alcohol or caffeine  · Do not smoke, and avoid secondhand smoke  Nicotine and other chemicals in cigarettes and cigars can make your symptoms worse  Ask your healthcare provider for information if you currently smoke and need help to quit  E-cigarettes or smokeless tobacco still contain nicotine  Talk to your healthcare provider before you use these products  Prevent the spread of germs:   · Wash your hands often with soap and water  Wash your hands after you use the bathroom, change a child's diaper, or sneeze  Wash your hands before you prepare or eat food  · Stay away from people who are sick  Some germs spread easily and quickly through contact  Follow up with your doctor as directed: You may be referred to an ear, nose, and throat specialist  Write down your questions so you remember to ask them during your visits  © Copyright [employer] 2022 Information is for End User's use only and may not be sold, redistributed or otherwise used for commercial purposes  All illustrations and images included in CareNotes® are the copyrighted property of [employer]  or [address] [place], [place] [address]omayra lori   The above information is an  only  It is not intended as medical advice for individual conditions or treatments  Talk to your doctor, nurse or pharmacist before following any medical regimen to see if it is safe and effective for you

## 2022-12-06 NOTE — CARE ANYWHERE EVISITS
Visit Summary for [facility]   ANKITA - Gender: Female - Date of Birth: [id number]  Date: [id number] - Duration: 9 minutes  Patient: [facility]   ANKITA  Provider: Osbaldo Lee PA-C    Patient Contact Information  Address   KIKA Crocker; [address] [place] [address]  [phone]    Visit Topics  Earache [Added By: Self - 2022-12-06]  Cold [Added By: Self - 2022-12-06]    Triage Questions   What is your current physical address in the event of a medical emergency? Answer []  Are you allergic to any medications? Answer []  Are you now or could you be pregnant? Answer []  Do you have any immune system compromise or chronic lung   disease? Answer []  Do you have any vulnerable family members in the home (infant, pregnant, cancer, elderly)? Answer []     Conversation Transcripts  [0A][0A] [Notification] You are connected with Osbaldo Lee PA-C, Urgent Care Specialist [0A][Notification] Luis Milian is located in [place]  [0A][Notification] Luis Milian has shared health history  Abbie Osorio  [0A]    Diagnosis  Acute pansinusitis    Procedures  Value: 19854 Code: CPT-4 UNLISTED E&M SERVICE    Medications Prescribed    No prescriptions ordered    Electronically signed by: Justina Kaiser(NPI [id number])

## 2023-11-26 ENCOUNTER — AMB VIDEO VISIT (OUTPATIENT)
Dept: OTHER | Facility: HOSPITAL | Age: 38
End: 2023-11-26
Payer: COMMERCIAL

## 2023-11-26 VITALS — HEART RATE: 84 BPM | OXYGEN SATURATION: 99 % | TEMPERATURE: 98.1 F

## 2023-11-26 DIAGNOSIS — J01.40 ACUTE PANSINUSITIS, RECURRENCE NOT SPECIFIED: Primary | ICD-10-CM

## 2023-11-26 PROCEDURE — 99212 OFFICE O/P EST SF 10 MIN: CPT | Performed by: PHYSICIAN ASSISTANT

## 2023-11-26 RX ORDER — AZITHROMYCIN 250 MG/1
500 TABLET, FILM COATED ORAL EVERY 24 HOURS
Qty: 6 TABLET | Refills: 0 | Status: SHIPPED | OUTPATIENT
Start: 2023-11-26 | End: 2023-11-29

## 2023-11-26 RX ORDER — PREDNISONE 20 MG/1
TABLET ORAL
Qty: 11 TABLET | Refills: 0 | Status: SHIPPED | OUTPATIENT
Start: 2023-11-26 | End: 2023-12-02

## 2023-11-26 NOTE — PATIENT INSTRUCTIONS
Rhinosinusitis   WHAT YOU NEED TO KNOW:   Rhinosinusitis (RS) is inflammation or infection of your nasal passages and sinuses. RS is most often caused by a virus but may be caused by bacteria. Acute RS lasts up to 12 weeks. Chronic RS lasts more than 12 weeks. Recurrent RS means you have 4 or more infections in 1 year. DISCHARGE INSTRUCTIONS:   Return to the emergency department if:   You have trouble breathing, or wheezing that is getting worse. You have a stiff neck, a fever, or a bad headache. You cannot open your eye. Your eyeball bulges out, or you cannot move your eye. You are more sleepy than usual, or you notice changes in your ability to think, move, or talk. You have swelling of your forehead or scalp. Call your doctor if:   You have vision changes, such as double vision. Your eye and eyelid are red, swollen, and painful. Your symptoms do not improve after 10 days. You have nausea and are vomiting. Your nose is bleeding. You have questions or concerns about your condition or care. Medicines: Your symptoms may go away on their own. Your healthcare provider may recommend watchful waiting for up to 10 days before starting antibiotics. Antibiotics will not help if the infection is caused by a virus. Check with your provider before you take any over-the-counter medicines. You may need any of the following:  Acetaminophen  decreases pain and fever. It is available without a doctor's order. Ask how much to take and how often to take it. Follow directions. Read the labels of all other medicines you are using to see if they also contain acetaminophen, or ask your doctor or pharmacist. Acetaminophen can cause liver damage if not taken correctly. NSAIDs , such as ibuprofen, help decrease swelling, pain, and fever. This medicine is available with or without a doctor's order. NSAIDs can cause stomach bleeding or kidney problems in certain people.  If you take blood thinner medicine, always ask your healthcare provider if NSAIDs are safe for you. Always read the medicine label and follow directions. Nasal steroid sprays  help decrease inflammation in your nose and sinuses. Decongestants  help reduce swelling and drain mucus in the nose and sinuses. They may help you breathe easier. Do not use decongestants for more than 3 days. Antihistamines  help dry mucus in the nose and relieve sneezing. Antibiotics  help treat or prevent a bacterial infection. Self-care:   Rinse your sinuses as directed. Use a sinus rinse device to rinse your nasal passages with a saline (salt water) solution or distilled water. Do not  use tap water. A sinus rinse will help thin the mucus in your nose and rinse away pollen and dirt. It will also help lower swelling so you can breathe normally. Use a humidifier  to increase air moisture in your home. Moist air may make it easier for you to breathe and help decrease your cough. Sleep with your head raised. Place an extra pillow under your head before you go to sleep to help your sinuses drain. Drink liquids as directed. Ask your healthcare provider how much liquid to drink each day and which liquids are best for you. Liquids will thin the mucus in your nose and help it drain. Do not have drinks that contain alcohol or caffeine. Do not smoke, and avoid secondhand smoke. Nicotine and other chemicals in cigarettes and cigars can make your symptoms worse. Ask your healthcare provider for information if you currently smoke and need help to quit. E-cigarettes or smokeless tobacco still contain nicotine. Talk to your healthcare provider before you use these products. Prevent the spread of germs:   Wash your hands often with soap and water. Wash your hands after you use the bathroom, change a child's diaper, or sneeze. Wash your hands before you prepare or eat food. Stay away from people who are sick.   Some germs spread easily and quickly through contact. Follow up with your doctor as directed: You may be referred to an ear, nose, and throat specialist. Write down your questions so you remember to ask them during your visits. © Copyright [employer] 2023 Information is for End User's use only and may not be sold, redistributed or otherwise used for commercial purposes. The above information is an  only. It is not intended as medical advice for individual conditions or treatments. Talk to your doctor, nurse or pharmacist before following any medical regimen to see if it is safe and effective for you.

## 2023-11-26 NOTE — PROGRESS NOTES
Required Documentation:  Encounter provider Johanna Salazar PA-C    Provider located at [facility] [id number]    Identify all parties in room with patient during virtual visit:  significant other-permission granted and her parents  - permission granted    The patient was identified by name and date of birth. Philip Tejeda was informed that this is a telemedicine visit and that the visit is being conducted through the [address] [place] [address] Dr platform. She agrees to proceed. .  My office door was closed. No one else was in the room. She acknowledged consent and understanding of privacy and security of the video platform. The patient has agreed to participate and understands they can discontinue the visit at any time. Verification of patient location:    Patient is located at home in the following state in which I hold an active license [place]    Patient is aware this is a billable service. Reason for visit is No chief complaint on file. Subjective  HPI   Reports symptoms started 2 weeks ago, last Tuesday/Wednesday was feeling better. Friday night began having sinus pressure and pain, post nasal drip and cough. No wheezing or shortness of breath. No chest pain. Does have ear ache. No fevers or chills. She has had some mild relief from OTC decongestant products. No GI symptoms/    Past Medical History:   Diagnosis Date    Anemia     Antiphospholipid antibody syndrome (HCC) not on AC     Antiphospholipid syndrome during pregnancy ([address])     Antiphospholipid syndrome during pregnancy ([address])     Her chart suggest she has an anti phospholipid syndrome and that she is treated with Lovenox 40 units daily and baby aspirin 162 milligrams daily. Labs reviewed though she only showed a in anticardiolipin IgM of 16.   She states that another [facility] provider has also told her that this does not qualify as antiphospholipid syndrome but  Dayna Coleman her [facility] provider did feel this qualified and she feels t    Asthma     Bicornuate uterus affecting pregnancy in third trimester, antepartum 2021    Endometriosis     Fibromyalgia     Fibromyalgia, primary     Polycystic ovary syndrome     with insulin resistence    Request for sterilization 2021    Sjogren's disease ([address])     Status post repeat low transverse  section 2021       Past Surgical History:   Procedure Laterality Date    APPENDECTOMY      2019     SECTION      C/S x 3    LAPAROSCOPY AND HYSTEROSCOPY      Reproductive Medicine/Recurrent losses    VT  DELIVERY ONLY N/A 2021    Procedure:  SECTION () REPEAT;  Surgeon: Suresh Cummings MD;  [facility]: [facility];  Service: Obstetrics    VT LAPAROSCOPIC APPENDECTOMY N/A 2019    Procedure: Kira Avitia;  Surgeon: Yury Dawson MD;  Location: [facility] OR;  Service: General    VT LIG/TRNSXJ FALOPIAN TUBE  DEL/ABDML SURG  2021    Procedure: LIGATION/COAGULATION TUBAL;  Surgeon: Suresh Cummings MD;  Location: [facility];  Service: Obstetrics        Allergies   Allergen Reactions    Augmentin [Amoxicillin-Pot Clavulanate] Anaphylaxis    Valacyclovir Itching and Swelling     Treated for shingles and 24years of age       Review of Systems   Constitutional:  Negative for chills and fever. HENT:  Positive for congestion and sinus pressure. Negative for ear pain, hearing loss, nosebleeds, sinus pain, sore throat, trouble swallowing and voice change. Eyes:  Negative for photophobia, pain, discharge, redness and visual disturbance. Respiratory:  Negative for cough, shortness of breath and wheezing. Cardiovascular:  Negative for chest pain, palpitations and leg swelling. Gastrointestinal:  Negative for abdominal pain, nausea and vomiting. Skin:  Negative for rash and wound. Neurological:  Negative for dizziness, numbness and headaches.    Psychiatric/Behavioral: Negative for behavioral problems. Video Exam    Vitals:    11/26/23 1141   Pulse: 84   Temp: 98.1 °F (36.7 °C)   TempSrc: Skin   SpO2: 99%       Physical Exam  Constitutional:       General: She is not in acute distress. Appearance: Normal appearance. She is not toxic-appearing. HENT:      Head: Normocephalic and atraumatic. Nose: No rhinorrhea. Comments: Tenderness palpation over the maxillary sinuses on directed self exam  Eyes:      General: No scleral icterus. Right eye: No discharge. Left eye: No discharge. Conjunctiva/sclera: Conjunctivae normal.   Cardiovascular:      Rate and Rhythm: Normal rate. Pulmonary:      Effort: No respiratory distress. Musculoskeletal:      Cervical back: Neck supple. Skin:     Findings: No rash. Neurological:      Mental Status: She is alert and oriented to person, place, and time. Cranial Nerves: No dysarthria or facial asymmetry. Psychiatric:         Mood and Affect: Mood normal.         Behavior: Behavior normal.         Visit Time  Total Visit Duration: 14 minutes    Assessment/Plan:    Diagnoses and all orders for this visit:    Acute pansinusitis, recurrence not specified  -     azithromycin (ZITHROMAX) 250 mg tablet; Take 2 tablets (500 mg total) by mouth every 24 hours for 3 days  -     predniSONE 20 mg tablet; Take 2.5 tablets (50 mg total) by mouth daily for 2 days, THEN 2 tablets (40 mg total) daily for 2 days, THEN 1 tablet (20 mg total) daily for 2 days. Patient is a 77-year-old female with possibly recurrent infections of the frontal and maxillary sinuses. Her signs and symptoms today are consistent with a secondary bacterial infection following a viral upper respiratory infection.   Given her reaction to Augmentin and amoxicillin previously, we will not trial a cephalosporin as an outpatient as we cannot observe her reactions to that despite her having tolerated Ancef 2 years ago she did have the reaction to amoxicillin and Augmentin 1 year ago. Zithromax and prednisone were both prescribed in the same manner that they were for her most recent sinus infection for which symptoms were the same. If she does not have similar resolution of symptoms she should contact her PCP or potentially an ENT for further evaluation. She is always welcome for another virtual evaluation as well    Patient Instructions   Rhinosinusitis   WHAT YOU NEED TO KNOW:   Rhinosinusitis (RS) is inflammation or infection of your nasal passages and sinuses. RS is most often caused by a virus but may be caused by bacteria. Acute RS lasts up to 12 weeks. Chronic RS lasts more than 12 weeks. Recurrent RS means you have 4 or more infections in 1 year. DISCHARGE INSTRUCTIONS:   Return to the emergency department if:   You have trouble breathing, or wheezing that is getting worse. You have a stiff neck, a fever, or a bad headache. You cannot open your eye. Your eyeball bulges out, or you cannot move your eye. You are more sleepy than usual, or you notice changes in your ability to think, move, or talk. You have swelling of your forehead or scalp. Call your doctor if:   You have vision changes, such as double vision. Your eye and eyelid are red, swollen, and painful. Your symptoms do not improve after 10 days. You have nausea and are vomiting. Your nose is bleeding. You have questions or concerns about your condition or care. Medicines: Your symptoms may go away on their own. Your healthcare provider may recommend watchful waiting for up to 10 days before starting antibiotics. Antibiotics will not help if the infection is caused by a virus. Check with your provider before you take any over-the-counter medicines. You may need any of the following:  Acetaminophen  decreases pain and fever. It is available without a doctor's order. Ask how much to take and how often to take it. Follow directions.  Read the labels of all other medicines you are using to see if they also contain acetaminophen, or ask your doctor or pharmacist. Acetaminophen can cause liver damage if not taken correctly. NSAIDs , such as ibuprofen, help decrease swelling, pain, and fever. This medicine is available with or without a doctor's order. NSAIDs can cause stomach bleeding or kidney problems in certain people. If you take blood thinner medicine, always ask your healthcare provider if NSAIDs are safe for you. Always read the medicine label and follow directions. Nasal steroid sprays  help decrease inflammation in your nose and sinuses. Decongestants  help reduce swelling and drain mucus in the nose and sinuses. They may help you breathe easier. Do not use decongestants for more than 3 days. Antihistamines  help dry mucus in the nose and relieve sneezing. Antibiotics  help treat or prevent a bacterial infection. Self-care:   Rinse your sinuses as directed. Use a sinus rinse device to rinse your nasal passages with a saline (salt water) solution or distilled water. Do not  use tap water. A sinus rinse will help thin the mucus in your nose and rinse away pollen and dirt. It will also help lower swelling so you can breathe normally. Use a humidifier  to increase air moisture in your home. Moist air may make it easier for you to breathe and help decrease your cough. Sleep with your head raised. Place an extra pillow under your head before you go to sleep to help your sinuses drain. Drink liquids as directed. Ask your healthcare provider how much liquid to drink each day and which liquids are best for you. Liquids will thin the mucus in your nose and help it drain. Do not have drinks that contain alcohol or caffeine. Do not smoke, and avoid secondhand smoke. Nicotine and other chemicals in cigarettes and cigars can make your symptoms worse.  Ask your healthcare provider for information if you currently smoke and need help to quit. E-cigarettes or smokeless tobacco still contain nicotine. Talk to your healthcare provider before you use these products. Prevent the spread of germs:   Wash your hands often with soap and water. Wash your hands after you use the bathroom, change a child's diaper, or sneeze. Wash your hands before you prepare or eat food. Stay away from people who are sick. Some germs spread easily and quickly through contact. Follow up with your doctor as directed: You may be referred to an ear, nose, and throat specialist. Write down your questions so you remember to ask them during your visits. © Copyright [facility] [employer] 2023 Information is for End User's use only and may not be sold, redistributed or otherwise used for commercial purposes. The above information is an  only. It is not intended as medical advice for individual conditions or treatments. Talk to your doctor, nurse or pharmacist before following any medical regimen to see if it is safe and effective for you.

## 2023-11-26 NOTE — CARE ANYWHERE EVISITS
Visit Summary for [facility] . [facility] - Gender: Female - Date of Birth: [id number]  Date: [id number] - Duration: 14 minutes  Patient: [facility] . ANK[facility]  Provider: Teri Hurt PA-C    Patient Contact Information  Address    [address] [address]  [phone]    Visit Topics  Cold [Added By: Self - 2023-11-26]    Triage Questions   What is your current physical address in the event of a medical emergency? Answer []  Are you allergic to any medications? Answer []  Are you now or could you be pregnant? Answer []  Do you have any immune system compromise or chronic lung   disease? Answer []  Do you have any vulnerable family members in the home (infant, pregnant, cancer, elderly)? Answer []     Conversation Transcripts  [0A][0A] [Notification] You are connected with Teri Hurt PA-C, Urgent Care Specialist.[0A][Notification] Evita Bryan is located in [place]. [0A][Notification] Evita Bryan has shared health history. Gregory Steiner .[0A]    Diagnosis  Acute sinusitis, unspecified    Procedures  Value: [id number] Code: CPT-4 UNLISTED E&M SERVICE    Medications Prescribed    No prescriptions ordered    Electronically signed by: Pascual Murray(NPI [id number])

## 2025-04-28 DIAGNOSIS — Z88.9 ALLERGY HISTORY, DRUG: Primary | ICD-10-CM

## 2025-04-28 RX ORDER — EPINEPHRINE 0.3 MG/.3ML
0.3 INJECTION SUBCUTANEOUS ONCE
Qty: 0.6 ML | Refills: 0 | Status: SHIPPED | OUTPATIENT
Start: 2025-04-28 | End: 2025-04-28
